# Patient Record
Sex: FEMALE | Race: WHITE | Employment: OTHER | URBAN - METROPOLITAN AREA
[De-identification: names, ages, dates, MRNs, and addresses within clinical notes are randomized per-mention and may not be internally consistent; named-entity substitution may affect disease eponyms.]

---

## 2019-11-05 ENCOUNTER — OFFICE VISIT (OUTPATIENT)
Dept: VASCULAR SURGERY | Age: 84
End: 2019-11-05
Payer: MEDICARE

## 2019-11-05 VITALS
BODY MASS INDEX: 27.48 KG/M2 | HEIGHT: 60 IN | SYSTOLIC BLOOD PRESSURE: 119 MMHG | DIASTOLIC BLOOD PRESSURE: 62 MMHG | WEIGHT: 140 LBS | HEART RATE: 61 BPM

## 2019-11-05 DIAGNOSIS — R47.01 EXPRESSIVE APHASIA: ICD-10-CM

## 2019-11-05 DIAGNOSIS — I65.23 BILATERAL CAROTID ARTERY STENOSIS: Primary | ICD-10-CM

## 2019-11-05 PROCEDURE — 99203 OFFICE O/P NEW LOW 30 MIN: CPT | Performed by: SURGERY

## 2019-11-05 PROCEDURE — G8598 ASA/ANTIPLAT THER USED: HCPCS | Performed by: SURGERY

## 2019-11-05 PROCEDURE — G8417 CALC BMI ABV UP PARAM F/U: HCPCS | Performed by: SURGERY

## 2019-11-05 PROCEDURE — 1123F ACP DISCUSS/DSCN MKR DOCD: CPT | Performed by: SURGERY

## 2019-11-05 PROCEDURE — 4040F PNEUMOC VAC/ADMIN/RCVD: CPT | Performed by: SURGERY

## 2019-11-05 PROCEDURE — G8484 FLU IMMUNIZE NO ADMIN: HCPCS | Performed by: SURGERY

## 2019-11-05 PROCEDURE — 1090F PRES/ABSN URINE INCON ASSESS: CPT | Performed by: SURGERY

## 2019-11-05 PROCEDURE — 4004F PT TOBACCO SCREEN RCVD TLK: CPT | Performed by: SURGERY

## 2019-11-05 PROCEDURE — G8427 DOCREV CUR MEDS BY ELIG CLIN: HCPCS | Performed by: SURGERY

## 2019-11-05 RX ORDER — ESCITALOPRAM OXALATE 5 MG/1
TABLET ORAL
Refills: 3 | COMMUNITY
Start: 2019-10-05

## 2021-05-30 ENCOUNTER — APPOINTMENT (OUTPATIENT)
Dept: CT IMAGING | Age: 86
End: 2021-05-30
Payer: MEDICARE

## 2021-05-30 ENCOUNTER — HOSPITAL ENCOUNTER (EMERGENCY)
Age: 86
Discharge: HOME | End: 2021-05-31
Attending: EMERGENCY MEDICINE
Payer: MEDICARE

## 2021-05-30 DIAGNOSIS — K59.00 CONSTIPATION, UNSPECIFIED CONSTIPATION TYPE: Primary | ICD-10-CM

## 2021-05-30 LAB
ALBUMIN SERPL-MCNC: 3.7 G/DL (ref 3.5–5.2)
ALP BLD-CCNC: 130 U/L (ref 35–104)
ALT SERPL-CCNC: 11 U/L (ref 0–32)
ANION GAP SERPL CALCULATED.3IONS-SCNC: 8 MMOL/L (ref 7–16)
AST SERPL-CCNC: 20 U/L (ref 0–31)
BACTERIA: NORMAL /HPF
BASOPHILS ABSOLUTE: 0.03 E9/L (ref 0–0.2)
BASOPHILS RELATIVE PERCENT: 0.3 % (ref 0–2)
BILIRUB SERPL-MCNC: 0.5 MG/DL (ref 0–1.2)
BILIRUBIN URINE: NEGATIVE
BLOOD, URINE: NEGATIVE
BUN BLDV-MCNC: 17 MG/DL (ref 6–23)
CALCIUM SERPL-MCNC: 10.6 MG/DL (ref 8.6–10.2)
CHLORIDE BLD-SCNC: 101 MMOL/L (ref 98–107)
CLARITY: CLEAR
CO2: 25 MMOL/L (ref 22–29)
COLOR: ABNORMAL
CREAT SERPL-MCNC: 0.8 MG/DL (ref 0.5–1)
EOSINOPHILS ABSOLUTE: 0.13 E9/L (ref 0.05–0.5)
EOSINOPHILS RELATIVE PERCENT: 1.3 % (ref 0–6)
GFR AFRICAN AMERICAN: >60
GFR NON-AFRICAN AMERICAN: >60 ML/MIN/1.73
GLUCOSE BLD-MCNC: 102 MG/DL (ref 74–99)
GLUCOSE URINE: NEGATIVE MG/DL
HCT VFR BLD CALC: 46.2 % (ref 34–48)
HEMOGLOBIN: 15.3 G/DL (ref 11.5–15.5)
IMMATURE GRANULOCYTES #: 0.04 E9/L
IMMATURE GRANULOCYTES %: 0.4 % (ref 0–5)
KETONES, URINE: NEGATIVE MG/DL
LACTIC ACID: 1.8 MMOL/L (ref 0.5–2.2)
LEUKOCYTE ESTERASE, URINE: ABNORMAL
LIPASE: 58 U/L (ref 13–60)
LYMPHOCYTES ABSOLUTE: 1.96 E9/L (ref 1.5–4)
LYMPHOCYTES RELATIVE PERCENT: 18.9 % (ref 20–42)
MCH RBC QN AUTO: 27.9 PG (ref 26–35)
MCHC RBC AUTO-ENTMCNC: 33.1 % (ref 32–34.5)
MCV RBC AUTO: 84.2 FL (ref 80–99.9)
MONOCYTES ABSOLUTE: 0.94 E9/L (ref 0.1–0.95)
MONOCYTES RELATIVE PERCENT: 9.1 % (ref 2–12)
NEUTROPHILS ABSOLUTE: 7.27 E9/L (ref 1.8–7.3)
NEUTROPHILS RELATIVE PERCENT: 70 % (ref 43–80)
NITRITE, URINE: NEGATIVE
PDW BLD-RTO: 14 FL (ref 11.5–15)
PH UA: 5.5 (ref 5–9)
PLATELET # BLD: 234 E9/L (ref 130–450)
PMV BLD AUTO: 10.7 FL (ref 7–12)
POTASSIUM REFLEX MAGNESIUM: 4.5 MMOL/L (ref 3.5–5)
PROTEIN UA: NEGATIVE MG/DL
RBC # BLD: 5.49 E12/L (ref 3.5–5.5)
RBC UA: NORMAL /HPF (ref 0–2)
SODIUM BLD-SCNC: 134 MMOL/L (ref 132–146)
SPECIFIC GRAVITY UA: <=1.005 (ref 1–1.03)
TOTAL PROTEIN: 6.8 G/DL (ref 6.4–8.3)
UROBILINOGEN, URINE: 0.2 E.U./DL
WBC # BLD: 10.4 E9/L (ref 4.5–11.5)
WBC UA: NORMAL /HPF (ref 0–5)

## 2021-05-30 PROCEDURE — 80053 COMPREHEN METABOLIC PANEL: CPT

## 2021-05-30 PROCEDURE — 85025 COMPLETE CBC W/AUTO DIFF WBC: CPT

## 2021-05-30 PROCEDURE — 83605 ASSAY OF LACTIC ACID: CPT

## 2021-05-30 PROCEDURE — 83690 ASSAY OF LIPASE: CPT

## 2021-05-30 PROCEDURE — 93005 ELECTROCARDIOGRAM TRACING: CPT | Performed by: EMERGENCY MEDICINE

## 2021-05-30 PROCEDURE — 99282 EMERGENCY DEPT VISIT SF MDM: CPT

## 2021-05-30 PROCEDURE — 74176 CT ABD & PELVIS W/O CONTRAST: CPT

## 2021-05-30 PROCEDURE — 81001 URINALYSIS AUTO W/SCOPE: CPT

## 2021-05-31 VITALS
HEART RATE: 87 BPM | RESPIRATION RATE: 16 BRPM | OXYGEN SATURATION: 97 % | HEIGHT: 60 IN | SYSTOLIC BLOOD PRESSURE: 150 MMHG | TEMPERATURE: 97.1 F | BODY MASS INDEX: 27.34 KG/M2 | DIASTOLIC BLOOD PRESSURE: 71 MMHG

## 2021-05-31 LAB
EKG ATRIAL RATE: 82 BPM
EKG P AXIS: 48 DEGREES
EKG P-R INTERVAL: 138 MS
EKG Q-T INTERVAL: 374 MS
EKG QRS DURATION: 78 MS
EKG QTC CALCULATION (BAZETT): 436 MS
EKG R AXIS: -24 DEGREES
EKG T AXIS: 0 DEGREES
EKG VENTRICULAR RATE: 82 BPM

## 2021-05-31 PROCEDURE — 93010 ELECTROCARDIOGRAM REPORT: CPT | Performed by: INTERNAL MEDICINE

## 2021-05-31 NOTE — ED PROVIDER NOTES
HPI:  5/30/21, Time: 8:39 PM EDT         Ortiz Sharp is a 80 y.o. female presenting to the ED for constipation. Patient has a history of dementia, so I was unable to obtain a complete history. Her daughter is at bedside helping to provide history. She states that the patient has been constipated for an unknown period of time but possibly 2 weeks. States that she has tried multiple over-the-counter medications including Dulcolax, MiraLAX, and prune juice without relief. Also states that the patient was complaining of urinary discomfort. She was recently seen in urgent care but was told that her urinalysis did not show any infection. She also started complaining of some lower abdominal discomfort today. She has had no fevers or emesis. Patient was noted to be hypertensive in the ED. Patient's daughter states that she was agitated when the blood pressure was taken, and she does take antihypertensives at home as needed. Review of Systems:   Unable to obtain complete ROS due to dementia          --------------------------------------------- PAST HISTORY ---------------------------------------------  Past Medical History:  has a past medical history of Hypertension. Past Surgical History:  has a past surgical history that includes Appendectomy; Hysterectomy; bladder repair; joint replacement; and Cataract removal.    Social History:  reports that she has never smoked. She has never used smokeless tobacco. She reports that she does not drink alcohol and does not use drugs. Family History: family history is not on file. The patients home medications have been reviewed.     Allergies: Ciprofloxacin, Lisinopril, and Statins    -------------------------------------------------- RESULTS -------------------------------------------------  All laboratory and radiology results have been personally reviewed by myself   LABS:  Results for orders placed or performed during the hospital encounter of 05/30/21 Lactic Acid, Plasma   Result Value Ref Range    Lactic Acid 1.8 0.5 - 2.2 mmol/L   CBC Auto Differential   Result Value Ref Range    WBC 10.4 4.5 - 11.5 E9/L    RBC 5.49 3.50 - 5.50 E12/L    Hemoglobin 15.3 11.5 - 15.5 g/dL    Hematocrit 46.2 34.0 - 48.0 %    MCV 84.2 80.0 - 99.9 fL    MCH 27.9 26.0 - 35.0 pg    MCHC 33.1 32.0 - 34.5 %    RDW 14.0 11.5 - 15.0 fL    Platelets 902 026 - 685 E9/L    MPV 10.7 7.0 - 12.0 fL    Neutrophils % 70.0 43.0 - 80.0 %    Immature Granulocytes % 0.4 0.0 - 5.0 %    Lymphocytes % 18.9 (L) 20.0 - 42.0 %    Monocytes % 9.1 2.0 - 12.0 %    Eosinophils % 1.3 0.0 - 6.0 %    Basophils % 0.3 0.0 - 2.0 %    Neutrophils Absolute 7.27 1.80 - 7.30 E9/L    Immature Granulocytes # 0.04 E9/L    Lymphocytes Absolute 1.96 1.50 - 4.00 E9/L    Monocytes Absolute 0.94 0.10 - 0.95 E9/L    Eosinophils Absolute 0.13 0.05 - 0.50 E9/L    Basophils Absolute 0.03 0.00 - 0.20 E9/L   Comprehensive Metabolic Panel w/ Reflex to MG   Result Value Ref Range    Sodium 134 132 - 146 mmol/L    Potassium reflex Magnesium 4.5 3.5 - 5.0 mmol/L    Chloride 101 98 - 107 mmol/L    CO2 25 22 - 29 mmol/L    Anion Gap 8 7 - 16 mmol/L    Glucose 102 (H) 74 - 99 mg/dL    BUN 17 6 - 23 mg/dL    CREATININE 0.8 0.5 - 1.0 mg/dL    GFR Non-African American >60 >=60 mL/min/1.73    GFR African American >60     Calcium 10.6 (H) 8.6 - 10.2 mg/dL    Total Protein 6.8 6.4 - 8.3 g/dL    Albumin 3.7 3.5 - 5.2 g/dL    Total Bilirubin 0.5 0.0 - 1.2 mg/dL    Alkaline Phosphatase 130 (H) 35 - 104 U/L    ALT 11 0 - 32 U/L    AST 20 0 - 31 U/L   Lipase   Result Value Ref Range    Lipase 58 13 - 60 U/L   Urinalysis, reflex to microscopic   Result Value Ref Range    Color, UA Straw Straw/Yellow    Clarity, UA Clear Clear    Glucose, Ur Negative Negative mg/dL    Bilirubin Urine Negative Negative    Ketones, Urine Negative Negative mg/dL    Specific Gravity, UA <=1.005 1.005 - 1.030    Blood, Urine Negative Negative    pH, UA 5.5 5.0 - 9.0 Protein, UA Negative Negative mg/dL    Urobilinogen, Urine 0.2 <2.0 E.U./dL    Nitrite, Urine Negative Negative    Leukocyte Esterase, Urine TRACE (A) Negative   Microscopic Urinalysis   Result Value Ref Range    WBC, UA 0-1 0 - 5 /HPF    RBC, UA NONE 0 - 2 /HPF    Bacteria, UA NONE SEEN None Seen /HPF   EKG 12 Lead   Result Value Ref Range    Ventricular Rate 82 BPM    Atrial Rate 82 BPM    P-R Interval 138 ms    QRS Duration 78 ms    Q-T Interval 374 ms    QTc Calculation (Bazett) 436 ms    P Axis 48 degrees    R Axis -24 degrees    T Axis 0 degrees       RADIOLOGY:  Interpreted by Radiologist.  CT ABDOMEN PELVIS WO CONTRAST Additional Contrast? None   Final Result   No bowel obstruction or free intraperitoneal air. Cholelithiasis. Moderate-sized hiatal hernia. Sigmoid diverticulosis with no evidence of diverticulitis. Other chronic and incidental findings as noted. ------------------------- NURSING NOTES AND VITALS REVIEWED ---------------------------   The nursing notes within the ED encounter and vital signs as below have been reviewed. BP (!) 160/66   Pulse 88   Temp 97.6 °F (36.4 °C) (Temporal)   Resp 20   Ht 5' (1.524 m)   SpO2 97%   BMI 27.34 kg/m²   Oxygen Saturation Interpretation: Normal      ---------------------------------------------------PHYSICAL EXAM--------------------------------------      Constitutional/General: Alert, non toxic in NAD  Head: Normocephalic and atraumatic  Mouth: Oropharynx clear, handling secretions, no trismus  Neck: Supple, full ROM,   Pulmonary: Lungs clear to auscultation bilaterally, no wheezes, rales, or rhonchi. Not in respiratory distress  Cardiovascular:  Regular rate and rhythm, no murmurs, gallops, or rubs. 2+ distal pulses  Abdomen: Soft, non distended, mild lower abdominal tenderness, no rebound, no guarding. Extremities: Moves all extremities x 4.  Warm and well perfused  Skin: warm and dry without rash  Neurologic: Awake and alert, at her neurologic baseline, no focal motor or sensory deficits   Psych: Normal Affect. Behavior normal.      ------------------------------ ED COURSE/MEDICAL DECISION MAKING----------------------  Medications   magnesium citrate solution 296 mL (has no administration in time range)       Medical Decision Making/ED COURSE:   Patient is a 70-year-old female presenting with constipation and abdominal pain. Patient was nontoxic on examination. Labs and abdominal CT obtained. I reviewed and interpreted labs. Labs within normal limits. UA clean. No acute findings or evidence of bowel obstruction on abdominal CT. I did discuss incidental findings with the patient's daughter at bedside. Plan to give enema and discharged home with magnesium citrate. Supportive care measures and ED return precautions discussed. Patient remained hemodynamically stable throughout ED course. ED Course as of May 30 2356   Sun May 30, 2021   2037 EKG: This EKG is signed and interpreted by me. Rate: 82  Rhythm: Sinus  Interpretation: Sinus rhythm, normal FL interval, normal QRS, normal QT interval, no acute ST or T wave changes  Comparison: no previous EKG available        [JA]      ED Course User Index  [JA] Dhaval Lara MD       Counseling: The emergency provider has spoken with the patient and daughter and discussed todays results, in addition to providing specific details for the plan of care and counseling regarding the diagnosis and prognosis. Questions are answered at this time and they are agreeable with the plan.      --------------------------------- IMPRESSION AND DISPOSITION ---------------------------------    IMPRESSION  1. Constipation, unspecified constipation type        DISPOSITION  Disposition: Discharge to home  Patient condition is stable      NOTE: This report was transcribed using voice recognition software.  Every effort was made to ensure accuracy; however, inadvertent

## 2021-06-29 ENCOUNTER — HOSPITAL ENCOUNTER (INPATIENT)
Age: 86
LOS: 4 days | Discharge: ANOTHER HOSPITAL | DRG: 061 | End: 2021-07-03
Attending: EMERGENCY MEDICINE | Admitting: INTERNAL MEDICINE
Payer: MEDICARE

## 2021-06-29 ENCOUNTER — APPOINTMENT (OUTPATIENT)
Dept: GENERAL RADIOLOGY | Age: 86
DRG: 061 | End: 2021-06-29
Payer: MEDICARE

## 2021-06-29 ENCOUNTER — APPOINTMENT (OUTPATIENT)
Dept: CT IMAGING | Age: 86
DRG: 061 | End: 2021-06-29
Payer: MEDICARE

## 2021-06-29 DIAGNOSIS — I63.9 CEREBROVASCULAR ACCIDENT (CVA), UNSPECIFIED MECHANISM (HCC): Primary | ICD-10-CM

## 2021-06-29 DIAGNOSIS — I63.9 STROKE ABORTED BY ADMINISTRATION OF THROMBOLYTIC AGENT (HCC): ICD-10-CM

## 2021-06-29 LAB
ANION GAP SERPL CALCULATED.3IONS-SCNC: 10 MMOL/L (ref 7–16)
APTT: 30.1 SEC (ref 24.5–35.1)
BASOPHILS ABSOLUTE: 0.02 E9/L (ref 0–0.2)
BASOPHILS RELATIVE PERCENT: 0.2 % (ref 0–2)
BUN BLDV-MCNC: 17 MG/DL (ref 6–23)
CALCIUM SERPL-MCNC: 9.9 MG/DL (ref 8.6–10.2)
CHLORIDE BLD-SCNC: 102 MMOL/L (ref 98–107)
CO2: 23 MMOL/L (ref 22–29)
CREAT SERPL-MCNC: 0.8 MG/DL (ref 0.5–1)
D DIMER: 321 NG/ML DDU
EOSINOPHILS ABSOLUTE: 0.32 E9/L (ref 0.05–0.5)
EOSINOPHILS RELATIVE PERCENT: 3.5 % (ref 0–6)
GFR AFRICAN AMERICAN: >60
GFR NON-AFRICAN AMERICAN: >60 ML/MIN/1.73
GLUCOSE BLD-MCNC: 106 MG/DL (ref 74–99)
HCT VFR BLD CALC: 52 % (ref 34–48)
HEMOGLOBIN: 16.8 G/DL (ref 11.5–15.5)
IMMATURE GRANULOCYTES #: 0.04 E9/L
IMMATURE GRANULOCYTES %: 0.4 % (ref 0–5)
INR BLD: 1.1
LYMPHOCYTES ABSOLUTE: 3.02 E9/L (ref 1.5–4)
LYMPHOCYTES RELATIVE PERCENT: 32.6 % (ref 20–42)
MAGNESIUM: 2.4 MG/DL (ref 1.6–2.6)
MCH RBC QN AUTO: 27.1 PG (ref 26–35)
MCHC RBC AUTO-ENTMCNC: 32.3 % (ref 32–34.5)
MCV RBC AUTO: 84 FL (ref 80–99.9)
MONOCYTES ABSOLUTE: 0.98 E9/L (ref 0.1–0.95)
MONOCYTES RELATIVE PERCENT: 10.6 % (ref 2–12)
NEUTROPHILS ABSOLUTE: 4.88 E9/L (ref 1.8–7.3)
NEUTROPHILS RELATIVE PERCENT: 52.7 % (ref 43–80)
PDW BLD-RTO: 14.2 FL (ref 11.5–15)
PLATELET # BLD: 315 E9/L (ref 130–450)
PMV BLD AUTO: 10.9 FL (ref 7–12)
POTASSIUM REFLEX MAGNESIUM: 3.3 MMOL/L (ref 3.5–5)
PROTHROMBIN TIME: 11.8 SEC (ref 9.3–12.4)
RBC # BLD: 6.19 E12/L (ref 3.5–5.5)
SARS-COV-2, NAAT: NOT DETECTED
SODIUM BLD-SCNC: 135 MMOL/L (ref 132–146)
TROPONIN, HIGH SENSITIVITY: 26 NG/L (ref 0–9)
WBC # BLD: 9.3 E9/L (ref 4.5–11.5)

## 2021-06-29 PROCEDURE — 2580000003 HC RX 258

## 2021-06-29 PROCEDURE — 84484 ASSAY OF TROPONIN QUANT: CPT

## 2021-06-29 PROCEDURE — 6360000002 HC RX W HCPCS

## 2021-06-29 PROCEDURE — 70498 CT ANGIOGRAPHY NECK: CPT | Performed by: RADIOLOGY

## 2021-06-29 PROCEDURE — 71045 X-RAY EXAM CHEST 1 VIEW: CPT

## 2021-06-29 PROCEDURE — 85730 THROMBOPLASTIN TIME PARTIAL: CPT

## 2021-06-29 PROCEDURE — 2580000003 HC RX 258: Performed by: INTERNAL MEDICINE

## 2021-06-29 PROCEDURE — 0042T CT BRAIN PERFUSION: CPT | Performed by: RADIOLOGY

## 2021-06-29 PROCEDURE — 99282 EMERGENCY DEPT VISIT SF MDM: CPT

## 2021-06-29 PROCEDURE — 99291 CRITICAL CARE FIRST HOUR: CPT | Performed by: PSYCHIATRY & NEUROLOGY

## 2021-06-29 PROCEDURE — 2000000000 HC ICU R&B

## 2021-06-29 PROCEDURE — 83735 ASSAY OF MAGNESIUM: CPT

## 2021-06-29 PROCEDURE — 3E03317 INTRODUCTION OF OTHER THROMBOLYTIC INTO PERIPHERAL VEIN, PERCUTANEOUS APPROACH: ICD-10-PCS | Performed by: INTERNAL MEDICINE

## 2021-06-29 PROCEDURE — 85025 COMPLETE CBC W/AUTO DIFF WBC: CPT

## 2021-06-29 PROCEDURE — 4A03X5D MEASUREMENT OF ARTERIAL FLOW, INTRACRANIAL, EXTERNAL APPROACH: ICD-10-PCS | Performed by: RADIOLOGY

## 2021-06-29 PROCEDURE — 80048 BASIC METABOLIC PNL TOTAL CA: CPT

## 2021-06-29 PROCEDURE — 2500000003 HC RX 250 WO HCPCS

## 2021-06-29 PROCEDURE — 2500000003 HC RX 250 WO HCPCS: Performed by: INTERNAL MEDICINE

## 2021-06-29 PROCEDURE — 70498 CT ANGIOGRAPHY NECK: CPT

## 2021-06-29 PROCEDURE — 6360000004 HC RX CONTRAST MEDICATION: Performed by: RADIOLOGY

## 2021-06-29 PROCEDURE — 85378 FIBRIN DEGRADE SEMIQUANT: CPT

## 2021-06-29 PROCEDURE — 70496 CT ANGIOGRAPHY HEAD: CPT | Performed by: RADIOLOGY

## 2021-06-29 PROCEDURE — 36415 COLL VENOUS BLD VENIPUNCTURE: CPT

## 2021-06-29 PROCEDURE — 70450 CT HEAD/BRAIN W/O DYE: CPT

## 2021-06-29 PROCEDURE — 87635 SARS-COV-2 COVID-19 AMP PRB: CPT

## 2021-06-29 PROCEDURE — 70496 CT ANGIOGRAPHY HEAD: CPT

## 2021-06-29 PROCEDURE — 0042T CT BRAIN PERFUSION: CPT

## 2021-06-29 PROCEDURE — 6360000002 HC RX W HCPCS: Performed by: EMERGENCY MEDICINE

## 2021-06-29 PROCEDURE — 85610 PROTHROMBIN TIME: CPT

## 2021-06-29 RX ORDER — SODIUM CHLORIDE 9 MG/ML
25 INJECTION, SOLUTION INTRAVENOUS PRN
Status: DISCONTINUED | OUTPATIENT
Start: 2021-06-29 | End: 2021-07-03 | Stop reason: HOSPADM

## 2021-06-29 RX ORDER — SODIUM CHLORIDE 0.9 % (FLUSH) 0.9 %
5-40 SYRINGE (ML) INJECTION EVERY 12 HOURS SCHEDULED
Status: DISCONTINUED | OUTPATIENT
Start: 2021-06-29 | End: 2021-07-03 | Stop reason: HOSPADM

## 2021-06-29 RX ORDER — KETAMINE HYDROCHLORIDE 100 MG/ML
INJECTION, SOLUTION INTRAMUSCULAR; INTRAVENOUS
Status: DISPENSED
Start: 2021-06-29 | End: 2021-06-30

## 2021-06-29 RX ORDER — CLOPIDOGREL BISULFATE 75 MG/1
75 TABLET ORAL EVERY OTHER DAY
Status: ON HOLD | COMMUNITY
End: 2021-07-03 | Stop reason: HOSPADM

## 2021-06-29 RX ORDER — SODIUM CHLORIDE 0.9 % (FLUSH) 0.9 %
5-40 SYRINGE (ML) INJECTION PRN
Status: DISCONTINUED | OUTPATIENT
Start: 2021-06-29 | End: 2021-06-29 | Stop reason: SDUPTHER

## 2021-06-29 RX ORDER — ASPIRIN 300 MG/1
300 SUPPOSITORY RECTAL DAILY
Status: DISCONTINUED | OUTPATIENT
Start: 2021-06-30 | End: 2021-06-30

## 2021-06-29 RX ORDER — 0.9 % SODIUM CHLORIDE 0.9 %
1000 INTRAVENOUS SOLUTION INTRAVENOUS ONCE
Status: DISCONTINUED | OUTPATIENT
Start: 2021-06-29 | End: 2021-07-03 | Stop reason: HOSPADM

## 2021-06-29 RX ORDER — SODIUM CHLORIDE 0.9 % (FLUSH) 0.9 %
5-40 SYRINGE (ML) INJECTION PRN
Status: DISCONTINUED | OUTPATIENT
Start: 2021-06-29 | End: 2021-07-03 | Stop reason: HOSPADM

## 2021-06-29 RX ORDER — DEXTROSE MONOHYDRATE 25 G/50ML
12.5 INJECTION, SOLUTION INTRAVENOUS
Status: ACTIVE | OUTPATIENT
Start: 2021-06-29 | End: 2021-06-29

## 2021-06-29 RX ORDER — POLYETHYLENE GLYCOL 3350 17 G/17G
17 POWDER, FOR SOLUTION ORAL DAILY PRN
Status: DISCONTINUED | OUTPATIENT
Start: 2021-06-29 | End: 2021-07-03 | Stop reason: HOSPADM

## 2021-06-29 RX ORDER — SODIUM CHLORIDE 0.9 % (FLUSH) 0.9 %
5-40 SYRINGE (ML) INJECTION EVERY 12 HOURS SCHEDULED
Status: DISCONTINUED | OUTPATIENT
Start: 2021-06-29 | End: 2021-06-29 | Stop reason: SDUPTHER

## 2021-06-29 RX ORDER — ONDANSETRON 2 MG/ML
4 INJECTION INTRAMUSCULAR; INTRAVENOUS EVERY 6 HOURS PRN
Status: DISCONTINUED | OUTPATIENT
Start: 2021-06-29 | End: 2021-07-03 | Stop reason: HOSPADM

## 2021-06-29 RX ORDER — SODIUM CHLORIDE 9 MG/ML
25 INJECTION, SOLUTION INTRAVENOUS PRN
Status: DISCONTINUED | OUTPATIENT
Start: 2021-06-29 | End: 2021-06-29 | Stop reason: SDUPTHER

## 2021-06-29 RX ORDER — AMLODIPINE BESYLATE 5 MG/1
5 TABLET ORAL DAILY
Status: DISCONTINUED | OUTPATIENT
Start: 2021-06-30 | End: 2021-07-03 | Stop reason: HOSPADM

## 2021-06-29 RX ORDER — 0.9 % SODIUM CHLORIDE 0.9 %
50 INTRAVENOUS SOLUTION INTRAVENOUS ONCE
Status: DISCONTINUED | OUTPATIENT
Start: 2021-06-29 | End: 2021-07-03 | Stop reason: HOSPADM

## 2021-06-29 RX ORDER — ESCITALOPRAM OXALATE 10 MG/1
5 TABLET ORAL DAILY
Status: DISCONTINUED | OUTPATIENT
Start: 2021-06-30 | End: 2021-06-30

## 2021-06-29 RX ORDER — LORAZEPAM 2 MG/ML
1 INJECTION INTRAMUSCULAR ONCE
Status: COMPLETED | OUTPATIENT
Start: 2021-06-29 | End: 2021-06-29

## 2021-06-29 RX ORDER — KETAMINE HYDROCHLORIDE 10 MG/ML
INJECTION, SOLUTION INTRAMUSCULAR; INTRAVENOUS
Status: COMPLETED
Start: 2021-06-29 | End: 2021-06-29

## 2021-06-29 RX ORDER — ASPIRIN 81 MG/1
81 TABLET ORAL DAILY
Status: DISCONTINUED | OUTPATIENT
Start: 2021-06-30 | End: 2021-06-30

## 2021-06-29 RX ORDER — SODIUM CHLORIDE 0.9 % (FLUSH) 0.9 %
10 SYRINGE (ML) INJECTION ONCE
Status: DISCONTINUED | OUTPATIENT
Start: 2021-06-29 | End: 2021-07-03 | Stop reason: HOSPADM

## 2021-06-29 RX ORDER — ONDANSETRON 4 MG/1
4 TABLET, ORALLY DISINTEGRATING ORAL EVERY 8 HOURS PRN
Status: DISCONTINUED | OUTPATIENT
Start: 2021-06-29 | End: 2021-07-03 | Stop reason: HOSPADM

## 2021-06-29 RX ORDER — KETAMINE HYDROCHLORIDE 10 MG/ML
INJECTION, SOLUTION INTRAMUSCULAR; INTRAVENOUS
Status: DISPENSED
Start: 2021-06-29 | End: 2021-06-30

## 2021-06-29 RX ADMIN — DEXMEDETOMIDINE HYDROCHLORIDE 0.2 MCG/KG/HR: 100 INJECTION, SOLUTION INTRAVENOUS at 22:47

## 2021-06-29 RX ADMIN — LORAZEPAM 1 MG: 2 INJECTION INTRAMUSCULAR; INTRAVENOUS at 20:21

## 2021-06-29 RX ADMIN — IOPAMIDOL 100 ML: 755 INJECTION, SOLUTION INTRAVENOUS at 18:32

## 2021-06-29 RX ADMIN — KETAMINE HYDROCHLORIDE 20 MG: 10 INJECTION, SOLUTION INTRAMUSCULAR; INTRAVENOUS at 20:37

## 2021-06-29 RX ADMIN — SODIUM CHLORIDE, PRESERVATIVE FREE 10 ML: 5 INJECTION INTRAVENOUS at 22:57

## 2021-06-29 NOTE — ED NOTES
Time Neurologist page:180    Time Stroke Alert called:  :1692    Time Neurologist called back:1822    X-Ray/CT notified:Michael Christina  06/29/21 1825

## 2021-06-29 NOTE — LETTER
City: [address] [place] [address]         Marital Status:    Employer: RETIRED         Religious: Christianity   Primary Care Provider: Myra Torres MD         Primary Phone: [phone]   EMERGENCY CONTACT   Contact Name Legal Guardian? Relationship to Patient Home Phone Work Phone   1. RayTeresa  2. RayFelipe No  No Child  Other [phone]              GUARANTOR            Guarantor: Kalpesh Ambrosemagdins     : 1926   Address: [address] [place] [address] Sex: Female   [place] [address] [facility]     Relation to Patient: Self       Home Phone: [phone]   Guarantor ID: [id number]       Work Phone:     Guarantor Employer: RETIRED         Status: RETIRED      COVERAGE        PRIMARY INSURANCE   Payor: MEDICARE Plan: MEDICARE PART A AND B   Payor Address: [place] [address],  [address]       Group Number:   Insurance Type: INDEMNITY   Subscriber Name: Dorothea Ayala : 1926   Subscriber ID: [id number] Hamzah Smith. Rel. to Sub: Self   SECONDARY INSURANCE   Payor: [employer] Plan: [place] Address:  [facility] [id number], [place], [place] [address]          Group Number:   Insurance Type: INDEMNITY   Subscriber Name: Dorothea Ayala : 1926   Subscriber ID: [id number] Pat.  Rel. to Sub: SELF

## 2021-06-29 NOTE — ED PROVIDER NOTES
[address]      Pt Name: Sariah Nick  MRN: [id number]  [place] 11/27/1926  Date of evaluation: 6/29/2021      CHIEF COMPLAINT     No chief complaint on file. HPI  Sariah Nick is a 80 y.o. female who presents to the emergency department with altered mental status. Per family patient was in the car eating ice cream when she suddenly became altered. They noted that she was having trouble eating the ice cream.  She became agitated. They brought her to the ED by personal vehicle for further evaluation. Family denies any injury or trauma. History limited secondary to patient's altered mental status. Except as noted above the remainder of the review of systems was reviewed and negative. Review of Systems   Unable to perform ROS: Mental status change        Physical Exam  Vitals and nursing note reviewed. Constitutional:       General: She is in acute distress. Appearance: She is well-developed. Comments: Awake. Not cooperative, attempting to take off clothing and pull off iv. HENT:      Head: Normocephalic and atraumatic. Right Ear: External ear normal.      Left Ear: External ear normal.      Mouth/Throat:      Mouth: Mucous membranes are moist.   Eyes:      General: No scleral icterus. Pupils: Pupils are equal, round, and reactive to light. Cardiovascular:      Rate and Rhythm: Regular rhythm. Tachycardia present. Heart sounds: No murmur heard. Comments: 2+ radial and dorsal pedis pulses bilaterally  Pulmonary:      Effort: Pulmonary effort is normal. No respiratory distress. Breath sounds: Normal breath sounds. No wheezing. Abdominal:      Palpations: Abdomen is soft. Tenderness: There is no abdominal tenderness. There is no guarding or rebound. Musculoskeletal:         General: No tenderness or deformity. Normal range of motion. Cervical back: Normal range of motion and neck supple.       Right lower leg: No edema. Left lower leg: No edema. Skin:     General: Skin is warm and dry. Capillary Refill: Capillary refill takes less than 2 seconds. Neurological:      Cranial Nerves: No cranial nerve deficit. Motor: No weakness or abnormal muscle tone. Comments: Normal strength of the upper and lower extremities bilaterally. Pupils equal round reactive to light. EOMI. Patient not cooperative. Occasionally will say 1 word such as \"why \". Not answering questions. Agitated. Psychiatric:      Comments: Not answering questions. Agitated. Uncooperative. Procedures     MDM   This is a 66-year-old female with history of hypertension who presents to the emergency department with acute altered mental status. Patient agitated, uncooperative. Patient seen by family acutely altered but 20 minutes prior to arrival.  Administered Ativan as well as ketamine due to severe agitation patient not cooperating try to take out IV and jump out of bed and take off clothes. Stroke alert called. Brain perfusion showed small right frontal ischemia discussed with Dr. Valarie Chan who was there is deficit in 3. Patient not a candidate for surgical intervention. Discussed with telemetry stroke who recommended TPA. After discussion with family about risks and benefits they agreed to have TPA. TPA administered. Patient had no acute changes and remained stable in the ED. Metabolic panel showed normal electrolytes, normal renal function. Patient admitted to medical ICU as no beds in the neuro ICU status post TPA administration. ED Course as of Jun 30 0007 Tue Jun 29, 2021   0452 Discussed with Dr. Valarie Chan, he reports that patient has a superior division infarction of M3 which correlates with her symptoms. No occlusion or infarct that can be amendable to surgical intervention.      [LM]      ED Course User Index  [LM] Brunilda Brothers DO        ED Course as of Jun 30 0007 Tue Jun 29, 2021   3843 Discussed with  Maddi Gonzalez, he reports that patient has a superior division infarction of M3 which correlates with her symptoms. No occlusion or infarct that can be amendable to surgical intervention. [LM]      ED Course User Index  [LM] Arnel Burden, DO       --------------------------------------------- PAST HISTORY ---------------------------------------------  Past Medical History:  has a past medical history of Hypertension. Past Surgical History:  has a past surgical history that includes Appendectomy; Hysterectomy; bladder repair; joint replacement; and Cataract removal.    Social History:  reports that she has never smoked. She has never used smokeless tobacco. She reports that she does not drink alcohol and does not use drugs. Family History: family history is not on file. The patients home medications have been reviewed.     Allergies: Ciprofloxacin, Lisinopril, and Statins    -------------------------------------------------- RESULTS -------------------------------------------------    LABS:  Results for orders placed or performed during the hospital encounter of 06/29/21   COVID-19, Rapid    Specimen: Nasopharyngeal Swab   Result Value Ref Range    SARS-CoV-2, NAAT Not Detected Not Detected   CBC Auto Differential   Result Value Ref Range    WBC 9.3 4.5 - 11.5 E9/L    RBC 6.19 (H) 3.50 - 5.50 E12/L    Hemoglobin 16.8 (H) 11.5 - 15.5 g/dL    Hematocrit 52.0 (H) 34.0 - 48.0 %    MCV 84.0 80.0 - 99.9 fL    MCH 27.1 26.0 - 35.0 pg    MCHC 32.3 32.0 - 34.5 %    RDW 14.2 11.5 - 15.0 fL    Platelets 869 426 - 327 E9/L    MPV 10.9 7.0 - 12.0 fL    Neutrophils % 52.7 43.0 - 80.0 %    Immature Granulocytes % 0.4 0.0 - 5.0 %    Lymphocytes % 32.6 20.0 - 42.0 %    Monocytes % 10.6 2.0 - 12.0 %    Eosinophils % 3.5 0.0 - 6.0 %    Basophils % 0.2 0.0 - 2.0 %    Neutrophils Absolute 4.88 1.80 - 7.30 E9/L    Immature Granulocytes # 0.04 E9/L    Lymphocytes Absolute 3.02 1.50 - 4.00 E9/L    Monocytes Absolute 0.98 (H) 0.10 - 0.95 E9/L    Eosinophils Absolute 0.32 0.05 - 0.50 E9/L    Basophils Absolute 0.02 0.00 - 0.20 K5/W   Basic Metabolic Panel w/ Reflex to MG   Result Value Ref Range    Sodium 135 132 - 146 mmol/L    Potassium reflex Magnesium 3.3 (L) 3.5 - 5.0 mmol/L    Chloride 102 98 - 107 mmol/L    CO2 23 22 - 29 mmol/L    Anion Gap 10 7 - 16 mmol/L    Glucose 106 (H) 74 - 99 mg/dL    BUN 17 6 - 23 mg/dL    CREATININE 0.8 0.5 - 1.0 mg/dL    GFR Non-African American >60 >=60 mL/min/1.73    GFR African American >60     Calcium 9.9 8.6 - 10.2 mg/dL   Troponin   Result Value Ref Range    Troponin, High Sensitivity 26 (H) 0 - 9 ng/L   Protime-INR   Result Value Ref Range    Protime 11.8 9.3 - 12.4 sec    INR 1.1    APTT   Result Value Ref Range    aPTT 30.1 24.5 - 35.1 sec   D-Dimer, Quantitative   Result Value Ref Range    D-Dimer, Quant 321 ng/mL DDU   Magnesium   Result Value Ref Range    Magnesium 2.4 1.6 - 2.6 mg/dL       RADIOLOGY:  CTA HEAD W CONTRAST   Final Result   1. Estimated stenosis of the proximal right and left internal carotid   artery by NASCET criteria is not hemodynamically significant   2. Moderate atherosclerotic disease . 3. Right M3 occlusion            This study was analyzed by the [address] ai algorithm. CTA NECK W CONTRAST   Final Result   1. Estimated stenosis of the proximal right and left internal carotid   artery by NASCET criteria is not hemodynamically significant   2. Moderate atherosclerotic disease . 3. Right M3 occlusion            This study was analyzed by the [address] ai algorithm. CT BRAIN PERFUSION   Final Result      There is a small region of right frontal ischemia      This study was analyzed by the [employer] ai algorithm. CT Head WO Contrast   Final Result   Addendum 1 of 1   ADDENDUM:   Critical results were called by Dr. Romi Arrington to Dr. Josr Gerard on   6/29/2021 at 6:54 p.m. Clifford Ramirez Final   No acute intracranial abnormality.             XR CHEST PORTABLE Spoke with Dr. Trinity Whiting, Discussed case. They will admit the patient. This patient's ED course included: a personal history and physicial examination, re-evaluation prior to disposition, multiple bedside re-evaluations, IV medications, cardiac monitoring and continuous pulse oximetry    This patient has remained hemodynamically stable during their ED course. Diagnosis:  1. Cerebrovascular accident (CVA), unspecified mechanism ([facility] [address]    2. Stroke aborted by administration of thrombolytic agent ([facility] [address]        Disposition:  Patient's disposition: Admit to CCU/ICU  Patient's condition is fair.          Marcos Hoffman DO  Resident  06/30/21 8704

## 2021-06-29 NOTE — VIRTUAL HEALTH
Consults  Patient Location:  [address]  [address] [facility] Emergency Department    Provider Location ([facility]/State):     [address] [place] [address]    This virtual visit was conducted via interactive/real-time audio/video. [facility] Stroke and Vascular Neurology Consult for  [address] Stroke Alert through [address] @ 6:18pm  6/29/2021 6:20 PM  Pt Name: Eliza Dill  MRN: [id number]  [place]: 11/27/1926  Date of evaluation: 6/29/2021  Primary Care Physician: Quentin Montana MD  Reason for Evaluation: Stroke Evaluation with Discussion with Ed or primary team with Telemedicine and stroke evaluation with Review of imaging and labs    Eliza Dill is a 80 y.o. 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 Of note, pt family noted that her memory is declining in the last 2 years, worsen in the last 6 months, about two weeks ago pt was at the son's house and just wanted to take all her cloth off. LKW:  5:30pm  NIH:  Not examable, because pt already received ativan 2mg and ketamine 20mg IV, pt is sleeping and difficult to arouse. Allergies  is allergic to ciprofloxacin, lisinopril, and statins. Medications  Prior to Admission medications    Medication Sig Start Date End Date Taking?  Authorizing Provider   escitalopram (LEXAPRO) 5 MG tablet TAKE ONE TABLET BY MOUTH ONCE A DAY 10/5/19   Historical Provider, MD   amLODIPine (NORVASC) 5 MG tablet Take 5 mg by mouth daily    Historical Provider, MD   vitamin D (CHOLECALCIFEROL) 1000 UNIT TABS tablet Take 1,000 Units by mouth daily    Historical Provider, MD   aspirin 81 MG tablet Take 81 mg by mouth daily    Historical Provider, MD    Scheduled Meds:   sodium chloride  1,000 mL Intravenous Once    ketamine        sodium chloride flush  10 mL Intravenous Once     Continuous Infusions:  PRN Meds:. iopamidol  Past Medical History   has a past medical history of Hypertension. Social History  Social History     Socioeconomic History    Marital status:      Spouse name: Not on file    Number of children: Not on file    Years of education: Not on file    Highest education level: Not on file   Occupational History    Not on file   Tobacco Use    Smoking status: Never Smoker    Smokeless tobacco: Never Used   Substance and Sexual Activity    Alcohol use: No    Drug use: No    Sexual activity: Not Currently   Other Topics Concern    Not on file   Social History Narrative    Not on file     Social Determinants of Health     Financial Resource Strain:     Difficulty of Paying Living Expenses:    Food Insecurity:     Worried About Running Out of Food in the Last Year:     [address] in the Last Year:    Transportation Needs:     Lack of Transportation (Medical):  Lack of Transportation (Non-Medical):    Physical Activity:     Days of Exercise per Week:     Minutes of Exercise per Session:    Stress:     Feeling of Stress :    Social Connections:     Frequency of Communication with Friends and Family:     Frequency of Social Gatherings with Friends and Family:     Attends Hindu Services:     Active Member of Clubs or Organizations:     Attends Club or Organization Meetings:     Marital Status:    Intimate Partner Violence:     Fear of Current or Ex-Partner:     Emotionally Abused:     Physically Abused:     Sexually Abused:      Family History  No family history on file. OBJECTIVE  There were no vitals taken for this visit.        Pre-Morbid mRS: 0    Imaging:  Images were personally [phone]  Electronically signed 6/29/2021 at 6:20 PM

## 2021-06-29 NOTE — ED NOTES
Bed: 22  Expected date:   Expected time:   Means of arrival:   Comments:  Doretha Palmer, PAULINO  06/29/21 0055

## 2021-06-30 ENCOUNTER — APPOINTMENT (OUTPATIENT)
Dept: MRI IMAGING | Age: 86
DRG: 061 | End: 2021-06-30
Payer: MEDICARE

## 2021-06-30 ENCOUNTER — APPOINTMENT (OUTPATIENT)
Dept: GENERAL RADIOLOGY | Age: 86
DRG: 061 | End: 2021-06-30
Payer: MEDICARE

## 2021-06-30 LAB
ANION GAP SERPL CALCULATED.3IONS-SCNC: 8 MMOL/L (ref 7–16)
BASOPHILS ABSOLUTE: 0.02 E9/L (ref 0–0.2)
BASOPHILS RELATIVE PERCENT: 0.2 % (ref 0–2)
BUN BLDV-MCNC: 15 MG/DL (ref 6–23)
CALCIUM SERPL-MCNC: 9.8 MG/DL (ref 8.6–10.2)
CHLORIDE BLD-SCNC: 105 MMOL/L (ref 98–107)
CHOLESTEROL, TOTAL: 127 MG/DL (ref 0–199)
CO2: 26 MMOL/L (ref 22–29)
CREAT SERPL-MCNC: 0.8 MG/DL (ref 0.5–1)
EOSINOPHILS ABSOLUTE: 0.14 E9/L (ref 0.05–0.5)
EOSINOPHILS RELATIVE PERCENT: 1.5 % (ref 0–6)
GFR AFRICAN AMERICAN: >60
GFR NON-AFRICAN AMERICAN: >60 ML/MIN/1.73
GLUCOSE BLD-MCNC: 143 MG/DL (ref 74–99)
HBA1C MFR BLD: 5.1 % (ref 4–5.6)
HCT VFR BLD CALC: 51.1 % (ref 34–48)
HDLC SERPL-MCNC: 29 MG/DL
HEMOGLOBIN: 16.1 G/DL (ref 11.5–15.5)
IMMATURE GRANULOCYTES #: 0.03 E9/L
IMMATURE GRANULOCYTES %: 0.3 % (ref 0–5)
LDL CHOLESTEROL CALCULATED: 73 MG/DL (ref 0–99)
LYMPHOCYTES ABSOLUTE: 1.65 E9/L (ref 1.5–4)
LYMPHOCYTES RELATIVE PERCENT: 17.5 % (ref 20–42)
MAGNESIUM: 2.4 MG/DL (ref 1.6–2.6)
MCH RBC QN AUTO: 26.9 PG (ref 26–35)
MCHC RBC AUTO-ENTMCNC: 31.5 % (ref 32–34.5)
MCV RBC AUTO: 85.3 FL (ref 80–99.9)
MONOCYTES ABSOLUTE: 0.92 E9/L (ref 0.1–0.95)
MONOCYTES RELATIVE PERCENT: 9.8 % (ref 2–12)
NEUTROPHILS ABSOLUTE: 6.66 E9/L (ref 1.8–7.3)
NEUTROPHILS RELATIVE PERCENT: 70.7 % (ref 43–80)
PDW BLD-RTO: 13.8 FL (ref 11.5–15)
PHOSPHORUS: 3.7 MG/DL (ref 2.5–4.5)
PLATELET # BLD: 242 E9/L (ref 130–450)
PMV BLD AUTO: 10.8 FL (ref 7–12)
POTASSIUM SERPL-SCNC: 3.7 MMOL/L (ref 3.5–5)
RBC # BLD: 5.99 E12/L (ref 3.5–5.5)
SODIUM BLD-SCNC: 139 MMOL/L (ref 132–146)
TRIGL SERPL-MCNC: 127 MG/DL (ref 0–149)
VLDLC SERPL CALC-MCNC: 25 MG/DL
WBC # BLD: 9.4 E9/L (ref 4.5–11.5)

## 2021-06-30 PROCEDURE — 83036 HEMOGLOBIN GLYCOSYLATED A1C: CPT

## 2021-06-30 PROCEDURE — 87081 CULTURE SCREEN ONLY: CPT

## 2021-06-30 PROCEDURE — 80048 BASIC METABOLIC PNL TOTAL CA: CPT

## 2021-06-30 PROCEDURE — 2580000003 HC RX 258: Performed by: NURSE PRACTITIONER

## 2021-06-30 PROCEDURE — 2000000000 HC ICU R&B

## 2021-06-30 PROCEDURE — 80061 LIPID PANEL: CPT

## 2021-06-30 PROCEDURE — 85025 COMPLETE CBC W/AUTO DIFF WBC: CPT

## 2021-06-30 PROCEDURE — 83735 ASSAY OF MAGNESIUM: CPT

## 2021-06-30 PROCEDURE — 99221 1ST HOSP IP/OBS SF/LOW 40: CPT | Performed by: PSYCHIATRY & NEUROLOGY

## 2021-06-30 PROCEDURE — 2580000003 HC RX 258: Performed by: INTERNAL MEDICINE

## 2021-06-30 PROCEDURE — 70551 MRI BRAIN STEM W/O DYE: CPT

## 2021-06-30 PROCEDURE — C9113 INJ PANTOPRAZOLE SODIUM, VIA: HCPCS | Performed by: NURSE PRACTITIONER

## 2021-06-30 PROCEDURE — 84100 ASSAY OF PHOSPHORUS: CPT

## 2021-06-30 PROCEDURE — 93005 ELECTROCARDIOGRAM TRACING: CPT | Performed by: NURSE PRACTITIONER

## 2021-06-30 PROCEDURE — 6360000002 HC RX W HCPCS: Performed by: NURSE PRACTITIONER

## 2021-06-30 RX ORDER — SODIUM CHLORIDE 9 MG/ML
10 INJECTION INTRAVENOUS DAILY
Status: DISCONTINUED | OUTPATIENT
Start: 2021-06-30 | End: 2021-07-03 | Stop reason: HOSPADM

## 2021-06-30 RX ORDER — ATORVASTATIN CALCIUM 80 MG/1
80 TABLET, FILM COATED ORAL NIGHTLY
Status: DISCONTINUED | OUTPATIENT
Start: 2021-06-30 | End: 2021-06-30

## 2021-06-30 RX ORDER — HYDRALAZINE HYDROCHLORIDE 20 MG/ML
10 INJECTION INTRAMUSCULAR; INTRAVENOUS EVERY 6 HOURS PRN
Status: DISCONTINUED | OUTPATIENT
Start: 2021-06-30 | End: 2021-07-03 | Stop reason: HOSPADM

## 2021-06-30 RX ORDER — PANTOPRAZOLE SODIUM 40 MG/10ML
40 INJECTION, POWDER, LYOPHILIZED, FOR SOLUTION INTRAVENOUS DAILY
Status: DISCONTINUED | OUTPATIENT
Start: 2021-06-30 | End: 2021-07-03 | Stop reason: HOSPADM

## 2021-06-30 RX ORDER — SODIUM CHLORIDE 9 MG/ML
INJECTION, SOLUTION INTRAVENOUS CONTINUOUS
Status: DISCONTINUED | OUTPATIENT
Start: 2021-06-30 | End: 2021-07-03 | Stop reason: HOSPADM

## 2021-06-30 RX ORDER — POTASSIUM CHLORIDE 7.45 MG/ML
10 INJECTION INTRAVENOUS
Status: COMPLETED | OUTPATIENT
Start: 2021-06-30 | End: 2021-06-30

## 2021-06-30 RX ORDER — HYDRALAZINE HYDROCHLORIDE 20 MG/ML
10 INJECTION INTRAMUSCULAR; INTRAVENOUS EVERY 6 HOURS PRN
Status: DISCONTINUED | OUTPATIENT
Start: 2021-06-30 | End: 2021-06-30

## 2021-06-30 RX ADMIN — POTASSIUM CHLORIDE 10 MEQ: 7.46 INJECTION, SOLUTION INTRAVENOUS at 08:38

## 2021-06-30 RX ADMIN — PANTOPRAZOLE SODIUM 40 MG: 40 INJECTION, POWDER, FOR SOLUTION INTRAVENOUS at 08:38

## 2021-06-30 RX ADMIN — POTASSIUM CHLORIDE 10 MEQ: 7.46 INJECTION, SOLUTION INTRAVENOUS at 10:00

## 2021-06-30 RX ADMIN — SODIUM CHLORIDE: 9 INJECTION, SOLUTION INTRAVENOUS at 08:31

## 2021-06-30 RX ADMIN — SODIUM CHLORIDE, PRESERVATIVE FREE 10 ML: 5 INJECTION INTRAVENOUS at 08:39

## 2021-06-30 RX ADMIN — SODIUM CHLORIDE, PRESERVATIVE FREE 10 ML: 5 INJECTION INTRAVENOUS at 08:40

## 2021-06-30 ASSESSMENT — PAIN SCALES - PAIN ASSESSMENT IN ADVANCED DEMENTIA (PAINAD)
TOTALSCORE: 2
BREATHING: 0
BODYLANGUAGE: 0
BREATHING: 0
BODYLANGUAGE: 2
CONSOLABILITY: 0
TOTALSCORE: 0
NEGVOCALIZATION: 0
CONSOLABILITY: 0
FACIALEXPRESSION: 0
FACIALEXPRESSION: 0
TOTALSCORE: 2
NEGVOCALIZATION: 0
FACIALEXPRESSION: 0
CONSOLABILITY: 0
BREATHING: 0
FACIALEXPRESSION: 0
BREATHING: 0
BODYLANGUAGE: 2
CONSOLABILITY: 0
BODYLANGUAGE: 2
NEGVOCALIZATION: 0
CONSOLABILITY: 0
NEGVOCALIZATION: 0
TOTALSCORE: 2
BREATHING: 0
TOTALSCORE: 0
BODYLANGUAGE: 0
NEGVOCALIZATION: 0
FACIALEXPRESSION: 0

## 2021-06-30 NOTE — PROGRESS NOTES
Dr. Tyler Ceja (covering for Dr. Gina Caldera) notified via perfect serve of pt arrival to MICU. New orders obtained.

## 2021-06-30 NOTE — H&P
Sam Gomez M.D. History and Physical      CHIEF COMPLAINT: Altered mental status    Reason for Admission: Concern for acute stroke    History Obtained From: EMR    HISTORY OF PRESENT ILLNESS:      The patient is a 80 y.o. female of [place] MD with significant past medical history of uncontrolled hypertension who presents with complaints per family of sudden onset confusion, altered mental status while she was out having ice cream with her family. Patient was unable to eat the ice cream and became extremely agitated. Family brought her into the emergency department secondary to concern for stroke. According to ER records, there is no note made of unilateral flaccidity. Reportedly only mental status changes. Telestroke team was contacted and patient was subsequently administered IV TPA based on meeting criteria. Head CT without any acute abnormalities  CTA and CTP were also performed which revealed small region of right frontal ischemia and right M3 occlusion. Now MRI reveals bilateral cerebral hemorrhagic foci more numerous on the left the largest measuring 3 x 2-1/2 cm in left parietal lobe. On my evaluation, patient is not able to be aroused however does respond to painful stimuli with sternal rub. Not able to follow commands or answer any questions.     All labs personally reviewed   All imaging personally reviewed     Past Medical History:        Diagnosis Date    Hypertension      Past Surgical History:        Procedure Laterality Date    APPENDECTOMY      BLADDER REPAIR      CATARACT REMOVAL      HYSTERECTOMY      JOINT REPLACEMENT           Medications Prior to Admission:    Medications Prior to Admission: clopidogrel (PLAVIX) 75 MG tablet, Take 75 mg by mouth every other day  bisacodyl (DULCOLAX) 5 MG EC tablet, Take 5 mg by mouth daily as needed for Constipation  psyllium (KONSYL) 28.3 % PACK, Take 1 packet by mouth daily  escitalopram (LEXAPRO) 5 MG tablet, TAKE ONE TABLET BY MOUTH ONCE A DAY  amLODIPine (NORVASC) 5 MG tablet, Take 5 mg by mouth as needed     Allergies:  Ciprofloxacin, Lisinopril, and Statins    Social History:   TOBACCO:   reports that she has never smoked. She has never used smokeless tobacco.  ETOH:   reports no history of alcohol use. MARITAL STATUS:    OCCUPATION:      Family History:   No family history on file. REVIEW OF SYSTEMS:    General ROS: Unable to obtain  Hematological and Lymphatic ROS: negative  Endocrine ROS: negative  Respiratory ROS: no cough, shortness of breath, or wheezing  Cardiovascular ROS: no chest pain or dyspnea on exertion  Gastrointestinal ROS: no abdominal pain, change in bowel habits, or black or bloody stools  Genito-Urinary ROS: no dysuria, trouble voiding, or hematuria  Neurological ROS: no TIA or stroke symptoms  negative    Vitals:  BP (!) 150/68   Pulse 61   Temp 97 °F (36.1 °C) (Temporal)   Resp 18   Ht 5' 1\" (1.549 m)   Wt 140 lb (63.5 kg)   SpO2 98%   BMI 26.45 kg/m²     PHYSICAL EXAM:  General: Not responding to verbal stimuli, winces to sternal rub, elderly-appearing lady of stated age  HEENT:  Normocephalic, atraumatic. Pupils equal, round, reactive to light. No scleral icterus. No conjunctival injection. Normal lips, teeth, and gums. No nasal discharge. Neck:  Supple, FROM  Heart:  RRR, no murmurs, gallops, rubs, carotid upstroke normal, no carotid bruits  Lungs:  CTA bilaterally, bilat symmetrical expansion, no wheeze, rales, or rhonchi  Abdomen:   Bowel sounds present, soft, nontender, no masses, no organomegaly, no peritoneal signs  Extremities:  No clubbing, cyanosis, or edema  Skin:  Warm and dry, no open lesions or rash  Neuro:  Cranial nerves 2-12 intact, no focal deficits  Vascular: Radial and pedal Pulses 2+  Breast: deferred  Rectal: deferred  Genitalia:  deferred      DATA:     Recent Labs     06/29/21  1839 06/30/21  0857   WBC 9.3 9.4   HGB 16.8* 16.1*    242 Recent Labs     06/29/21  1839 06/30/21  0422    139   K 3.3* 3.7   BUN 17 15   CREATININE 0.8 0.8     Recent Labs     06/29/21  1810   INR 1.1     No results for input(s): AST, ALT, ALKPHOS, BILIDIR, BILITOT in the last 72 hours. No results for input(s): BNP in the last 72 hours. No results for input(s): CKTOTAL, CKMB, CKMBINDEX, TROPONINI in the last 72 hours. ASSESSMENT:      Active Problems:    Stroke aborted by administration of thrombolytic agent [facility])  Resolved Problems:    * No resolved hospital problems.  *        PLAN:    Admit to medical intensive care unit for observation post IV TPA for acute ischemic stroke  Above course complicated by hemorrhagic conversion bilateral hemispheres larger on the left than right  Patient not able to be aroused on my evaluation  Neurology following  ICU team following  Await progression  CODE STATUS is limited without intubation  We will continue to follow    DVT prophylaxis  PT OT  Discharge plan    Elena Lua MD  6/30/2021  12:21 PM

## 2021-06-30 NOTE — PROGRESS NOTES
Speech Language Pathology      NAME:  Lupillo Colon  :  1926  DATE: 2021  ROOM:  [address] [place] [address]-A    Orders were placed for a SLP swallowing-dysphagia evaluation and treatment (2021) & Speech Language Pathology (SLP) eval and treat (2021). Patient was sedated when entering the room, accompanied by her son. SLP seated the patient up right and an attempt to awake and orient the patient was non-successful. A dry spoon was placed on patient's lower lip in order to elicit a labial closure response. Patient had no response to dry spoon. Patient was not appropriate to continue evaluation. Will re-attempt at a later time when patient is alert and oriented.     Stroke aborted by administration of thrombolytic agent [facility]) [I63.9]    John Solis  SLP Student Intern

## 2021-06-30 NOTE — PROGRESS NOTES
Updated family (daughter) regards to MRI result, would like to call [place] for last right. Will consult palliative care to transition goals of care. Neurology notify via RN. Will discontinue lovenox and aspirin given brain bleed. Code stated:DNR-limited, no intubation, no CPR, no def. Discuss regards to NG tube placement, family refuse, patient would not want that.

## 2021-06-30 NOTE — PROGRESS NOTES
Physical Therapy  Name: Tania Wells  Room: 6402/7623-I  Date: 06/30/21    PT consult received and appreciated. Eval on hold at this time per RN for lethargy/command following.      Sunny Adan, PT, DPT  [id number]

## 2021-06-30 NOTE — CONSULTS
Exam  Constitutional:       General: She is not in acute distress. HENT:      Head: Normocephalic and atraumatic. Mouth/Throat:      Mouth: Mucous membranes are dry. Pharynx: Oropharynx is clear. Eyes:      Conjunctiva/sclera: Conjunctivae normal.   Cardiovascular:      Rate and Rhythm: Regular rhythm. Heart sounds: Normal heart sounds. Pulmonary:      Breath sounds: Normal breath sounds. Musculoskeletal:         General: No swelling. Cervical back: Neck supple. Right lower leg: No edema. Left lower leg: No edema. Skin:     General: Skin is warm and dry. Coloration: Skin is pale. Neurological:      Cranial Nerves: Dysarthria present. Deep Tendon Reflexes:      Reflex Scores:       Achilles reflexes are 0 on the right side and 0 on the left side. Neurological Exam  Mental Status  Arousable to tactile stimuli. Severe dysarthria present. Squeeze hands intermittently mumbles incomprehensable words. Not following commands. Will attend examiner for short time. .    Cranial Nerves  Testing is limited. Pupils are equal and reactive  Gaze is conjugate. Face is grossly symmetric. .    Motor    Moves extremities with antigravity strength without apparent favoring one side or the other. Decreased bulk tone normal. .    Sensory  Reacts to stimuli bilaterally x4.     Reflexes                                           Right                      Left  Biceps                                 Tr                         Tr  Triceps                                Tr                         Tr  Patellar                                Tr                         Tr  Achilles                                0                         0      Laboratory/Radiology:     CBC with Differential:    Lab Results   Component Value Date    WBC 9.4 06/30/2021    RBC 5.99 06/30/2021    HGB 16.1 06/30/2021    HCT 51.1 06/30/2021     06/30/2021    MCV 85.3 06/30/2021    MCH 26.9 06/30/2021    MCHC 31.5 06/30/2021    RDW 13.8 06/30/2021    LYMPHOPCT 17.5 06/30/2021    MONOPCT 9.8 06/30/2021    BASOPCT 0.2 06/30/2021    MONOSABS 0.92 06/30/2021    LYMPHSABS 1.65 06/30/2021    EOSABS 0.14 06/30/2021    BASOSABS 0.02 06/30/2021     CMP:    Lab Results   Component Value Date     06/30/2021    K 3.7 06/30/2021    K 3.3 06/29/2021     06/30/2021    CO2 26 06/30/2021    BUN 15 06/30/2021    CREATININE 0.8 06/30/2021    GFRAA >60 06/30/2021    LABGLOM >60 06/30/2021    GLUCOSE 143 06/30/2021    PROT 6.8 05/30/2021    LABALBU 3.7 05/30/2021    CALCIUM 9.8 06/30/2021    BILITOT 0.5 05/30/2021    ALKPHOS 130 05/30/2021    AST 20 05/30/2021    ALT 11 05/30/2021   cta      Impression   1. Estimated stenosis of the proximal right and left internal carotid   artery by NASCET criteria is not hemodynamically significant   2. Moderate atherosclerotic disease . 3. Right M3 occlusion         CTP:         Impression       There is a small region of right frontal ischemia       This study was analyzed by the V[employer] ai algorithm.               I independently reviewed the labs and imaging studies at today's appointment. Assessment:     Possible small stroke although these would not be typical symptoms. It is possible that neuroimaging was over-read and this presentation is more of an acute confusional state related to underlying dementia and a busy day. Plan:     MRI brain will be helpful to determine if vacular event occurred and if more aggressive antiplt therapy is appropriate.        Theo Vazquez MD  10:36 AM  6/30/2021

## 2021-06-30 NOTE — CONSULTS
Critical Care Consult Note    Patient Saurav Wells   MRN -  [id number]   Carola # - [de-identified]   - 1926      Date of Admission -  2021  6:00 PM  Date of evaluation -  2021  4401/4401-A   Hospital Day - 1          ADMIT/CONSULT DETAILS     Reason for Admit/Consult   Stroke s/p TPA  [facility]    Finn Moses MD  Primary Care Physician - Sina Pretty MD         [facility]   The patient is a 80 y.o. female with significant past medical history of 2, hypertension, presented with altered mental status. Per family patient was in the car eating ice cream and suddenly became altered. Patient was having trouble eating ice cream and became agitated therefore brought to ED. Upon ED work-up, patient was tachycardic, blood pressure 173/94, 98% on room air. Patient was agitated and uncooperative therefore received Ativan and ketamine. Patient was trying to get out of bed and take off her clothes. CT of head showed right M3 occlusion, stenosis of the proximal right and left internal carotid artery, moderate atherosclerotic disease. CT brain perfusion showed small vision of right frontal ischemia. CT head without contrast was negative for acute intracranial abnormalities. Chest x-ray was negative. Patient does take Plavix at home. Stroke alert was called, was given TPA and admitted to ICU for close monitoring and critical care was consulted. Patient seen and examined in MICU 4401, alert and oriented to self only, opens eyes does not follow commands. Patient seems agitated currently restrained on Precedex drip. Unable to obtain review of system or past medical history secondary to mentation, all information obtained through medical records. Currently hemodynamically stable.   Past Medical History         Diagnosis Date    Hypertension         Past Surgical History           Procedure Laterality Date    APPENDECTOMY      BLADDER REPAIR      CATARACT REMOVAL      HYSTERECTOMY      JOINT REPLACEMENT           Influenza: Indicated for current flu vaccination season Oct. to Feb.  Pneumococcal Polysaccharide:  Indicated for current flu vaccination season Oct. to Feb.    Current Medications   Current Medications    sodium chloride  1,000 mL Intravenous Once    sodium chloride flush  10 mL Intravenous Once    alteplase  0.09 mg/kg Intravenous Once    Followed by   Do See alteplase  0.81 mg/kg Intravenous Once    Followed by   Do See sodium chloride  50 mL Intravenous Once    amLODIPine  5 mg Oral Daily    escitalopram  5 mg Oral Daily    sodium chloride flush  5-40 mL Intravenous 2 times per day    enoxaparin  40 mg Subcutaneous Daily    aspirin  81 mg Oral Daily    Or    aspirin  300 mg Rectal Daily    ketamine         sodium chloride flush, sodium chloride, ondansetron **OR** ondansetron, polyethylene glycol  IV Drips/Infusions   sodium chloride      dexmedetomidine (PRECEDEX) IV infusion 0.7 mcg/kg/hr (06/30/21 0257)     Home Medications  Medications Prior to Admission: clopidogrel (PLAVIX) 75 MG tablet, Take 75 mg by mouth every other day  bisacodyl (DULCOLAX) 5 MG EC tablet, Take 5 mg by mouth daily as needed for Constipation  psyllium (KONSYL) 28.3 % PACK, Take 1 packet by mouth daily  escitalopram (LEXAPRO) 5 MG tablet, TAKE ONE TABLET BY MOUTH ONCE A DAY  amLODIPine (NORVASC) 5 MG tablet, Take 5 mg by mouth as needed     Diet/Nutrition   Diet NPO    Allergies   Ciprofloxacin, Lisinopril, and Statins    Social History   Tobacco   reports that she has never smoked. She has never used smokeless tobacco.    Alcohol     reports no history of alcohol use. SOCIAL AND OCCUPATIONAL HEALTH: Unable to obtain secondary to mentation. Occupational history :    Family History   No family history on file. Sleep History   n/a    ROS   REVIEW OF SYSTEMS:  Unable to obtain secondary to altered mental status. Lines and Devices   Peripheral IV.     Mechanical Ventilation Data VENT SETTINGS (Comprehensive)  Vent Information  SpO2: 96 %  Additional Respiratory  Assessments  Pulse: 74  Resp: 18  SpO2: 96 %  Oral Care: Mouthwash, Mouth swabbed    ABG  No results found for: PH, PCO2, PO2, HCO3, O2SAT  No results found for: IFIO2, MODE, SETTIDVOL, SETPEEP        Vitals    height is 5' 1\" (1.549 m) and weight is 140 lb (63.5 kg). Her temporal temperature is 97.4 °F (36.3 °C). Her blood pressure is 171/89 (abnormal) and her pulse is 74. Her respiration is 18 and oxygen saturation is 96%. Temperature Range: Temp: 97.4 °F (36.3 °C) Temp  Av.1 °F (36.7 °C)  Min: 97.4 °F (36.3 °C)  Max: 98.9 °F (37.2 °C)  BP Range:  Systolic (78ABB), CLT:147 , Min:87 , OGR:710     Diastolic (16YMN), QYK:99, Min:50, Max:119    Pulse Range: Pulse  Av.3  Min: 66  Max: 135  Respiration Range: Resp  Av  Min: 14  Max: 28  Current Pulse Ox[de-identified]  SpO2: 96 %  24HR Pulse Ox Range:  SpO2  Av.9 %  Min: 95 %  Max: 98 %  Oxygen Amount and Delivery: O2 Flow Rate (L/min): 2 L/min      I/O (24 Hours)    Patient Vitals for the past 8 hrs:   BP Temp Temp src Pulse Resp SpO2   21 0600 (!) 171/89   74 18    21 0500 118/69   66 16 96 %   21 0407 109/70   75 15 97 %   21 0400 109/70 97.4 °F (36.3 °C) Temporal 78 15 95 %   21 0307 118/73   78 17 98 %   21 0300 118/73   72 21    21 0237 127/69   68 19    21 0207    75 17    21 0200    90 23    21 0137    93 16    21 0107    71 18 97 %   21 0100    72 16 98 %   21 0037    74 18    21 0007 (!) 87/50   105 21    21 0000 (!) 50 98.9 °F (37.2 °C) Axillary 93 15 96 %       Intake/Output Summary (Last 24 hours) at 2021 0737  Last data filed at 2021 0600  Gross per 24 hour   Intake 85 ml   Output    Net 85 ml     I/O last 3 completed shifts:   In: 80 [I.V.:85]  Out: -    Date 21 0000 - 21 2359   Shift 9690-1617 6832-0074 1449-8229 24 Hour Total   INTAKE   P.O. 0   0   I. V.(mL/kg/hr) 85   85   Shift Total(mL/kg) 85(1.3)   85(1.3)   OUTPUT   Shift Total(mL/kg)       Weight (kg) 63.5 63.5 63.5 63.5     Patient Vitals for the past 96 hrs (Last 3 readings):   Weight   06/29/21 2200 140 lb (63.5 kg)   06/29/21 1915 133 lb 13.1 oz (60.7 kg)         Drains/Tubes Outputs      Exam   PHYSICAL EXAM:  CONSTITUTIONAL: Awake, alert and oriented to self only, agitated on Precedex drip, does not look in respiratory distress. EYES:  Lids and lashes normal, pupils equal, round and reactive to light, extra ocular muscles intact, sclera clear, conjunctiva normal  ENT:  Normocephalic, without obvious abnormality, atraumatic, sinuses nontender on palpation, external ears without lesions, oral pharynx with moist mucus membranes, tonsils without erythema or exudates, gums normal and good dentition. NECK:  Supple, symmetrical, trachea midline, no adenopathy, thyroid symmetric, not enlarged and no tenderness, skin normal  LUNGS:  No increased work of breathing, good air exchange, clear to auscultation bilaterally, no crackles or wheezing  CARDIOVASCULAR:  Normal apical impulse, regular rate and rhythm, normal S1 and S2, no S3 or S4, and no murmur noted  ABDOMEN:  No scars, normal bowel sounds, soft, non-distended, non-tender, no masses palpated, no hepatosplenomegally  MUSCULOSKELETAL: Moves all extremities without difficulty, peripheral pulses 2+, no edema noted. Sensation intact, motor intact. NEUROLOGIC: Limited neuro exam, awake, oriented to self only.   SKIN:  no bruising or bleeding, normal skin color, texture, turgor, no redness, warmth, or swelling, no rashes and no lesions    Data   Old records and images have been reviewed    Lab Results   CBC     Lab Results   Component Value Date    WBC 9.3 06/29/2021    RBC 6.19 06/29/2021    HGB 16.8 06/29/2021    HCT 52.0 06/29/2021     06/29/2021    MCV 84.0 06/29/2021    MCH 27.1 06/29/2021    [facility]    RDW 14.2 06/29/2021    LYMPHOPCT 32.6 06/29/2021    MONOPCT 10.6 06/29/2021    BASOPCT 0.2 06/29/2021    MONOSABS 0.98 06/29/2021    LYMPHSABS 3.02 06/29/2021    EOSABS 0.32 06/29/2021    BASOSABS 0.02 06/29/2021       BMP   Lab Results   Component Value Date     06/30/2021    K 3.7 06/30/2021    K 3.3 06/29/2021     06/30/2021    CO2 26 06/30/2021    BUN 15 06/30/2021    CREATININE 0.8 06/30/2021    GLUCOSE 143 06/30/2021    CALCIUM 9.8 06/30/2021       LFTS  Lab Results   Component Value Date    ALKPHOS 130 05/30/2021    ALT 11 05/30/2021    AST 20 05/30/2021    PROT 6.8 05/30/2021    BILITOT 0.5 05/30/2021    LABALBU 3.7 05/30/2021       INR  Recent Labs     06/29/21  1810   PROTIME 11.8   INR 1.1       APTT  Recent Labs     06/29/21  1810   APTT 30.1       Lactic Acid  Lab Results   Component Value Date    LACTA 1.8 05/30/2021        BNP   No results for input(s): BNP in the last 72 hours. Cultures   No results for input(s): BC in the last 72 hours. No results for input(s): Gevena Samantha in the last 72 hours. No results for input(s): LABURIN in the last 72 hours. Imaging Study      CTA HEAD W CONTRAST   Final Result   1. Estimated stenosis of the proximal right and left internal carotid   artery by NASCET criteria is not hemodynamically significant   2. Moderate atherosclerotic disease . 3. Right M3 occlusion            This study was analyzed by the [address] ai algorithm. CTA NECK W CONTRAST   Final Result   1. Estimated stenosis of the proximal right and left internal carotid   artery by NASCET criteria is not hemodynamically significant   2. Moderate atherosclerotic disease . 3. Right M3 occlusion            This study was analyzed by the [address] ai algorithm. CT BRAIN PERFUSION   Final Result      There is a small region of right frontal ischemia      This study was analyzed by the V[employer] ai algorithm.             CT Head WO Contrast   Final Result   Addendum 1 of 1   ADDENDUM:   Critical results were called by Dr. Charles Batista to Dr. Jessa Ch on   6/29/2021 at 6:54 p.m. Marianna Gibbs Final   No acute intracranial abnormality. XR CHEST PORTABLE   Final Result   No acute process. CT head without contrast    (Results Pending)       APRN- CNP Assessment and PLan   In summary, 80 y.o. female with significant past medical history of 2, hypertension, presented with altered mental status. Per family patient was in the car eating ice cream and suddenly became altered found to have right M3 occulusion s/p TPA, admitted to ICU. Assessment:  · Altered mental status   · Right M3 Occulusion s/p TPA (6/29)  · Agitation   · Hx of dementia  · Hx of hypertension      Plan:  - Currently oxygenating adequately on 2LNC, Wean FIO2 to keep SPO2 goal above 92%  - Monitor tissue perfussion closely next 24 hours  - Agitation; currently on precedex gtt   - Right M3 occulusion s/p TPA (6.29 at 19:07); TPA protocol  - Neurology consulted  - Neuro assessment q1H, per TPA  - Keep SBP below 180/100  - SCD only for DVT prophylaxis in the first 24 hours  - Aspirin 81mg after 24 hours of TPA  - Continue Amlodipine   - repeat CT head in evening post TPA  - UA and urine culture pending  - Labs and cxr in AM  - Speech consult  - given AMS- will place NG   - PT/OT  - F/E/N  [email or web address] replace K/ NPO until eval by speech  - DVT/GI  SCDS/ Hold lovenox s/p TPA x24 hours/ Protonix  - Code status limited      NASIM Guzman-KAUSHAL   Critical Care     Discussed case with Attending Physician: Dr. Missy Hair     Addendum :  Patient scheduled for MRI, will continue precedex till after MRI to keep her calm with less motion artifact. Will hold off NGT for now, son wants us to discuss with his sister. Hydralazine 10 mg Q6 hrs prn. Obtain ECG and follow troponin    I personally saw, examined and provided care for the patient.  Radiographs, labs and medication list were reviewed by me independently. I spoke with bedside nursing, therapists and consultants. Critical care services and times documented are independent of procedures and multidisciplinary rounds with CNP. Additionally comprehensive, multidisciplinary rounds were conducted with the MICU team. The case was discussed in detail and plans for care were established. Review of CNP documentation was conducted and revisions were made as appropriate. I agree with the above documented exam, problem list and plan of care.   CCT excluding procedures:40'

## 2021-06-30 NOTE — CARE COORDINATION
6/30 Care Coordination: Pt presents to the emergency department with altered mental status. Per family patient was in the car eating ice cream when she suddenly became altered. In ED Stroke alert called. TPA was given. Pt admit to MICU. Pt's family noted that her memory is declining in the last 2 years, worsen in the last 6 months. Met with Teresa's son Livia English. Discussed discharge Rehab planing. PTA his mom lives with his sister Sofiya Enrique and . She lived on her own until about 1yr ago. She was still indep. Ambulated with [employer]. She still helped with light chores around the house. Livia English was given [employer] for [facility]. He will review with his sister. Will get back with CM tomorrow. When stable will start PT/OT/Speech  CM/SW will continue to follow for discharge planning.    Ronald JIMENEZ,RN-CV-BC  [phone]

## 2021-06-30 NOTE — PROGRESS NOTES
Patient down to MRI with RN escort, monitor and oxygen. Once [facility] lowered patient began to attempt getting out of bed and vocalizing what sounded like \"Pee\". Comfort measures provided without helping. Precedex increased for rass of +4 and patient was able to complete MRI. When patient came out of MRI room she began to get restless once more. Left precedex at level given prior to MRI. Arrived to room and turned down to original dose. Daughter at bedside, explained situation. No questions verbalized.   Current RASS is -1

## 2021-06-30 NOTE — PLAN OF CARE
Problem: Skin Integrity:  Goal: Will show no infection signs and symptoms  6/30/2021 0739 by Rufino Caruso RN  Outcome: Met This Shift     Problem: Skin Integrity:  Goal: Absence of new skin breakdown  6/30/2021 0739 by Rufino Caruso RN  Outcome: Met This Shift     Problem: Falls - Risk of:  Goal: Will remain free from falls  6/30/2021 0739 by Rufino Caruso RN  Outcome: Met This Shift     Problem: Falls - Risk of:  Goal: Absence of physical injury  6/30/2021 0739 by Rufino Caruso RN  Outcome: Met This Shift     Problem: Non-Violent Restraints  Goal: Removal from restraints as soon as assessed to be safe  Outcome: Not Met This Shift     Problem: Non-Violent Restraints  Goal: No harm/injury to patient while restraints in use  Outcome: Met This Shift     Problem: Non-Violent Restraints  Goal: Patient's dignity will be maintained  Outcome: Met This Shift

## 2021-07-01 LAB
EKG ATRIAL RATE: 61 BPM
EKG P AXIS: 63 DEGREES
EKG P-R INTERVAL: 168 MS
EKG Q-T INTERVAL: 492 MS
EKG QRS DURATION: 88 MS
EKG QTC CALCULATION (BAZETT): 495 MS
EKG R AXIS: -14 DEGREES
EKG T AXIS: -122 DEGREES
EKG VENTRICULAR RATE: 61 BPM
MRSA CULTURE ONLY: NORMAL

## 2021-07-01 PROCEDURE — 2700000000 HC OXYGEN THERAPY PER DAY

## 2021-07-01 PROCEDURE — 99222 1ST HOSP IP/OBS MODERATE 55: CPT | Performed by: NURSE PRACTITIONER

## 2021-07-01 PROCEDURE — 6360000002 HC RX W HCPCS: Performed by: INTERNAL MEDICINE

## 2021-07-01 PROCEDURE — 2580000003 HC RX 258: Performed by: NURSE PRACTITIONER

## 2021-07-01 PROCEDURE — 2580000003 HC RX 258: Performed by: INTERNAL MEDICINE

## 2021-07-01 PROCEDURE — 93010 ELECTROCARDIOGRAM REPORT: CPT | Performed by: INTERNAL MEDICINE

## 2021-07-01 PROCEDURE — 99232 SBSQ HOSP IP/OBS MODERATE 35: CPT | Performed by: CLINICAL NURSE SPECIALIST

## 2021-07-01 PROCEDURE — C9113 INJ PANTOPRAZOLE SODIUM, VIA: HCPCS | Performed by: NURSE PRACTITIONER

## 2021-07-01 PROCEDURE — 6360000002 HC RX W HCPCS: Performed by: NURSE PRACTITIONER

## 2021-07-01 PROCEDURE — 1200000000 HC SEMI PRIVATE

## 2021-07-01 RX ORDER — MORPHINE SULFATE 2 MG/ML
0.5 INJECTION, SOLUTION INTRAMUSCULAR; INTRAVENOUS EVERY 4 HOURS PRN
Status: DISCONTINUED | OUTPATIENT
Start: 2021-07-01 | End: 2021-07-03 | Stop reason: HOSPADM

## 2021-07-01 RX ORDER — LORAZEPAM 2 MG/ML
1 INJECTION INTRAMUSCULAR
Status: DISCONTINUED | OUTPATIENT
Start: 2021-07-01 | End: 2021-07-01

## 2021-07-01 RX ORDER — ACETAMINOPHEN 650 MG/1
650 SUPPOSITORY RECTAL EVERY 4 HOURS PRN
Status: DISCONTINUED | OUTPATIENT
Start: 2021-07-01 | End: 2021-07-03 | Stop reason: HOSPADM

## 2021-07-01 RX ORDER — FENTANYL CITRATE 50 UG/ML
25 INJECTION, SOLUTION INTRAMUSCULAR; INTRAVENOUS
Status: DISCONTINUED | OUTPATIENT
Start: 2021-07-01 | End: 2021-07-01

## 2021-07-01 RX ORDER — MIDAZOLAM HYDROCHLORIDE 2 MG/2ML
1 INJECTION, SOLUTION INTRAMUSCULAR; INTRAVENOUS
Status: DISCONTINUED | OUTPATIENT
Start: 2021-07-01 | End: 2021-07-01

## 2021-07-01 RX ORDER — MORPHINE SULFATE 2 MG/ML
2 INJECTION, SOLUTION INTRAMUSCULAR; INTRAVENOUS EVERY 4 HOURS PRN
Status: DISCONTINUED | OUTPATIENT
Start: 2021-07-01 | End: 2021-07-01

## 2021-07-01 RX ADMIN — LORAZEPAM 1 MG: 2 INJECTION INTRAMUSCULAR; INTRAVENOUS at 02:32

## 2021-07-01 RX ADMIN — HYDRALAZINE HYDROCHLORIDE 10 MG: 20 INJECTION INTRAMUSCULAR; INTRAVENOUS at 21:02

## 2021-07-01 RX ADMIN — SODIUM CHLORIDE, PRESERVATIVE FREE 10 ML: 5 INJECTION INTRAVENOUS at 09:07

## 2021-07-01 RX ADMIN — SODIUM CHLORIDE, PRESERVATIVE FREE 10 ML: 5 INJECTION INTRAVENOUS at 21:00

## 2021-07-01 RX ADMIN — SODIUM CHLORIDE, PRESERVATIVE FREE 10 ML: 5 INJECTION INTRAVENOUS at 09:09

## 2021-07-01 RX ADMIN — PANTOPRAZOLE SODIUM 40 MG: 40 INJECTION, POWDER, FOR SOLUTION INTRAVENOUS at 09:07

## 2021-07-01 RX ADMIN — MORPHINE SULFATE 2 MG: 2 INJECTION, SOLUTION INTRAMUSCULAR; INTRAVENOUS at 02:32

## 2021-07-01 RX ADMIN — SODIUM CHLORIDE: 9 INJECTION, SOLUTION INTRAVENOUS at 19:24

## 2021-07-01 ASSESSMENT — PAIN SCALES - PAIN ASSESSMENT IN ADVANCED DEMENTIA (PAINAD)
BODYLANGUAGE: 0
BREATHING: 0
BREATHING: 0
TOTALSCORE: 0
CONSOLABILITY: 0
FACIALEXPRESSION: 0
TOTALSCORE: 0
NEGVOCALIZATION: 0
FACIALEXPRESSION: 0
NEGVOCALIZATION: 0
BODYLANGUAGE: 0
CONSOLABILITY: 0

## 2021-07-01 ASSESSMENT — PAIN SCALES - GENERAL: PAINLEVEL_OUTOF10: 2

## 2021-07-01 NOTE — PROGRESS NOTES
Patient ID: Umu Guaman 2005    Chief Complaint   Patient presents with    Asthma    ADD         Asthma   The current episode started more than 1 year ago. The problem occurs rarely. The problem has been rapidly improving since onset. Pertinent negatives include no coughing or wheezing. Past treatments include beta-agonist inhalers. The treatment provided significant relief. Her past medical history is significant for asthma. She has been behaving normally. ADD: doing much better on the Concerta. Grades improved. Would like to continue the Concerta    Review of Systems   Constitutional: Negative for activity change, appetite change and unexpected weight change. Respiratory: Negative for cough and wheezing. Patient Active Problem List   Diagnosis    Asthma    Peanut allergy    Family history of alpha 1 antitrypsin deficiency    Attention deficit disorder (ADD) without hyperactivity       Past Surgical History:   Procedure Laterality Date    TONSILLECTOMY         Family History   Problem Relation Age of Onset    Asthma Mother        Current Outpatient Medications on File Prior to Visit   Medication Sig Dispense Refill    ibuprofen (ADVIL;MOTRIN) 200 MG tablet Take 200 mg by mouth every 6 hours as needed for Pain      EPINEPHrine (EPIPEN 2-NICO) 0.3 MG/0.3ML SOAJ injection Inject 0.3 mLs into the muscle once as needed (anaphyllaxis) 2 each 2    albuterol (PROVENTIL) (2.5 MG/3ML) 0.083% nebulizer solution Take 3 mLs by nebulization every 4 hours as needed for Wheezing or Shortness of Breath 25 Package 1    ipratropium (ATROVENT) 0.03 % nasal spray 2 sprays by Nasal route 3 times daily (Patient not taking: Reported on 2/27/2020) 1 Bottle 0    salicylic acid 17 % gel Apply topically daily Apply topically daily.  (Patient not taking: Reported on 1/2/2020) 1.25 g 0    acetaminophen (TYLENOL) 500 MG tablet Take 500 mg by mouth every 6 hours as needed for Pain       Current Physical Therapy  Name: [facility]  Room: 3409/7147-L  Date: 07/01/21    Pt not appropriate for PT intervention at this time. Please reconsult as appropriate.      Warren Pepper, PT, DPT  [id number] albuterol sulfate HFA (PROVENTIL HFA) 108 (90 Base) MCG/ACT inhaler   2. Attention deficit disorder (ADD) without hyperactivity  methylphenidate (CONCERTA) 27 MG extended release tablet    methylphenidate (CONCERTA) 27 MG extended release tablet           Plan:      Controlled Substance Monitoring:    Acute and Chronic Pain Monitoring:   RX Monitoring 2/12/2020   Attestation -   Periodic Controlled Substance Monitoring No signs of potential drug abuse or diversion identified. Controlled Substance Monitoring:    Acute and Chronic Pain Monitoring:   RX Monitoring 2/12/2020   Attestation -   Periodic Controlled Substance Monitoring No signs of potential drug abuse or diversion identified. Asthma doing well. Continue with as needed albuterol rescue inhaler. Continue with 2 puffs just before exercising    Attention deficit doing much better. Continue with the Concerta for the rest of the school year. Recheck in August.    Return in about 6 months (around 8/17/2020) for ADD, Asthma.

## 2021-07-01 NOTE — PROGRESS NOTES
[address] [facility]  Department of Critical Care Medicine   MICU Progress Note    Patient:  Bella Terry /Age/Sex: 1926  (80 y.o. female)   MRN & CSN:  [id number] & [id number] Admission Date/Time: 2021  6:00 PM   Allergy: Ciprofloxacin, Lisinopril, and Statins Date of Service: 2021       Hospital Day: 3    Supervising/Attending Physician: Dr. Mary Porras     Chief Complaint: Altered mental status, concern for acute stroke    Subjective:   Bella Terry is a 80 y.o. female with past medical history of uncontrolled hypertension, bilateral carotid artery stenosis (2 years ago), and expressive aphasia (2 years ago) was initially admitted with chief complaint of sudden onset confusion, altered mental status while she was out having ice cream with her family. Family brought patient into the ED with concern for stroke. Telestroke team was contacted and patient was subsequently administered IV TPA based on meeting criteria. Head CT without any acute abnormalities  CTA and CTP were also performed which revealed small region of right frontal ischemia and right M3 occlusion. MRI reveals bilateral cerebral hemorrhagic foci more numerous on the left parietal lobe. Patient was seen and examined. She can say some few words but not clear. She comfortably resting in bed at this time. sent Illness:ry of Present Illness:Oiistory of Present Illness:story of Present Illness:istory of Present Ills  Overnight change: No other acute events reported overnight. ROS: Ten point ROS reviewed and negative, unless as noted above per HPI. Objective:     Vital Signs:   Vitals:    21 1200 21 1300 21 1400 213   BP:    (!) 175/110   Pulse: 68 77 88    Resp: 18 19 19    Temp:    100.3 °F (37.9 °C)   TempSrc:    Axillary   SpO2: 96% 95% 96%    Weight:       Height:       Weight 63.5 kg/ Body mass index is 26.45 kg/m².     I & O - 24hr:     Intake/Output Summary (Last 24 hours) at 2021 1844  Last data filed at 7/1/2021 1400  Gross per 24 hour   Intake 421 ml   Output    Net 421 ml     Physical Exam: 07/01/21  GEN  -Awake, alert, appearing confused Frail, elderly female, lying in bed in no apparent distress, cooperative but unable to give adequate history. Appears given age. EYES -Pupils are equally round. No vision changes. No scleral erythema, discharge, or conjunctivitis. HENT -MM are moist. Oral pharynx without exudates, no evidence of thrush. NECK -Supple, no apparent thyromegaly or masses. RESP -LS C/D air exchange bilateral, no wheezes, rales or rhonchi. Symmetric chest movement. No respiratory distress noted. C/V  -S1/S2 auscultated. RRR without appreciable M/R/G. No JVD or carotid bruits. Peripheral pulses equal bilaterally and palpable. Peripheral pulses equal bilaterally and palpable. No peripheral edema. No reproducible chest wall tenderness. GI  -Abdomen is soft, round, non-distended, no significant tenderness. No masses or guarding. + BS in all quadrants. Rectal exam deferred.   -Vergara catheter is not present. LYMPH  -No palpable cervical lymphadenopathy and no hepatosplenomegaly. No petechiae or ecchymoses. MS  -B/L extremities weak muscles strength. Full movements. No gross joint deformities. No swelling, intact sensation symmetrical.   SKIN  -Normal coloration, warm, dry. No open wounds or ulcers. NEURO  -CN 2-12 appear grossly intact, speech not clear with h/o expressive aphasia, no lateralizing weakness. PSYC  -Awake, alert, oriented only to self. Limited verbal interaction due to present condition.      Lines:     site day   Peripheral IV  left forearm  Right forearm  06/29/2021 06/30/2021     Oxygen:    nasal canula at 2 L/min    ABG:   No results found for: PHART, PH, NXV1GIY, PCO2, PO2ART, PO2, HJP4MSP, HCO3, BEART, BE, THGBART, THB, BUQ2XNF, A6NDRDWO, O2SAT     Medications:      pantoprazole  40 mg Intravenous Daily    And    sodium chloride (PF)  10 mL injection 5-40 mL  5-40 mL Intravenous 2 times per day Rosalee Greene MD   10 mL at 07/01/21 0909    sodium chloride flush 0.9 % injection 5-40 mL  5-40 mL Intravenous PRN Rosalee Greene MD        0.9 % sodium chloride infusion  25 mL Intravenous PRN Rosalee Greene MD        ondansetron (ZOFRAN-ODT) disintegrating tablet 4 mg  4 mg Oral Q8H PRN Rosalee Greene MD        Or    ondansetron [facility] injection 4 mg  4 mg Intravenous Q6H PRN Rosalee Greene MD        polyethylene glycol (GLYCOLAX) packet 17 g  17 g Oral Daily PRN Rosalee Greene MD          Prior to Admission Meds:  Prior to Admission medications    Medication Sig Start Date End Date Taking? Authorizing Provider   clopidogrel (PLAVIX) 75 MG tablet Take 75 mg by mouth every other day   Yes Historical Provider, MD   bisacodyl (DULCOLAX) 5 MG EC tablet Take 5 mg by mouth daily as needed for Constipation   Yes Historical Provider, MD   psyllium (KONSYL) 28.3 % PACK Take 1 packet by mouth daily   Yes Historical Provider, MD   escitalopram (LEXAPRO) 5 MG tablet TAKE ONE TABLET BY MOUTH ONCE A DAY 10/5/19  Yes Historical Provider, MD   amLODIPine (NORVASC) 5 MG tablet Take 5 mg by mouth as needed    Yes Historical Provider, MD     Infusions:   IVF:   NaCl 0.9%: at rate 50 ml/h     Nutrition: NPO, risk for aspiration, swallow screen    ATB:  None    Patient currently has   DVT prophylaxis/ GI prophylaxis   PT/OT  Palliative care and hospice care consulted    Labs:     Recent Labs     06/29/21  1839 06/30/21  0857   WBC 9.3 9.4   HGB 16.8* 16.1*   HCT 52.0* 51.1*    242     Recent Labs     06/29/21  1839 06/30/21  0422    139   K 3.3* 3.7    105   CO2 23 26   PHOS  --  3.7   BUN 17 15   CREATININE 0.8 0.8     No results for input(s): AST, ALT, ALB, BILIDIR, BILITOT, ALKPHOS in the last 72 hours.     CBC with Differential:    Lab Results   Component Value Date    WBC 9.4 06/30/2021    RBC 5.99 06/30/2021    HGB 16.1 mental status TECHNOLOGIST PROVIDED HISTORY: Reason for exam:->altered mental status Has a \"code stroke\" or \"stroke alert\" been called? ->Yes Decision Support Exception - unselect if not a suspected or confirmed emergency medical condition->Emergency Medical Condition (MA) What reading provider will be dictating this exam?->CRC FINDINGS: BRAIN/VENTRICLES: No mass effect, edema or hemorrhage is seen. Moderate volume loss is seen in the cerebrum with moderate chronic microvascular ischemic changes. No hydrocephalus or extra-axial fluid is seen. ORBITS: The visualized portion of the orbits demonstrate no acute abnormality. SINUSES: The visualized paranasal sinuses and mastoid air cells demonstrate no acute abnormality. SOFT TISSUES/SKULL:  No acute abnormality of the visualized skull or soft tissues. No acute intracranial abnormality. XR CHEST PORTABLE    Result Date: 2021  EXAMINATION: ONE XRAY VIEW OF THE CHEST 2021 6:22 pm COMPARISON: None. HISTORY: ORDERING SYSTEM PROVIDED HISTORY: altered mental status TECHNOLOGIST PROVIDED HISTORY: Reason for exam:->altered mental status What reading provider will be dictating this exam?->CRC FINDINGS: The lungs are without acute focal process. There is no effusion or pneumothorax. The cardiomediastinal silhouette is without acute process. The osseous structures are without acute process. No acute process. CTA NECK W CONTRAST    Result Date: 2021  Patient MRN:  [id number] : 1926 Age: 80 years Gender: Female Order Date:  2021 6:32 PM EXAM: CTA NECK W CONTRAST, CTA HEAD W CONTRAST NUMBER OF IMAGES:  1 INDICATION:  cva cva Decision Support Exception - unselect if not a suspected or confirmed emergency medical condition->Emergency Medical Condition (MA) What reading provider will be dictating this exam?->[facility] COMPARISON: None Technique: Low-dose CT  acquisition technique included one of following options; 1 .  Automated exposure control, 2. Adjustment of MA and or KV according to patient's size or 3. Use of iterative reconstruction. Contiguous spiral images were obtained in the axial plane, following the administration of intravenous contrast using CT angiographic protocol. Sagittal and coronal images were reconstructed from the axial plane acquisition. Additional MIP reconstructions were presented to aid in the interpretation of this study. Images were obtained from the skull base cranially. There is moderate calcified plaque identified in the vessels compatible with atherosclerotic disease. There is aortic arch and great vessels demonstrate moderate atherosclerotic disease. The right carotid is unremarkable. The left carotid is unremarkable. The right vertebral artery is unremarkable. The left vertebral artery is unremarkable. The basilar artery is unremarkable. The middle cerebral arteries are abnormal there is a right M3 occlusion The anterior cerebral arteries are unremarkable. The posterior cerebral arteries are unremarkable. 1. Estimated stenosis of the proximal right and left internal carotid artery by NASCET criteria is not hemodynamically significant 2. Moderate atherosclerotic disease . 3. Right M3 occlusion This study was analyzed by the [address] ai algorithm. CT BRAIN PERFUSION    Result Date: 2021  Patient MRN: [id number] : 1926 Age:  80 years Gender: Female Order Date: 2021 6:32 PM Exam: CT BRAIN PERFUSION Number of Images: 850 views Indication:   cva cva Decision Support Exception - unselect if not a suspected or confirmed emergency medical condition->Emergency Medical Condition (MA) What reading provider will be dictating this exam?->[facility] Comparison: None. Findings: Perfusion images demonstrate symmetric blood volume Blood flow images demonstrate symmetric blood flow There is a small region of right frontal ischemia. There is no significant core infarct identified.      There is a small region of right frontal ischemia This study was analyzed by the [address] ai algorithm. CTA HEAD W CONTRAST    Result Date: 2021  Patient MRN:  [id number] : 1926 Age: 80 years Gender: Female Order Date:  2021 6:32 PM EXAM: CTA NECK W CONTRAST, CTA HEAD W CONTRAST NUMBER OF IMAGES:  1 INDICATION:  cva cva Decision Support Exception - unselect if not a suspected or confirmed emergency medical condition->Emergency Medical Condition (MA) What reading provider will be dictating this exam?->[facility] COMPARISON: None Technique: Low-dose CT  acquisition technique included one of following options; 1 . Automated exposure control, 2. Adjustment of MA and or KV according to patient's size or 3. Use of iterative reconstruction. Contiguous spiral images were obtained in the axial plane, following the administration of intravenous contrast using CT angiographic protocol. Sagittal and coronal images were reconstructed from the axial plane acquisition. Additional MIP reconstructions were presented to aid in the interpretation of this study. Images were obtained from the skull base cranially. There is moderate calcified plaque identified in the vessels compatible with atherosclerotic disease. There is aortic arch and great vessels demonstrate moderate atherosclerotic disease. The right carotid is unremarkable. The left carotid is unremarkable. The right vertebral artery is unremarkable. The left vertebral artery is unremarkable. The basilar artery is unremarkable. The middle cerebral arteries are abnormal there is a right M3 occlusion The anterior cerebral arteries are unremarkable. The posterior cerebral arteries are unremarkable. 1. Estimated stenosis of the proximal right and left internal carotid artery by NASCET criteria is not hemodynamically significant 2. Moderate atherosclerotic disease . 3. Right M3 occlusion This study was analyzed by the [address] ai algorithm.      MRI BRAIN WO CONTRAST    Result Date: 2021  EXAMINATION: MRI OF replace K/ NPO until eval by speech    Patient's assessment and plan in conjunction with supervising physician - Dr. Vivienne Villalobos    Current Treatment Team:  Treatment Team: Attending Provider: Ashwini Colin MD; Consulting Physician: Mirza Costa MD; Consulting Physician: Ashwini Colin MD; Consulting Physician: Rosalba Carrero MD; Consulting Physician: Loida Matute MA; Registered Nurse: Jose F Liu, RN; Consulting Physician: Josie Michel DO; Stroke Navigator Nurse: Isabel Bingham, PAULINO; Registered Nurse: Klarissa Lyles, APULINO    DIET: Diet NPO   DVT prophylaxis: on SCD  GI prophylaxis: IV Protonix  CODE STATUS: DNR-CC     DASH FrostBC   Critical Care  7/1/2021 6:44 PM      Electronically signed by NASIM Frost CNP on 7/1/2021 at 6:44 PM    Addendum;  I talked to patient's daughter Mrs. Luis Ho extensively. Patient is more alert and awake of the Precedex. Although stays still wished for her to be comfort care but the same token they want to see how she does within the next 24 hours have her speech reevaluated to see whether she can take oral intake or not. Also they would like patient to be evaluated PT OT for possible consideration for rehab. I do believe the request is reasonable. We will put in speech therapy. Consult PT OT. We will continue IV fluids for now. Patient is stable to be transferred to a general floor. I personally saw, examined and provided care for the patient. Radiographs, labs and medication list were reviewed by me independently. I spoke with bedside nursing, therapists and consultants. Critical care services and times documented are independent of procedures and multidisciplinary rounds with [facility]. Additionally comprehensive, multidisciplinary rounds were conducted with the MICU team. The case was discussed in detail and plans for care were established. Review of CNP documentation was conducted and revisions were made as appropriate.  I agree

## 2021-07-01 NOTE — PROGRESS NOTES
Reached out to April, Dr. Andres Rodriguez NP, via perfect serve, in regards to family wanting to change pt code status to a CC. She stated she would touch base with Dr. Rabia Buck and follow up shortly. Family remains at bedside, and a bereavement tray was offered.

## 2021-07-01 NOTE — PROGRESS NOTES
Subjective:  Not arousable  Objective:    BP (!) 153/47   Pulse 57   Temp 97.3 °F (36.3 °C) (Temporal)   Resp 18   Ht 5' 1\" (1.549 m)   Wt 140 lb (63.5 kg)   SpO2 94%   BMI 26.45 kg/m²     In: 421 [I.V.:421]  Out: -   In: 421   Out: -     General appearance: Lethargic, not arousable  HEENT: AT/NC, MMM  Neck: FROM, supple  Lungs: Clear to auscultation  CV: RRR, no MRGs  Vasc: Radial pulses 2+  Abdomen: Soft, non-tender; no masses or HSM  Extremities: No peripheral edema or digital cyanosis  Skin: no rash, lesions or ulcers  Psych not arousable     Recent Labs     06/29/21 1839 06/30/21  0857   WBC 9.3 9.4   HGB 16.8* 16.1*   HCT 52.0* 51.1*    242       Recent Labs     06/29/21 1839 06/30/21  0422    139   K 3.3* 3.7    105   CO2 23 26   BUN 17 15   CREATININE 0.8 0.8   CALCIUM 9.9 9.8       Assessment:    Active Problems:    Cerebrovascular accident (CVA) ([address]  Resolved Problems:    * No resolved hospital problems.  *      Plan:  Admit to medical intensive care unit for observation post IV TPA for acute ischemic stroke  Above course complicated by hemorrhagic conversion bilateral hemispheres larger on the left than right  Patient not able to be aroused on my evaluation  Neurology following  ICU team following  Await progression  CODE STATUS is now DNR comfort care only  Transfer out of ICU to general medical floor with palliative medications as needed  Stop checking labs and vitals    DVT Prophylaxis   PT/OT  Discharge planning           Mara Womack MD  10:30 AM  7/1/2021

## 2021-07-01 NOTE — PLAN OF CARE
Problem: Skin Integrity:  Goal: Will show no infection signs and symptoms  7/1/2021 0326 by Ghulam Arambula RN  Outcome: Met This Shift  7/1/2021 0007 by Ghulam Arambula RN  Outcome: Met This Shift  Goal: Absence of new skin breakdown  7/1/2021 0326 by Ghulam Arambula RN  Outcome: Met This Shift  7/1/2021 0007 by Ghulam Arambula RN  Outcome: Met This Shift     Problem: Falls - Risk of:  Goal: Will remain free from falls  7/1/2021 0326 by Ghulam Arambula RN  Outcome: Met This Shift  7/1/2021 0007 by Ghulam Arambula RN  Outcome: Met This Shift  Goal: Absence of physical injury  7/1/2021 0326 by Ghulam Arambula RN  Outcome: Met This Shift  7/1/2021 0007 by Ghulam Arambula RN  Outcome: Met This Shift     Problem: Non-Violent Restraints  Goal: Removal from restraints as soon as assessed to be safe  Outcome: Completed  Goal: No harm/injury to patient while restraints in use  Outcome: Completed  Goal: Patient's dignity will be maintained  Outcome: Completed

## 2021-07-01 NOTE — CARE COORDINATION
7/1 Care Coordination: Pt now DNR-CC, No hospice at this time. Pt  With little improvement in Mental status. johny wants to keep comfortable, wants PT/OT/Speech to see. They have lists for [facility] and are visiting today. CM/SW will continue to follow for discharge planning.    Radha MASTERSN,RN--BC  [phone]

## 2021-07-01 NOTE — PROGRESS NOTES
Gareth Long is a 80 y.o. right handed female     Patient presented with sudden altered mental status    After a physician appointment. She was combative in the emergency department requiring Ativan and ketamine. CTA showed an M3 stenosis and right peripheral temporal perfusion deficit she was given IV TPA concerning for stroke and and became increasingly more lethargic. Yesterday MRI of her brain was obtained which did demonstrate bilateral cerebral hemorrhagic foci. Last evening the chart reflects patient was made a DNR CC  No family present this      Prior to Visit Medications    Medication Sig Taking?  Authorizing Provider   clopidogrel (PLAVIX) 75 MG tablet Take 75 mg by mouth every other day Yes Historical Provider, MD   bisacodyl (DULCOLAX) 5 MG EC tablet Take 5 mg by mouth daily as needed for Constipation Yes Historical Provider, MD   psyllium (KONSYL) 28.3 % PACK Take 1 packet by mouth daily Yes Historical Provider, MD   escitalopram (LEXAPRO) 5 MG tablet TAKE ONE TABLET BY MOUTH ONCE A DAY Yes Historical Provider, MD   amLODIPine (NORVASC) 5 MG tablet Take 5 mg by mouth as needed  Yes Historical Provider, MD       Allergies as of 06/29/2021 - Fully Reviewed 05/30/2021   Allergen Reaction Noted    Ciprofloxacin  06/06/2015    Lisinopril  06/06/2015    Statins  11/05/2019       Objective:     BP (!) 171/72   Pulse 58   Temp 97.3 °F (36.3 °C) (Temporal)   Resp 12   Ht 5' 1\" (1.549 m)   Wt 140 lb (63.5 kg)   SpO2 98%   BMI 26.45 kg/m²      General appearance: resting comfortably in bed  Head: Normocephalic, without obvious abnormality, atraumatic  Extremities: no cyanosis or edema  Pulses: 2+ and symmetric  Skin: no rashes or lesions    Mental Status: does not open eyes    Not following commands     Cranial Nerves:  I: smell    II: visual acuity     II: visual fields    II: pupils DION   III,VII: ptosis    III,IV,VI: extraocular muscles     V: mastication    V: facial light touch sensation Grimaces to noxious stim    V,VII: corneal reflex  Present   VII: facial muscle function - upper     VII: facial muscle function - lower    VIII: hearing Normal   IX: soft palate elevation     IX,X: gag reflex    XI: trapezius strength     XI: sternocleidomastoid strength    XI: neck extension strength     XII: tongue strength       No random movements lower extremities  No grimacing and no withdrawal to noxious stimulation     DTR:   No reflexes    right Babinski  No Harden's     Laboratory/Radiology:     CBC with Differential:    Lab Results   Component Value Date    WBC 9.4 06/30/2021    RBC 5.99 06/30/2021    HGB 16.1 06/30/2021    HCT 51.1 06/30/2021     06/30/2021    MCV 85.3 06/30/2021    MCH 26.9 06/30/2021    MCHC 31.5 06/30/2021    RDW 13.8 06/30/2021    LYMPHOPCT 17.5 06/30/2021    MONOPCT 9.8 06/30/2021    BASOPCT 0.2 06/30/2021    MONOSABS 0.92 06/30/2021    LYMPHSABS 1.65 06/30/2021    EOSABS 0.14 06/30/2021    BASOSABS 0.02 06/30/2021     CMP:    Lab Results   Component Value Date     06/30/2021    K 3.7 06/30/2021    K 3.3 06/29/2021     06/30/2021    CO2 26 06/30/2021    BUN 15 06/30/2021    CREATININE 0.8 06/30/2021    GFRAA >60 06/30/2021    LABGLOM >60 06/30/2021    GLUCOSE 143 06/30/2021    PROT 6.8 05/30/2021    LABALBU 3.7 05/30/2021    CALCIUM 9.8 06/30/2021    BILITOT 0.5 05/30/2021    ALKPHOS 130 05/30/2021    AST 20 05/30/2021    ALT 11 05/30/2021     HgBA1c:    Lab Results   Component Value Date    LABA1C 5.1 06/30/2021     FLP:    Lab Results   Component Value Date    TRIG 127 06/30/2021    HDL 29 06/30/2021    LDLCALC 73 06/30/2021    LABVLDL 25 06/30/2021       MRI Brain:  1. Bilateral cerebral hemorrhagic foci, more numerous on the left.  The  largest measures 3.0 x 2.4 cm in the left parietal lobe. 2. Subarachnoid blood is also seen in the cerebral hemispheres bilaterally,  most notably along the posterior aspect of the left cerebral hemisphere.   3. Probable 3 mm focus of acute or early subacute infarction in the right  cerebellar hemisphere. 4. The etiology of the hemorrhages is unclear and may be due to  anticoagulation. I personally reviewed the patient's lab and imaging studies at this time. Assessment:     Patient presented to the hospital with strokelike signs and symptoms and was given IV TPA. Imaging now consistent with bilateral cerebral intracranial hemorrhage. .    Patient has a history of cognitive difficulties at baseline.   Hypertension and previous TIAs   She was only able to take Plavix every other day due to her significant bruising    Plan:     Patient is now DNR CC  We will follow peripherally    NASIM Ramirez  9:35 AM  7/1/2021

## 2021-07-01 NOTE — PROGRESS NOTES
[facility] OF THE [place]     Liaison Information Visit Note              Patient Name: Eliza Dill   :  1926  MRN:  [id number]  Admit date:  2021   Hospital Admitting Physician:  No admitting provider for patient encounter. PCP:  Quentin Montana MD  Primary Insurance: Payor: MEDICARE /  /  /    Emergency Contact:      Contact/Relation:   /         Phone:     Advance Directive  Patient has NOT completed an advance directive   Discussed with: Family member  DPOA-HC Name-Relation:Healthcare Agent's Name: Tio Barnes Agent's Phone Number: [phone]    Terminal Diagnosis cva with cognitive decline     Current Hospital Problem List:   Patient Active Problem List   Diagnosis Code    Expressive aphasia R47.01    Bilateral carotid artery stenosis I65.23    Cerebrovascular accident (CVA) ([facility] [address] I63.9       Code Status Order: DNR-CC     Allergies:  Ciprofloxacin, Lisinopril, and Statins    Family Goal: comfort    Meeting held with daughter Luc Harrington and son Miguel Wang    The hospice benefit and philosophy were explained including that hospice is end of life care in which, per Medicare, a patient has a terminal diagnosis that life expectancy would be 6 months or less. Hospice care is a service that is covered by most insurance plans. The following levels of hospice care were discussed including, routine level of hospice care at private home or facility, which patient/family is responsible for any room and board fees at the facility, and general in patient level of care (GIP) at the [facility] for short term symptom management. Per Medicare guidelines, a patient is considered appropriate for GIP if they have uncontrolled symptoms such as pain, agitation, labored breathing or nausea/vomiting. Once symptoms become managed, the patient would need to be moved to a lower level of care such as home with hospice, or ECF with hospice.  Family informed that with the routine level of care at home or ECF,  the hospice team consists of the RN who visits 1-3 times a week, a  who visits within the first five days of the hospice election, the personal care team who visit 1-3 times a week, non-medical volunteers and Chaplains. Explained that at home in routine level of care, familles are responsible for the 24 hour care. Discussed that under hospice care patient would not receive chemotherapy, radiation, immune therapy, IV antibiotics, dialysis or blood transfusions. Explained that once in hospice care, all aggressive treatments would be stopped and allow nature to takes its course with focus on comfort care for the patient. Family would like to transition to hospice care at an ECF. Alejandro Daniels states that she had spoke with [facility] a few days ago about patient going there but this why prior to the patient having a stroke. Alejandro Daniels will reach out to [facility] to see if they will take patient now under hospice care. Loissierra Daniels and Slim Stephens are going to go make visits to a few other facilities to get other choices in case [facility] cannot take patient. They will update CM with choices. I updated Vick Cohen. Will continue to follow and await ecf placement. Discharge Plan:  Discharge Disposition; ECF    [facility] plan:  1. Awaiting ECF acceptance with [facility]. 2. Please call [facility] [phone] with any questions. 3. Patient not currently under the care of hospice.     Electronically signed by Kaitlin Baires RN on 7/1/2021 at 12:23 PM

## 2021-07-01 NOTE — CONSULTS
Palliative Care Department  [phone]  Palliative Care Initial Consult  Provider NASIM Segovia CNP     Adelfo Blanco  [id number]  Hospital Day: 3  Date of Initial Consult: 7/1/2021  Referring Provider: NASIM Juarez CNP  Palliative Medicine was consulted for assistance with: Goals of Care    HPI:   Adelfo Blanco is a 80 y. o. with a medical history of HTN who was admitted on 6/29/2021 from home with a CHIEF COMPLAINT of altered mental status. CT head showed right M3 occlusion, stenosis of the proximal right and left internal carotid artery, moderate atherosclerotic disease. Patient was given IV TPA and transferred to ICU for further management. . Palliative medicine consulted for goals of care. ASSESSMENT/PLAN:     Pertinent Hospital Diagnoses      Acute ischemic stroke      Palliative Care Encounter / Counseling Regarding Goals of Care  Please see detailed goals of care discussion as below   At this time, Adelfo Blanco, Does Not have capacity for medical decision-making. Capacity is time limited and situation/question specific   During encounter, Gildardo Burt, daughter, was surrogate medical decision-maker   Outcome of goals of care meeting: Continue medical treatment, reevaluate patient in 24 hours for decision regarding hospice   Code status DNR-CC   Advanced Directives: no POA or living will in epic    Surrogate/Legal NOK:  o Mireya Ulloa, daughter, [phone]  o Jose Peralta, son-in-law, [phone]  Earl Walton, child, [phone]        Spiritual assessment: no spiritual distress identified  Bereavement and grief: to be determined  Referrals to: none today   SUBJECTIVE:       Details of Conversation: Chart reviewed and patient seen. Patient resting in bed. Patient's daughter, Gildardo Burt, at beside. Gildardo Burt states that patient was just talking to her and she feels that patient is already making improvement.  Gildardo Burt explains that she feels decision for hospice needs to be delayed and she would like to have patient be evaluated over the next 24 hours to watch for improvement. Continue DNR CC at this time. Continue medical management only. Santi Clark states, \"no heroics\". Support provided       OBJECTIVE:   Prognosis: depends upon goals and unknown    Physical Exam:  BP (!) 171/72   Pulse 58   Temp 97.3 °F (36.3 °C) (Temporal)   Resp 12   Ht 5' 1\" (1.549 m)   Wt 140 lb (63.5 kg)   SpO2 98%   BMI 26.45 kg/m²   Constitutional:  Elderly, thin, NAD  ENMT:  Normocephalic, atraumatic, mucosa moist, EOMI  Lungs:  Nasal cannula, easy and unlabored respirations  Heart:  RRR  Abd:  Soft, non tender, non distended  Ext: No edema, pulses present  Skin:  Warm and dry  Neuro:  Awake, responsive to pain    Objective data reviewed: labs, images, records, medication use, vitals and chart    Discussed patient and the plan of care with the other IDT members: Palliative Medicine IDT Team    Time/Communication  Greater than 50% of time spent, total 50 minutes in counseling and coordination of care at the bedside regarding goals of care, diagnosis and prognosis and see above. Thank you for allowing Palliative Medicine to participate in the care of Greg Mcclendon.

## 2021-07-02 LAB
ANION GAP SERPL CALCULATED.3IONS-SCNC: 15 MMOL/L (ref 7–16)
BASOPHILS ABSOLUTE: 0.04 E9/L (ref 0–0.2)
BASOPHILS RELATIVE PERCENT: 0.3 % (ref 0–2)
BUN BLDV-MCNC: 17 MG/DL (ref 6–23)
CALCIUM SERPL-MCNC: 10.2 MG/DL (ref 8.6–10.2)
CHLORIDE BLD-SCNC: 110 MMOL/L (ref 98–107)
CO2: 19 MMOL/L (ref 22–29)
CREAT SERPL-MCNC: 0.7 MG/DL (ref 0.5–1)
EOSINOPHILS ABSOLUTE: 0.03 E9/L (ref 0.05–0.5)
EOSINOPHILS RELATIVE PERCENT: 0.2 % (ref 0–6)
GFR AFRICAN AMERICAN: >60
GFR NON-AFRICAN AMERICAN: >60 ML/MIN/1.73
GLUCOSE BLD-MCNC: 106 MG/DL (ref 74–99)
HCT VFR BLD CALC: 52.3 % (ref 34–48)
HEMOGLOBIN: 16.6 G/DL (ref 11.5–15.5)
IMMATURE GRANULOCYTES #: 0.05 E9/L
IMMATURE GRANULOCYTES %: 0.4 % (ref 0–5)
LYMPHOCYTES ABSOLUTE: 1.2 E9/L (ref 1.5–4)
LYMPHOCYTES RELATIVE PERCENT: 9.7 % (ref 20–42)
MCH RBC QN AUTO: 27.3 PG (ref 26–35)
MCHC RBC AUTO-ENTMCNC: 31.7 % (ref 32–34.5)
MCV RBC AUTO: 86.2 FL (ref 80–99.9)
MONOCYTES ABSOLUTE: 1.03 E9/L (ref 0.1–0.95)
MONOCYTES RELATIVE PERCENT: 8.3 % (ref 2–12)
NEUTROPHILS ABSOLUTE: 10.01 E9/L (ref 1.8–7.3)
NEUTROPHILS RELATIVE PERCENT: 81.1 % (ref 43–80)
PDW BLD-RTO: 14 FL (ref 11.5–15)
PLATELET # BLD: 234 E9/L (ref 130–450)
PMV BLD AUTO: 10.4 FL (ref 7–12)
POTASSIUM REFLEX MAGNESIUM: 3.7 MMOL/L (ref 3.5–5)
RBC # BLD: 6.07 E12/L (ref 3.5–5.5)
SODIUM BLD-SCNC: 144 MMOL/L (ref 132–146)
WBC # BLD: 12.4 E9/L (ref 4.5–11.5)

## 2021-07-02 PROCEDURE — 97166 OT EVAL MOD COMPLEX 45 MIN: CPT

## 2021-07-02 PROCEDURE — 92523 SPEECH SOUND LANG COMPREHEN: CPT | Performed by: SPEECH-LANGUAGE PATHOLOGIST

## 2021-07-02 PROCEDURE — 6360000002 HC RX W HCPCS: Performed by: NURSE PRACTITIONER

## 2021-07-02 PROCEDURE — 2580000003 HC RX 258: Performed by: INTERNAL MEDICINE

## 2021-07-02 PROCEDURE — 97530 THERAPEUTIC ACTIVITIES: CPT

## 2021-07-02 PROCEDURE — C9113 INJ PANTOPRAZOLE SODIUM, VIA: HCPCS | Performed by: NURSE PRACTITIONER

## 2021-07-02 PROCEDURE — 92507 TX SP LANG VOICE COMM INDIV: CPT | Performed by: SPEECH-LANGUAGE PATHOLOGIST

## 2021-07-02 PROCEDURE — 1200000000 HC SEMI PRIVATE

## 2021-07-02 PROCEDURE — 85025 COMPLETE CBC W/AUTO DIFF WBC: CPT

## 2021-07-02 PROCEDURE — 92610 EVALUATE SWALLOWING FUNCTION: CPT | Performed by: SPEECH-LANGUAGE PATHOLOGIST

## 2021-07-02 PROCEDURE — 80048 BASIC METABOLIC PNL TOTAL CA: CPT

## 2021-07-02 PROCEDURE — 97161 PT EVAL LOW COMPLEX 20 MIN: CPT

## 2021-07-02 PROCEDURE — 36415 COLL VENOUS BLD VENIPUNCTURE: CPT

## 2021-07-02 RX ADMIN — SODIUM CHLORIDE, PRESERVATIVE FREE 10 ML: 5 INJECTION INTRAVENOUS at 07:48

## 2021-07-02 RX ADMIN — HYDRALAZINE HYDROCHLORIDE 10 MG: 20 INJECTION INTRAMUSCULAR; INTRAVENOUS at 04:02

## 2021-07-02 RX ADMIN — PANTOPRAZOLE SODIUM 40 MG: 40 INJECTION, POWDER, FOR SOLUTION INTRAVENOUS at 07:47

## 2021-07-02 ASSESSMENT — PAIN SCALES - PAIN ASSESSMENT IN ADVANCED DEMENTIA (PAINAD)
CONSOLABILITY: 0
CONSOLABILITY: 0
FACIALEXPRESSION: 0
BREATHING: 0
TOTALSCORE: 0
BODYLANGUAGE: 0
NEGVOCALIZATION: 0
BREATHING: 0
CONSOLABILITY: 0
BODYLANGUAGE: 0
FACIALEXPRESSION: 0
BODYLANGUAGE: 0
TOTALSCORE: 0
FACIALEXPRESSION: 0
NEGVOCALIZATION: 0
BREATHING: 0

## 2021-07-02 NOTE — PROGRESS NOTES
Pt fed self with little assistance of nurse . Appetite good, no difficulty swallowing liquids or pureed foods.

## 2021-07-02 NOTE — PROGRESS NOTES
nutrition/hydration via PO intake of the least restrictive oral diet with implementation of safe swallow/ compensatory strategies and decrease signs/symptoms of aspiration to less than 1 x/day. Patient/family Goal:    Did not state. Will further assess during treatment. Plan of care discussed with Patient however patient is not appropriate for review of evaluation, Family not present. Nursing was made aware of the results of evaluation and recommended diet. Rehabilitation Potential/Prognosis: good                    ADMITTING DIAGNOSIS: Stroke aborted by administration of thrombolytic agent ([facility] [address] [I63.9]    VISIT DIAGNOSIS:       PATIENT REPORT/COMPLAINT: None    RN cleared patient for participation in assessment     yes     PRIOR LEVEL OF SWALLOW FUNCTION:    PAST HISTORY OF DYSPHAGIA?: none reported    Diet during hospital admission: NPO     PROCEDURE:  Consistencies Administered During the Evaluation   Liquids: thin liquid   Solids:  pureed foods      Method of Intake:   cup, spoon  Fed by clinician      Position:   Seated, upright    CLINICAL ASSESSMENT:  Oral Stage: The oral stage of swallowing was within functional limits for level of cognition/fatigue. Oral residuals were noted throughout oral cavity due to previous residuals located on roof of mouth. Patient was orally swabbed which resolved most residuals that were present. Pharyngeal Stage:    No signs of aspiration were noted during this evaluation however, silent aspiration cannot be ruled out at bedside. If silent aspiration is suspected, a Videofluoroscopic Study of Swallowing (MBS) is recommended and requires a physician order. Cognition:   Alert & Oriented x 1, Patient knew she was in the hospital, Max cueing was needed for name of hospital. Patient unable to produce name. Patient was able to follow one step directions.      Oral Peripheral Examination   Generalized oral weakness    Current Respiratory Status    room air     Parameters of Speech Production  Respiration:  Adequate for speech production  Quality:   Within functional limits  Intensity: Within functional limits    Volitional Swallow: present     Volitional Cough:   Did not assess     Pain: No pain reported. EDUCATION:   The Speech Language Pathologist (SLP) completed education regarding results of evaluation and that intervention is warranted at this time. Learner: Nursing  Education: Reviewed results and recommendations of this evaluation   Evaluation of Education:  Patient is not appropriate to educate on the results of the evaluation at this time due, family not present. Nursing was made aware. This plan may be re-evaluated and revised as warranted. Evaluation Time includes thorough review of current medical information, gathering information on past medical history/social history and prior level of function, completion of standardized testing/informal observation of tasks, assessment of data and education on plan of care and goals. [x]The admitting diagnosis and active problem list, have been reviewed prior to initiation of this evaluation.         ACTIVE PROBLEM LIST:   Patient Active Problem List   Diagnosis    Expressive aphasia    Bilateral carotid artery stenosis    Cerebrovascular accident (CVA) [facility])         CPT code:  55166  bedside swallow jadiel Foster  SLP Student Intern

## 2021-07-02 NOTE — PROGRESS NOTES
Subjective:  Awake and answering  questions   Objective:    BP (!) 148/65   Pulse 96   Temp 98.2 °F (36.8 °C) (Temporal)   Resp 16   Ht 5' 1\" (1.549 m)   Wt 140 lb (63.5 kg)   SpO2 96%   BMI 26.45 kg/m²     No intake/output data recorded. No intake/output data recorded. General appearance: Lethargic, not arousable  HEENT: AT/NC, MMM  Neck: FROM, supple  Lungs: Clear to auscultation  CV: RRR, no MRGs  Vasc: Radial pulses 2+  Abdomen: Soft, non-tender; no masses or HSM  Extremities: No peripheral edema or digital cyanosis  Skin: no rash, lesions or ulcers  Psych not arousable     Recent Labs     06/29/21  1839 06/30/21  0857 07/02/21  0625   WBC 9.3 9.4 12.4*   HGB 16.8* 16.1* 16.6*   HCT 52.0* 51.1* 52.3*    242 234       Recent Labs     06/29/21  1839 06/30/21  0422 07/02/21  0625    139 144   K 3.3* 3.7 3.7    105 110*   CO2 23 26 19*   BUN 17 15 17   CREATININE 0.8 0.8 0.7   CALCIUM 9.9 9.8 10.2       Assessment:    Principal Problem:    Cerebrovascular accident (CVA) ([facility] [address]  Resolved Problems:    * No resolved hospital problems.  *      Plan:  Admit to medical intensive care unit for observation post IV TPA for acute ischemic stroke  Above course complicated by hemorrhagic conversion bilateral hemispheres larger on the left than right  [place] and responsive   Family wants to change back to [facility]   She can be discharged to [facility] don what PT findings are     Await progression  CODE STATUS is now DNR comfort care Arrest           DVT Prophylaxis   PT/OT  Discharge Torsten Reynoso MD  11:08 AM  7/2/2021

## 2021-07-02 NOTE — PROGRESS NOTES
Chart reviewed and patient seen. Patient resting in bed, son at bedside. Son states family has decided to continue with medical management and for patient to go to skilled facility. Goals of care and CODE STATUS have been established. Palliative medicine to sign off.

## 2021-07-02 NOTE — PLAN OF CARE
Problem: Skin Integrity:  Goal: Will show no infection signs and symptoms  Description: Will show no infection signs and symptoms  7/2/2021 0046 by Helen Yan RN  Outcome: Met This Shift  7/1/2021 1212 by Nona Roberts RN  Outcome: Met This Shift  Goal: Absence of new skin breakdown  Description: Absence of new skin breakdown  7/2/2021 0046 by Helen Yan RN  Outcome: Met This Shift  7/1/2021 1212 by Nona Roberts RN  Outcome: Met This Shift     Problem: Falls - Risk of:  Goal: Will remain free from falls  Description: Will remain free from falls  7/1/2021 1212 by Nona Roberts RN  Outcome: Met This Shift  Goal: Absence of physical injury  Description: Absence of physical injury  7/2/2021 0046 by Helen Yan RN  Outcome: Met This Shift  7/1/2021 1212 by Nona Roberts RN  Outcome: Met This Shift

## 2021-07-02 NOTE — PLAN OF CARE
Problem: Skin Integrity:  Goal: Will show no infection signs and symptoms  Description: Will show no infection signs and symptoms  7/2/2021 1206 by Yovany Cain RN  Outcome: Met This Shift  7/2/2021 0046 by Diamante Neil RN  Outcome: Met This Shift  Goal: Absence of new skin breakdown  Description: Absence of new skin breakdown  7/2/2021 1206 by Yovany Cain RN  Outcome: Met This Shift  7/2/2021 0046 by Diamante Neil RN  Outcome: Met This Shift     Problem: Falls - Risk of:  Goal: Will remain free from falls  Description: Will remain free from falls  Outcome: Met This Shift  Goal: Absence of physical injury  Description: Absence of physical injury  7/2/2021 1206 by Yovany Cain RN  Outcome: Met This Shift  7/2/2021 0046 by Diamante Neil RN  Outcome: Met This Shift

## 2021-07-02 NOTE — PROGRESS NOTES
SPEECH/LANGUAGE PATHOLOGY  SPEECH/LANGUAGE/COGNITIVE EVALUATION   and PLAN OF CARE      PATIENT NAME:  Eliza Dill  (female)     MRN:  [id number]    :  1926  (80 y.o.)  STATUS:  Inpatient: Room 5217/5217-B    TODAY'S DATE:  2021  REFERRING PROVIDER:    ARSLAN Senior   SPECIFIC PROVIDER ORDER: SLP eval and treat  Date of order:  2021  REASON FOR REFERRAL:  TPA  EVALUATING THERAPIST: Orion Smith    ADMITTING DIAGNOSIS: Stroke aborted by administration of thrombolytic agent [facility]) [I63.9]    VISIT DIAGNOSIS:      SPEECH THERAPY  PLAN OF CARE   The speech therapy  POC is established based on physician order, speech pathology diagnosis and results of clinical assessment     SPEECH PATHOLOGY DIAGNOSIS:    Severe Expressive Aphasia and Moderate Receptive Aphasia      Speech Pathology intervention is recommended 3-6 times per week for LOS or when goals are met with emphasis on the following:      Conditions Requiring Skilled Therapeutic Intervention for speech, language and/or cognition    Expressive Aphasia   Receptive Aphasia  Cognitive linguistic impairment to be further assessed as able. Specific Speech Therapy Interventions to Include:   Confrontational Naming task training   Expressive language training   Therapeutic tasks for Cognition    Specific instructions for next treatment: To initiate therapeutic exercises  To initiate language tasks  To initiate memory tasks  To initiate thought organization tasks    SHORT/LONG TERM GOALS  Pt will improve expressive language skills to improve accuracy of completion of automatic speech tasks, object/picture naming, phrase completion, and phrasal expression of basic wants and needs with 80% accuracy  Pt will improve receptive language skills to improve accuracy of simple [facility] questions with 80% accuracy.     Patient goals: Patient/family involved in developing goals and treatment plan:   Treatment goals will be discussed with Family    The Patient did not demonstrate complete understanding of the diagnosis, prognosis and plan of care   The patient/family Could not state    This plan may be re-evaluated and revised as warranted. Rehabilitation Potential/Prognosis: fair               CLINICAL ASSESSMENT:  MOTOR SPEECH       Oral Peripheral Examination   Generalized oral weakness    Parameters of Speech Production  Respiration:  Adequate for speech production  Articulation:  Distortion  Resonance:  Within functional limits  Quality:   Within functional limits  Pitch: Within functional limits  Intensity: Within functional limits  Fluency:  Intact  Prosody Irregular/Impaired    RECEPTIVE LANGUAGE    Comprehension of Yes/No Questions:   Impaired, Max cueing was required for responses    Process  Simple Verbal Commands: (1 step direction)  Within functional limits and partially latent  Process Intermediate Verbal Commands:   Impaired  Process Complex Verbal Commands:     Impaired    Comprehension of Conversation:      Latent and Impaired, Max cueing was required for redirection to topic      EXPRESSIVE LANGUAGE     Serials: To be assessed    Imitation:  Words   Impaired   Sentences Impaired    Naming:  (Modality used:  Verbal)  Confrontation Naming  Impaired  Functional Description  To be assessed  Response Naming: Impaired    Conversation:      Confusion was noted during conversation and Literal paraphasic errors were noted    COGNITION     Attention/Orientation  Attention: Easily Distracted  Orientation:  Oriented to Place with mod-max verbal cueing    Memory   Immediate Recall: Did not assess    Delayed Recall:   Did not assess    Long Term Recall:   Did not assess, Patient did however inform staff that 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 Accuracy of statement is unknown.      Organization/Problem Solving/Reasoning   Verbal Sequencing:   Not assessed        Verbal Problem solving:  Not assessed          CLINICAL OBSERVATIONS NOTED DURING THE EVALUATION  Latent responses, Perseveration, Inconsistent responses, Paraphasic errors and Cueing was required. Patient required repetition of prompted questions. Patient seemed impulsive with actions. EDUCATION:   The Speech Language Pathologist (SLP) completed education regarding results of evaluation and that intervention is warranted at this time. Learner: Nursing  Education: Reviewed results and recommendations of this evaluation  Evaluation of Education:  Patient is not appropriate to educate on the results of the evaluation at this time due, family not present. Nursing was made aware. Patient required maximum verbal cueing to answer simple questions appropriately. Patient did demonstrate echolalia, paraphasic errors, and the need for repetition. Patient was redirected to current location, however Pt was unable to produce her name. Pt able to complete the phrase \"My name is\" however, following the phrase was unintelligible/incorrect. Patient was unable to recall name of common item (sock) with maximum cueing of placement of item (foot). Evaluation Time includes thorough review of current medical information, gathering information on past medical history/social history and prior level of function, completion of standardized testing/informal observation of tasks, assessment of data and education on plan of care and goals. CPT code:    07033  eval speech sound lang comprehension / 94688 speech and language therapy      The admitting diagnosis and active problem list, as listed below have been reviewed prior to initiation of this evaluation.         ACTIVE PROBLEM LIST:   Patient Active Problem List   Diagnosis    Expressive aphasia    Bilateral carotid artery stenosis    Cerebrovascular accident (CVA) ([facility]     Orion Smith  SLP Student Intern    Edin Hernandez M.S., [address] Pathologist  [id number]  7/2/2021

## 2021-07-02 NOTE — PROGRESS NOTES
[address]  [address] [place] [address]      Date:2021                 Patient Name: Kelvin Page  MRN: [id number]  : 1926  Room: [address] [place] [address]    Referring Sweta Agustin MD  Specific Provider Orders/Date: OT evaluation and treat 21    Evaluating OT: Bk Casiano OTR/L #[id number]    Diagnosis: stroke TPA     Surgery:   Past Surgical History:   Procedure Laterality Date    APPENDECTOMY      BLADDER REPAIR      CATARACT REMOVAL      HYSTERECTOMY      JOINT REPLACEMENT            Pertinent Medical History: HTN, dementia      Precautions:  Fall Risk, safety, expressive aphasia    Assessment of current deficits [x] Functional mobility  [x]ADLs  [x] Strength               [x]Cognition   [x] Functional transfers   [x] IADLs         [x] Safety Awareness   [x]Endurance   [] Fine Coordination              [x] Balance      [] Vision/perception   []Sensation    [x]Gross Motor Coordination  [] ROM  [] Delirium                   [] Motor Control     OT PLAN OF CARE   OT POC based on physician orders, patient diagnosis and results of clinical assessment    Frequency/Duration   2-4 days/wk for 1 week PRN   Specific OT Treatment Interventions to include:   * Instruction/training on adapted ADL techniques and AE recommendations to increase functional independence within precautions       * Training on energy conservation strategies, correct breathing pattern and techniques to improve independence/tolerance for self-care routine  * Functional transfer/mobility training/DME recommendations for increased independence, safety, and fall prevention  * Patient/Family education to increase follow through with safety techniques and functional independence  * Recommendation of environmental modifications for increased safety with functional transfers/mobility and ADLs  * Cognitive retraining/development of therapeutic activities to improve problem solving, judgement, memory, and attention for increased safety/participation in ADL/IADL tasks  * Therapeutic exercise to improve motor endurance, ROM, and functional strength for ADLs/functional transfers  * Therapeutic activities to facilitate/challenge dynamic balance, stand tolerance for increased safety and independence with ADLs  * Therapeutic activities to facilitate gross/fine motor skills for increased independence with ADLs  * Neuro-muscular re-education: facilitation of righting/equilibrium reactions, midline orientation, scapular stability/mobility, normalization of muscle tone, and facilitation of volitional active controled movement    OTMRS Modified Rosaura Scale (MRS)  Score     Description  0             No symptoms  1             No significant disability despite symptoms  2             Slight disability; able to look after own affairs  3             Moderate disability; able to ambulate without assist/ requires assist with ADLs  4             Moderate/Severe disability;requires assist to ambulate/assist with ADLs  5             Severe disability;bedridden/incontinent   6               Score:   4    Recommended Adaptive Equipment: TBD     Home Living: Pt lives with daughter and ARLET per chart- unable to provide PLOF due to aphasia     Prior Level of Function: unclear level of assist with ADLs , assisted with IADLs; ambulated ww  Driving: no  Occupation: retired RN  Medication management: assist  Leisure: unable to state    Pain Level: noted increased pain with sitting- could not tell what the cause- skin looked clear   Cognition: A&O: 2/4; able to state hospital and name inconsistently- appears to understand and follow basic wrote commands and form responses with intermittent garbled unintelligible speechFollows one step directions   Memory: poor   Sequencing:  Fair-   Problem solving:  poor   Judgement/safety:  poor     Functional Assessment: AM-PAC Daily Activity Raw Score: 15/24   Initial Eval Status  Date: 7/2/21 Treatment Status  Date: STGs = LTGs  Time frame: 2-4 days   Feeding Min A per speech thin liquids and pureed diet. Able to hold cup and needed assist to grade amount of sip. supervision   Grooming Min A sitting EOB to wash hands and face  supervision   UB Dressing Min A   supervision   LB Dressing Mod A able to reach feet and pulled up B socks  SBA   Bathing n/t  Min A seated    Toileting Mod A   Min A    Bed Mobility  Supine to sit: min A   Sit to supine:  Min A   Supine to sit: supervision   Sit to supine: supervisino   Functional Transfers Min A with ww  SBA   Functional Mobility Min A steps to [facility] with increased cues   SBA with ww   Balance Sitting:     Static:  SBA    Dynamic:CGA  Standing: min A                                                                        Activity Tolerance Fair limited by pain in behind and cognition  Fair+   Visual/  Perceptual Glasses: unknown appeared to have neglect of L side unclear if vision affected         Safety poor                                  fair     Hand Dominance ??   AROM (PROM) Strength Additional Info:    RUE  WFL 4-/5 N/t appeared WFL     LUE WFL with cues to move initially 4-/5 N/t appeared WFL     Hearing: [place]  Sensation:  Unable to state - No c/o numbness or tingling   Tone: WFL   Edema: none noted    Comments: Upon arrival patient supine finishing up speech evaluation. OT evaluation performed with patient cooperative and difficulty stating wants and needs consistently. At end of session, patient supine in bed side lying to L with relief of pain and  with call light and phone within reach, all lines and tubes intact. Overall patient demonstrated  decreased independence and safety during completion of ADL/functional transfer/mobility tasks.   Pt would benefit from continued skilled OT to increase safety and independence with completion of ADL/IADL tasks for functional independence

## 2021-07-02 NOTE — PROGRESS NOTES
Visit made to unit. Spoke with Meaghan Sage RN. Patient has been seen by speech, PT/OT today. Son at the bedside. Family would like patient to continue with PT/OT and speech therapies. No hospice at this time. Will discontinue order. Thank you for the referral. Please call [facility] with any new need or request from family.

## 2021-07-02 NOTE — CARE COORDINATION
7/2/2021 - Transferred from MICU to general floor. Neurology following. PT/OT/SLP evals done. Dysphagia pureed diet ordered. Hospice signed off case. Spoke with pt son Vivi Ruiz and he and his sister have [facility] and would like referral there. They visited there yesterday and spoke with the DON. Sent referral message to Lynn at [facility] to see if they can accept pt. Will await her return message. JEFFREY/RUPAL will follow. Addendum - received message from Nuzhat at [facility] that pt can go to [facility] on Saturday. HENS completed and placed in envelope and put on soft chart. Sania set up transport with [facility] for Saturday at [address]. JEFFREY/Rupal will follow.

## 2021-07-02 NOTE — PROGRESS NOTES
Physical Therapy    Physical Therapy Initial Assessment     Name: Reji Mcdonald  : 1926  MRN: [id number]      Date of Service: 2021    Evaluating PT:  Owen Lalo, PT, DPT [id number]     Room #:  3464/1793-U  Diagnosis:  Stroke aborted by administration of thrombolytic agent ([facility] [I63.9]  PMHx/PSHx:  HTN, dementia    Precautions:  Falls, bed alarm, Aphasia  Equipment Needs:  TBD    SUBJECTIVE:    Pt was unable to provide social history. Per chart, pt lives with her daughter and son in law. Pt used [employer] for amb. OBJECTIVE:   Initial Evaluation  Date:  Treatment Short Term/ Long Term   Goals   AM-PAC 6 Clicks 55/65     Was pt agreeable to Eval/treatment? Yes      Does pt have pain? Pt appeared to have discomfort in buttock when sitting EOB     Bed Mobility  Rolling: Min A  Supine to sit: Min A  Sit to supine: Min A  Scooting: Min A to EOB  Rolling: Supervision  Supine to sit: Supervision  Sit to supine: Supervision  Scooting: Supervision   Transfers Sit to stand: Min A   Stand to sit: Min A   Stand pivot: NT  Sit to stand: Supervision  Stand to sit: Supervision    Stand pivot: Supervision using AAD   Ambulation    2-3 feet side stepping along EOB using [employer] with Min A   >50 feet using [employer] with SBA   Stair negotiation: ascended and descended  NT  TBD   ROM BUE:  Defer to OT  BLE:  WFL     Strength BUE:  Defer to OT  BLE:  Grossly 4-/5; unable to formally assess due to aphasia and pt perseverating   Increase by at least 1/3 MMT grade   Balance Sitting EOB:  Min A   Dynamic Standing:  Min A using [employer]  Sitting EOB:  Supervision  Dynamic Standing:  SBA     Pt is A & O x 2?? Pt was able to identify hospital with choices  Sensation:  Unable to assess  Edema:  NA    Patient education  Pt educated on PT role, safety with mobility, transfer technique, and postural reducation.       Patient response to education:   Pt verbalized understanding Pt demonstrated skill Pt requires further education in this area   Yes LOB.     Transfer training with VCs for hand placement, sequencing and technique. Physical assist to complete task and correct LOB and unsteadiness upon standing.  Standing balance retraining with VCs for upright posture and forward gaze. Physical assist to correct unsteadiness. Standing weight shifts to R/L with physical assist.      · Gait training- pt was given verbal and tactile cues to facilitate increased step height and body positioning inside walker during ambulation as well as provided with physical assistance to complete task. · Therapeutic Exercises/Activities as noted above.  Patient education provided continuously throughout session with focus on correcting deviations or safety concerns observed throughout session. Pt's/ family goals   1. None stated     Prognosis is good for reaching above PT goals. Patient and or family understand(s) diagnosis, prognosis, and plan of care. Yes     PHYSICAL THERAPY PLAN OF CARE:    PT POC is established based on physician order and patient diagnosis     Referring provider/PT Order:  NASIM Segal CNP / PT eval and treat (Order #[id number]) on 7/1/21  Diagnosis:  Stroke aborted by administration of thrombolytic agent ([facility] [I63.9]  Specific instructions for next treatment:  Ambulation and transfer OOB as tolerated.      Current Treatment Recommendations:     [x] Strengthening to improve independence with functional mobility   [x] ROM to improve independence with functional mobility   [x] Balance Training to improve static/dynamic balance and to reduce fall risk  [x] Endurance Training to improve activity tolerance during functional mobility   [x] Transfer Training to improve safety and independence with all functional transfers   [x] Gait Training to improve gait mechanics, endurance and assess need for appropriate assistive device  [x] Stair Training in preparation for safe discharge home and/or into the community   [x] Positioning to prevent skin breakdown and contractures  [x] Safety and Education Training   [x] Patient/Caregiver Education   [] HEP  [] Other     PT long term treatment goals are located in above grid    Frequency of treatments: 2-5x/week x 1-2 weeks. Time in  0850  Time out  0925    Total Treatment Time  15 minutes     Evaluation Time includes thorough review of current medical information, gathering information on past medical history/social history and prior level of function, completion of standardized testing/informal observation of tasks, assessment of data and education on plan of care and goals.     CPT codes:  [x] Low Complexity PT evaluation 16504  [] Moderate Complexity PT evaluation 13502  [] High Complexity PT evaluation 00373  [] PT Re-evaluation 44412  [] Gait training 06795 - minutes  [] Manual therapy 44521 - minutes  [x] Therapeutic activities 00777 15 minutes  [] Therapeutic exercises 74442 - minutes  [] Neuromuscular reeducation 63812 - minutes     Shabbir Baum, PT, DPT [id number]

## 2021-07-02 NOTE — PROGRESS NOTES
OT ordered appreciated. Due to hospice status patient will be discharged from OT evaluation. Thank you.  Levi Helms, OTR/L #[id number]

## 2021-07-03 VITALS
BODY MASS INDEX: 26.43 KG/M2 | TEMPERATURE: 97.4 F | RESPIRATION RATE: 16 BRPM | DIASTOLIC BLOOD PRESSURE: 73 MMHG | HEIGHT: 61 IN | HEART RATE: 88 BPM | SYSTOLIC BLOOD PRESSURE: 144 MMHG | WEIGHT: 140 LBS | OXYGEN SATURATION: 97 %

## 2021-07-03 PROCEDURE — C9113 INJ PANTOPRAZOLE SODIUM, VIA: HCPCS | Performed by: NURSE PRACTITIONER

## 2021-07-03 PROCEDURE — 6370000000 HC RX 637 (ALT 250 FOR IP): Performed by: INTERNAL MEDICINE

## 2021-07-03 PROCEDURE — 6360000002 HC RX W HCPCS: Performed by: NURSE PRACTITIONER

## 2021-07-03 PROCEDURE — 2580000003 HC RX 258: Performed by: NURSE PRACTITIONER

## 2021-07-03 RX ADMIN — PSYLLIUM HUSK 1 PACKET: 3.4 GRANULE ORAL at 08:56

## 2021-07-03 RX ADMIN — PANTOPRAZOLE SODIUM 40 MG: 40 INJECTION, POWDER, FOR SOLUTION INTRAVENOUS at 08:56

## 2021-07-03 RX ADMIN — SODIUM CHLORIDE, PRESERVATIVE FREE 10 ML: 5 INJECTION INTRAVENOUS at 08:57

## 2021-07-03 RX ADMIN — AMLODIPINE BESYLATE 5 MG: 5 TABLET ORAL at 08:56

## 2021-07-03 RX ADMIN — SODIUM CHLORIDE: 9 INJECTION, SOLUTION INTRAVENOUS at 10:55

## 2021-07-03 NOTE — PLAN OF CARE
Problem: Skin Integrity:  Goal: Will show no infection signs and symptoms  Description: Will show no infection signs and symptoms  7/3/2021 1427 by Alivia Newell RN  Outcome: Met This Shift  7/3/2021 0205 by Erickson Lee RN  Outcome: Met This Shift  Goal: Absence of new skin breakdown  Description: Absence of new skin breakdown  7/3/2021 1427 by Alivia Newell RN  Outcome: Met This Shift  7/3/2021 0205 by Erickson Lee RN  Outcome: Met This Shift     Problem: Falls - Risk of:  Goal: Will remain free from falls  Description: Will remain free from falls  7/3/2021 1427 by Alivia Newell RN  Outcome: Met This Shift  7/3/2021 0205 by Erickson Lee RN  Outcome: Met This Shift  Goal: Absence of physical injury  Description: Absence of physical injury  7/3/2021 1427 by Alivia Newell RN  Outcome: Met This Shift  7/3/2021 0205 by Erickson Lee RN  Outcome: Met This Shift

## 2021-07-03 NOTE — PLAN OF CARE
Problem: Skin Integrity:  Goal: Will show no infection signs and symptoms  Description: Will show no infection signs and symptoms  7/3/2021 1737 by Ernestine Casiano RN  Outcome: Completed  7/3/2021 1427 by Ernestine Casiano RN  Outcome: Met This Shift  Goal: Absence of new skin breakdown  Description: Absence of new skin breakdown  7/3/2021 1737 by Ernestine Casiano RN  Outcome: Completed  7/3/2021 1427 by Ernestine Casiano RN  Outcome: Met This Shift     Problem: Falls - Risk of:  Goal: Will remain free from falls  Description: Will remain free from falls  7/3/2021 1737 by Ernestine Casiano RN  Outcome: Completed  7/3/2021 1427 by Ernestine Casiano RN  Outcome: Met This Shift  Goal: Absence of physical injury  Description: Absence of physical injury  7/3/2021 1737 by Ernestine Casiano RN  Outcome: Completed  7/3/2021 1427 by Ernestine Casiano RN  Outcome: Met This Shift

## 2021-07-03 NOTE — DISCHARGE SUMMARY
Condition (MA) What reading provider will be dictating this exam?->CRC FINDINGS: BRAIN/VENTRICLES: No mass effect, edema or hemorrhage is seen. Moderate volume loss is seen in the cerebrum with moderate chronic microvascular ischemic changes. No hydrocephalus or extra-axial fluid is seen. ORBITS: The visualized portion of the orbits demonstrate no acute abnormality. SINUSES: The visualized paranasal sinuses and mastoid air cells demonstrate no acute abnormality. SOFT TISSUES/SKULL:  No acute abnormality of the visualized skull or soft tissues. No acute intracranial abnormality. XR CHEST PORTABLE    Result Date: 2021  EXAMINATION: ONE XRAY VIEW OF THE CHEST 2021 6:22 pm COMPARISON: None. HISTORY: ORDERING SYSTEM PROVIDED HISTORY: altered mental status TECHNOLOGIST PROVIDED HISTORY: Reason for exam:->altered mental status What reading provider will be dictating this exam?->CRC FINDINGS: The lungs are without acute focal process. There is no effusion or pneumothorax. The cardiomediastinal silhouette is without acute process. The osseous structures are without acute process. No acute process. CTA NECK W CONTRAST    Result Date: 2021  Patient MRN:  [id number] : 1926 Age: 80 years Gender: Female Order Date:  2021 6:32 PM EXAM: CTA NECK W CONTRAST, CTA HEAD W CONTRAST NUMBER OF IMAGES:  1 INDICATION:  cva cva Decision Support Exception - unselect if not a suspected or confirmed emergency medical condition->Emergency Medical Condition (MA) What reading provider will be dictating this exam?->[facility] COMPARISON: None Technique: Low-dose CT  acquisition technique included one of following options; 1 . Automated exposure control, 2. Adjustment of MA and or KV according to patient's size or 3. Use of iterative reconstruction. Contiguous spiral images were obtained in the axial plane, following the administration of intravenous contrast using CT angiographic protocol.  Sagittal and HISTORY: ? stroke TECHNOLOGIST PROVIDED HISTORY: Reason for exam:->? stroke What reading provider will be dictating this exam?->CRC FINDINGS: INTRACRANIAL STRUCTURES/VENTRICLES:   Multifocal hemorrhages are seen in the cerebral hemispheres, more prominent on the left. The largest hemorrhagic focus containing fluid fluid level in the left parietal lobe measures approximately 3.0 x 2.4 cm. Additional smaller hemorrhagic foci are seen in the bilateral frontal lobes and the left occipital lobe. T2 hyperintensities in the sulci, some of which demonstrate diffusion restriction, are consistent with subarachnoid blood. There is a tiny 3 mm focus of diffusion restriction in the right cerebellar hemisphere (series 3, image 11) which does not correspond to a hemorrhagic focus and may represent an acute or early subacute lacunar infarction. Chronic lacunar infarctions are seen in the left caudate head and bilateral thalami. The remainder of the brain is notable for mild-to-moderate volume loss and mild-to-moderate chronic microvascular ischemic changes. No intraventricular blood seen. No hydrocephalus ORBITS: Prosthetic lenses are seen in the globes bilaterally. The orbits are otherwise grossly unremarkable. SINUSES: Mild mucosal thickening in the paranasal sinuses trace right mastoid effusion. BONES/SOFT TISSUES: The bone marrow signal intensity appears normal. The soft tissues demonstrate no acute abnormality. 1. Bilateral cerebral hemorrhagic foci, more numerous on the left. The largest measures 3.0 x 2.4 cm in the left parietal lobe. 2. Subarachnoid blood is also seen in the cerebral hemispheres bilaterally, most notably along the posterior aspect of the left cerebral hemisphere. 3. Probable 3 mm focus of acute or early subacute infarction in the right cerebellar hemisphere. 4. The etiology of the hemorrhages is unclear and may be due to anticoagulation.   Further evaluation with contrast enhanced MRI is recommended. Discharge Exam:    HEENT: NCAT,  PERRLA, No JVD  Heart:  RRR, no murmurs, gallops, or rubs. Lungs:  CTA bilaterally, no wheeze, rales or rhonchi  Abd: bowel sounds present, nontender, nondistended, no masses  Extrem:  No clubbing, cyanosis, or edema    Disposition: [facility]     Patient Condition at Discharge: Stable    Patient Instructions:      Medication List      CONTINUE taking these medications    amLODIPine 5 MG tablet  Commonly known as: NORVASC     bisacodyl 5 MG EC tablet  Commonly known as: DULCOLAX     escitalopram 5 MG tablet  Commonly known as: LEXAPRO     psyllium 28.3 % Pack  Commonly known as: [place]        STOP taking these medications    clopidogrel 75 MG tablet  Commonly known as: PLAVIX          Activity: activity as tolerated  Diet: regular diet    Pt has been advised to: Follow-up with Mathieu Rose MD in 1 week.   Follow-up with consultants as recommended by them    Note that over 30 minutes was spent in preparing discharge papers, discussing discharge with patient, medication review, etc.    Signed:  Sylvie Reyez MD  7/3/2021  2:51 PM

## 2021-07-03 NOTE — PROGRESS NOTES
Pt's daughter Luciana Hyman signed consent for the pt to have Dental soft diet. Constantine Huffman RN second co-signer. Pt's daughter was explained the possibly of aspiration, choking, and aspiration pneumonia which the diet changes. Daughter stated that she would still like the diet changed.

## 2021-07-03 NOTE — PLAN OF CARE
Problem: Skin Integrity:  Goal: Will show no infection signs and symptoms  Description: Will show no infection signs and symptoms  7/3/2021 0205 by Francisca Nielsen RN  Outcome: Met This Shift  7/2/2021 1206 by Denny Grubbs RN  Outcome: Met This Shift  Goal: Absence of new skin breakdown  Description: Absence of new skin breakdown  7/3/2021 0205 by Francisca Nielsen RN  Outcome: Met This Shift  7/2/2021 1206 by Denny Grubbs RN  Outcome: Met This Shift     Problem: Falls - Risk of:  Goal: Will remain free from falls  Description: Will remain free from falls  7/3/2021 0205 by Francisca Nielsen RN  Outcome: Met This Shift  7/2/2021 1206 by Denny Grubbs RN  Outcome: Met This Shift  Goal: Absence of physical injury  Description: Absence of physical injury  7/3/2021 0205 by Francisca Nielsen RN  Outcome: Met This Shift  7/2/2021 1206 by Denny Grubbs RN  Outcome: Met This Shift

## 2021-07-03 NOTE — PROGRESS NOTES
Pt's family is requesting that the pt's diet be changed to a general diet or a dental soft diet. Educated family on the importance of the pt having to diet that was recommend to her. Family would still like to see if it could be changed. Message left for Dr Rashad Abbasi regarding this. Awaiting response.

## 2021-07-03 NOTE — DISCHARGE INSTR - COC
Continuity of Care Form    Patient Name: Lazaro Stockton   :  1926  MRN:  [id number]    Admit date:  2021  Discharge date:  7/3/21    Code Status Order: DNR-CCA   Advance Directives:   Advance Care Flowsheet Documentation       Date/Time Healthcare Directive Type of Healthcare Directive Copy in [address] Agent's Name Healthcare Agent's Phone Number    [phone]  Yes, patient has an advance directive for healthcare treatment  Durable power of  for health care  No, copy requested from family  Healthcare power [facility]   [facility]  [phone]            Admitting Physician:  Delon Bernard MD  PCP: Ludmila Butler MD    Discharging Nurse: [facility] Unit/Room#: 1602/0147-N  Discharging Unit Phone Number: [phone]    Emergency Contact:   Extended Emergency Contact Information  Primary Emergency Contact: Teresa Bell  Address: [address], [address] Phone: [phone]  Mobile Phone: [phone]  Relation: Child  Preferred language: English   needed? No  Secondary Emergency Contact: Felipe Bell  Address: [address], [address] Phone: [phone]  Mobile Phone: [phone]  Relation: Other  Preferred language: English   needed? No    Past Surgical History:  Past Surgical History:   Procedure Laterality Date    APPENDECTOMY      BLADDER REPAIR      CATARACT REMOVAL      HYSTERECTOMY      JOINT REPLACEMENT         Immunization History: There is no immunization history on file for this patient.     Active Problems:  Patient Active Problem List   Diagnosis Code    Expressive aphasia R47.01    Bilateral carotid artery stenosis I65.23    Cerebrovascular accident (CVA) ([facility] [address] I63.9       Isolation/Infection:   Isolation            No Isolation          Patient Infection Status       Infection Onset Added Last Indicated Last Indicated By Review Planned Expiration Resolved Resolved By    None active    Resolved    COVID-19 Rule Out 06/29/21 06/29/21 06/29/21 COVID-19, Rapid (Ordered)   06/29/21 Rule-Out Test Resulted            Nurse Assessment:  Last Vital Signs: /72   Pulse 75   Temp 97.8 °F (36.6 °C) (Temporal)   Resp 16   Ht 5' 1\" (1.549 m)   Wt 140 lb (63.5 kg)   SpO2 95%   BMI 26.45 kg/m²     Last documented pain score (0-10 scale): Pain Level: 2  Last Weight:   Wt Readings from Last 1 Encounters:   06/29/21 140 lb (63.5 kg)     Mental Status:  alert    IV Access:  - None    Nursing Mobility/ADLs:  Walking   Dependent  Transfer  Dependent  Bathing  Dependent  Dressing  Dependent  Toileting  Dependent  Feeding  Assisted  Med Admin  Assisted  Med Delivery   whole    Wound Care Documentation and Therapy:        Elimination:  Continence:   · Bowel: No  · Bladder: No  Urinary Catheter: None   Colostomy/Ileostomy/Ileal Conduit: No       Date of Last BM: 7/2/21    Intake/Output Summary (Last 24 hours) at 7/3/2021 1452  Last data filed at 7/3/2021 1436  Gross per 24 hour   Intake 1300 ml   Output    Net 1300 ml     I/O last 3 completed shifts: In: 8738 [P.O.:450; I.V.:1400]  Out: 200 [Urine:200]    Safety Concerns: At Risk for Falls and Aspiration Risk    Impairments/Disabilities:      None    Nutrition Therapy:  Current Nutrition Therapy:   - Oral Diet:  General    Routes of Feeding: Oral  Liquids: Thin Liquids  Daily Fluid Restriction: no  Last Modified Barium Swallow with Video (Video Swallowing Test): not done    Treatments at the Time of Hospital Discharge:   Respiratory Treatments: n/a  Oxygen Therapy:  is not on home oxygen therapy.   Ventilator:    - No ventilator support    Rehab Therapies: Physical Therapy and Occupational Therapy  Weight Bearing Status/Restrictions: No weight bearing restirctions  Other Medical Equipment (for information only, NOT a DME order):  walker  Other Treatments: n/a    Patient's personal belongings (please select all that are sent with patient):  Glasses, Hearing Aides bilateral    RN SIGNATURE:  Electronically signed by Nora Baum RN on 7/3/21 at 4:37 PM EDT    CASE MANAGEMENT/SOCIAL WORK SECTION    Inpatient Status Date: ***    Readmission Risk Assessment Score:  Readmission Risk              Risk of Unplanned Readmission:  12           Discharging to Facility/ Agency   · Name:   · Address:  · Phone:  · Fax:    Dialysis Facility (if applicable)   · Name:  · Address:  · Dialysis Schedule:  · Phone:  · Fax:    / signature: {Esignature:[id number]:::0}    PHYSICIAN SECTION    Prognosis: Fair    Condition at Discharge: Stable    Rehab Potential (if transferring to Rehab): Fair    Recommended Labs or Other Treatments After Discharge: cbc and bmp , repeat head ct in 1 week to see if bleed is gone so asa/ plavix can be resumed     Physician Certification: I certify the above information and transfer of Shabana Umaña  is necessary for the continuing treatment of the diagnosis listed and that she requires [facility] for less 30 days.      Update Admission H&P: No change in H&P    PHYSICIAN SIGNATURE:  Electronically signed by Ashwini Colin MD on 7/3/21 at 2:53 PM EDT

## 2021-07-07 ENCOUNTER — HOSPITAL ENCOUNTER (EMERGENCY)
Age: 86
Discharge: ANOTHER HOSPITAL | End: 2021-07-07
Attending: EMERGENCY MEDICINE | Admitting: INTERNAL MEDICINE
Payer: MEDICARE

## 2021-07-07 ENCOUNTER — APPOINTMENT (OUTPATIENT)
Dept: GENERAL RADIOLOGY | Age: 86
End: 2021-07-07
Payer: MEDICARE

## 2021-07-07 ENCOUNTER — APPOINTMENT (OUTPATIENT)
Dept: CT IMAGING | Age: 86
End: 2021-07-07
Payer: MEDICARE

## 2021-07-07 ENCOUNTER — HOSPITAL ENCOUNTER (INPATIENT)
Age: 86
LOS: 4 days | Discharge: NURSING, REHAB OR LONG-TERM CARE | DRG: 522 | End: 2021-07-11
Attending: INTERNAL MEDICINE | Admitting: INTERNAL MEDICINE
Payer: MEDICARE

## 2021-07-07 VITALS
DIASTOLIC BLOOD PRESSURE: 93 MMHG | SYSTOLIC BLOOD PRESSURE: 189 MMHG | OXYGEN SATURATION: 96 % | HEART RATE: 99 BPM | RESPIRATION RATE: 16 BRPM | TEMPERATURE: 98.2 F

## 2021-07-07 DIAGNOSIS — R94.31 PROLONGED QT INTERVAL: ICD-10-CM

## 2021-07-07 DIAGNOSIS — Z96.649 S/P HIP HEMIARTHROPLASTY: ICD-10-CM

## 2021-07-07 DIAGNOSIS — S72.002A CLOSED LEFT HIP FRACTURE, INITIAL ENCOUNTER (HCC): Primary | ICD-10-CM

## 2021-07-07 DIAGNOSIS — S72.002A CLOSED FRACTURE OF LEFT HIP, INITIAL ENCOUNTER (HCC): Primary | ICD-10-CM

## 2021-07-07 DIAGNOSIS — I69.30 HISTORY OF HEMORRHAGIC CEREBROVASCULAR ACCIDENT (CVA) WITH RESIDUAL DEFICIT: ICD-10-CM

## 2021-07-07 PROBLEM — E87.6 HYPOKALEMIA: Status: ACTIVE | Noted: 2021-07-07

## 2021-07-07 PROBLEM — I10 HTN (HYPERTENSION): Status: ACTIVE | Noted: 2021-07-07

## 2021-07-07 PROBLEM — I61.9 CEREBRAL PARENCHYMAL HEMORRHAGE (HCC): Status: ACTIVE | Noted: 2021-07-07

## 2021-07-07 PROBLEM — S72.033A: Status: ACTIVE | Noted: 2021-07-07

## 2021-07-07 PROBLEM — R47.01 EXPRESSIVE APHASIA: Status: RESOLVED | Noted: 2019-11-05 | Resolved: 2021-07-07

## 2021-07-07 PROBLEM — Z86.73 HISTORY OF CVA (CEREBROVASCULAR ACCIDENT): Status: ACTIVE | Noted: 2021-07-07

## 2021-07-07 PROBLEM — T14.8XXA SUBLUXATION: Status: ACTIVE | Noted: 2021-07-07

## 2021-07-07 PROBLEM — D72.829 LEUKOCYTOSIS: Status: ACTIVE | Noted: 2021-07-07
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
## 2021-07-07 LAB
ABO/RH: NORMAL
ANION GAP SERPL CALCULATED.3IONS-SCNC: 12 MMOL/L (ref 7–16)
ANTIBODY IDENTIFICATION: NORMAL
ANTIBODY SCREEN: NORMAL
BASOPHILS ABSOLUTE: 0.02 E9/L (ref 0–0.2)
BASOPHILS RELATIVE PERCENT: 0.1 % (ref 0–2)
BUN BLDV-MCNC: 16 MG/DL (ref 6–23)
C (LITTLE) ANTIGEN: NORMAL
C ANTIGEN: NORMAL
CALCIUM SERPL-MCNC: 10.6 MG/DL (ref 8.6–10.2)
CHLORIDE BLD-SCNC: 101 MMOL/L (ref 98–107)
CO2: 27 MMOL/L (ref 22–29)
CREAT SERPL-MCNC: 0.7 MG/DL (ref 0.5–1)
DAT POLYSPECIFIC: NORMAL
DR. NOTIFY: NORMAL
E ANTIGEN: NORMAL
EOSINOPHILS ABSOLUTE: 0.01 E9/L (ref 0.05–0.5)
EOSINOPHILS RELATIVE PERCENT: 0.1 % (ref 0–6)
GFR AFRICAN AMERICAN: >60
GFR NON-AFRICAN AMERICAN: >60 ML/MIN/1.73
GLUCOSE BLD-MCNC: 138 MG/DL (ref 74–99)
HCT VFR BLD CALC: 48.2 % (ref 34–48)
HEMOGLOBIN: 16.1 G/DL (ref 11.5–15.5)
IMMATURE GRANULOCYTES #: 0.09 E9/L
IMMATURE GRANULOCYTES %: 0.6 % (ref 0–5)
JKA ANTIGEN: NORMAL
LYMPHOCYTES ABSOLUTE: 0.79 E9/L (ref 1.5–4)
LYMPHOCYTES RELATIVE PERCENT: 5 % (ref 20–42)
MAGNESIUM: 1.9 MG/DL (ref 1.6–2.6)
MCH RBC QN AUTO: 27.4 PG (ref 26–35)
MCHC RBC AUTO-ENTMCNC: 33.4 % (ref 32–34.5)
MCV RBC AUTO: 82.1 FL (ref 80–99.9)
MONOCYTES ABSOLUTE: 0.9 E9/L (ref 0.1–0.95)
MONOCYTES RELATIVE PERCENT: 5.6 % (ref 2–12)
NEUTROPHILS ABSOLUTE: 14.12 E9/L (ref 1.8–7.3)
NEUTROPHILS RELATIVE PERCENT: 88.6 % (ref 43–80)
PDW BLD-RTO: 14.2 FL (ref 11.5–15)
PLATELET # BLD: 232 E9/L (ref 130–450)
PMV BLD AUTO: 10.3 FL (ref 7–12)
POTASSIUM REFLEX MAGNESIUM: 2.9 MMOL/L (ref 3.5–5)
RBC # BLD: 5.87 E12/L (ref 3.5–5.5)
SODIUM BLD-SCNC: 140 MMOL/L (ref 132–146)
TOTAL CK: 44 U/L (ref 20–180)
WBC # BLD: 15.9 E9/L (ref 4.5–11.5)

## 2021-07-07 PROCEDURE — 73060 X-RAY EXAM OF HUMERUS: CPT

## 2021-07-07 PROCEDURE — 73560 X-RAY EXAM OF KNEE 1 OR 2: CPT

## 2021-07-07 PROCEDURE — 80048 BASIC METABOLIC PNL TOTAL CA: CPT

## 2021-07-07 PROCEDURE — 73502 X-RAY EXAM HIP UNI 2-3 VIEWS: CPT

## 2021-07-07 PROCEDURE — 71045 X-RAY EXAM CHEST 1 VIEW: CPT

## 2021-07-07 PROCEDURE — 86900 BLOOD TYPING SEROLOGIC ABO: CPT

## 2021-07-07 PROCEDURE — 85610 PROTHROMBIN TIME: CPT

## 2021-07-07 PROCEDURE — 99222 1ST HOSP IP/OBS MODERATE 55: CPT | Performed by: PHYSICIAN ASSISTANT

## 2021-07-07 PROCEDURE — 99222 1ST HOSP IP/OBS MODERATE 55: CPT | Performed by: ORTHOPAEDIC SURGERY

## 2021-07-07 PROCEDURE — 85730 THROMBOPLASTIN TIME PARTIAL: CPT

## 2021-07-07 PROCEDURE — 96376 TX/PRO/DX INJ SAME DRUG ADON: CPT

## 2021-07-07 PROCEDURE — 70450 CT HEAD/BRAIN W/O DYE: CPT

## 2021-07-07 PROCEDURE — 96366 THER/PROPH/DIAG IV INF ADDON: CPT

## 2021-07-07 PROCEDURE — 86880 COOMBS TEST DIRECT: CPT

## 2021-07-07 PROCEDURE — 85025 COMPLETE CBC W/AUTO DIFF WBC: CPT

## 2021-07-07 PROCEDURE — 86870 RBC ANTIBODY IDENTIFICATION: CPT

## 2021-07-07 PROCEDURE — 93005 ELECTROCARDIOGRAM TRACING: CPT | Performed by: STUDENT IN AN ORGANIZED HEALTH CARE EDUCATION/TRAINING PROGRAM

## 2021-07-07 PROCEDURE — 99285 EMERGENCY DEPT VISIT HI MDM: CPT

## 2021-07-07 PROCEDURE — 72125 CT NECK SPINE W/O DYE: CPT

## 2021-07-07 PROCEDURE — 6360000002 HC RX W HCPCS: Performed by: STUDENT IN AN ORGANIZED HEALTH CARE EDUCATION/TRAINING PROGRAM

## 2021-07-07 PROCEDURE — 86901 BLOOD TYPING SEROLOGIC RH(D): CPT

## 2021-07-07 PROCEDURE — 73552 X-RAY EXAM OF FEMUR 2/>: CPT

## 2021-07-07 PROCEDURE — 84132 ASSAY OF SERUM POTASSIUM: CPT

## 2021-07-07 PROCEDURE — 96375 TX/PRO/DX INJ NEW DRUG ADDON: CPT

## 2021-07-07 PROCEDURE — 86905 BLOOD TYPING RBC ANTIGENS: CPT

## 2021-07-07 PROCEDURE — 36415 COLL VENOUS BLD VENIPUNCTURE: CPT

## 2021-07-07 PROCEDURE — 96365 THER/PROPH/DIAG IV INF INIT: CPT

## 2021-07-07 PROCEDURE — 6360000002 HC RX W HCPCS: Performed by: EMERGENCY MEDICINE

## 2021-07-07 PROCEDURE — 2140000000 HC CCU INTERMEDIATE R&B

## 2021-07-07 PROCEDURE — 83735 ASSAY OF MAGNESIUM: CPT

## 2021-07-07 PROCEDURE — 82550 ASSAY OF CK (CPK): CPT

## 2021-07-07 PROCEDURE — 86850 RBC ANTIBODY SCREEN: CPT

## 2021-07-07 RX ORDER — ACETAMINOPHEN 325 MG/1
650 TABLET ORAL EVERY 6 HOURS SCHEDULED
Status: DISCONTINUED | OUTPATIENT
Start: 2021-07-08 | End: 2021-07-11 | Stop reason: HOSPADM

## 2021-07-07 RX ORDER — FENTANYL CITRATE 50 UG/ML
25 INJECTION, SOLUTION INTRAMUSCULAR; INTRAVENOUS ONCE
Status: COMPLETED | OUTPATIENT
Start: 2021-07-07 | End: 2021-07-07

## 2021-07-07 RX ORDER — MORPHINE SULFATE 2 MG/ML
2 INJECTION, SOLUTION INTRAMUSCULAR; INTRAVENOUS ONCE
Status: COMPLETED | OUTPATIENT
Start: 2021-07-07 | End: 2021-07-07

## 2021-07-07 RX ORDER — MORPHINE SULFATE 2 MG/ML
1 INJECTION, SOLUTION INTRAMUSCULAR; INTRAVENOUS EVERY 4 HOURS PRN
Status: DISCONTINUED | OUTPATIENT
Start: 2021-07-07 | End: 2021-07-08

## 2021-07-07 RX ORDER — SODIUM CHLORIDE 0.9 % (FLUSH) 0.9 %
5-40 SYRINGE (ML) INJECTION PRN
Status: CANCELLED | OUTPATIENT
Start: 2021-07-07

## 2021-07-07 RX ORDER — SODIUM CHLORIDE 9 MG/ML
25 INJECTION, SOLUTION INTRAVENOUS PRN
Status: DISCONTINUED | OUTPATIENT
Start: 2021-07-07 | End: 2021-07-11 | Stop reason: HOSPADM

## 2021-07-07 RX ORDER — MORPHINE SULFATE 2 MG/ML
1 INJECTION, SOLUTION INTRAMUSCULAR; INTRAVENOUS EVERY 4 HOURS PRN
Status: CANCELLED | OUTPATIENT
Start: 2021-07-07

## 2021-07-07 RX ORDER — POLYETHYLENE GLYCOL 3350 17 G/17G
17 POWDER, FOR SOLUTION ORAL DAILY PRN
Status: DISCONTINUED | OUTPATIENT
Start: 2021-07-07 | End: 2021-07-11 | Stop reason: HOSPADM

## 2021-07-07 RX ORDER — MORPHINE SULFATE 2 MG/ML
2 INJECTION, SOLUTION INTRAMUSCULAR; INTRAVENOUS EVERY 4 HOURS PRN
Status: CANCELLED | OUTPATIENT
Start: 2021-07-07

## 2021-07-07 RX ORDER — MORPHINE SULFATE 2 MG/ML
2 INJECTION, SOLUTION INTRAMUSCULAR; INTRAVENOUS EVERY 4 HOURS PRN
Status: DISCONTINUED | OUTPATIENT
Start: 2021-07-07 | End: 2021-07-08

## 2021-07-07 RX ORDER — OXYCODONE HYDROCHLORIDE 5 MG/1
5 TABLET ORAL EVERY 4 HOURS PRN
Status: DISCONTINUED | OUTPATIENT
Start: 2021-07-07 | End: 2021-07-10

## 2021-07-07 RX ORDER — SODIUM CHLORIDE 0.9 % (FLUSH) 0.9 %
5-40 SYRINGE (ML) INJECTION PRN
Status: DISCONTINUED | OUTPATIENT
Start: 2021-07-07 | End: 2021-07-11 | Stop reason: HOSPADM

## 2021-07-07 RX ORDER — ESCITALOPRAM OXALATE 10 MG/1
5 TABLET ORAL DAILY
Status: DISCONTINUED | OUTPATIENT
Start: 2021-07-08 | End: 2021-07-11 | Stop reason: HOSPADM

## 2021-07-07 RX ORDER — OXYCODONE HYDROCHLORIDE 10 MG/1
10 TABLET ORAL EVERY 4 HOURS PRN
Status: DISCONTINUED | OUTPATIENT
Start: 2021-07-07 | End: 2021-07-10

## 2021-07-07 RX ORDER — POLYETHYLENE GLYCOL 3350 17 G/17G
17 POWDER, FOR SOLUTION ORAL DAILY PRN
COMMUNITY

## 2021-07-07 RX ORDER — AMLODIPINE BESYLATE 5 MG/1
5 TABLET ORAL DAILY
Status: CANCELLED | OUTPATIENT
Start: 2021-07-07

## 2021-07-07 RX ORDER — SODIUM CHLORIDE 0.9 % (FLUSH) 0.9 %
5-40 SYRINGE (ML) INJECTION EVERY 12 HOURS SCHEDULED
Status: DISCONTINUED | OUTPATIENT
Start: 2021-07-07 | End: 2021-07-11 | Stop reason: HOSPADM

## 2021-07-07 RX ORDER — AMLODIPINE BESYLATE 5 MG/1
5 TABLET ORAL DAILY
Status: DISCONTINUED | OUTPATIENT
Start: 2021-07-08 | End: 2021-07-11 | Stop reason: HOSPADM

## 2021-07-07 RX ORDER — ESCITALOPRAM OXALATE 5 MG/1
1 TABLET ORAL DAILY
Status: CANCELLED | OUTPATIENT
Start: 2021-07-07

## 2021-07-07 RX ORDER — POTASSIUM CHLORIDE 7.45 MG/ML
10 INJECTION INTRAVENOUS ONCE
Status: COMPLETED | OUTPATIENT
Start: 2021-07-07 | End: 2021-07-07

## 2021-07-07 RX ORDER — SODIUM CHLORIDE 9 MG/ML
25 INJECTION, SOLUTION INTRAVENOUS PRN
Status: CANCELLED | OUTPATIENT
Start: 2021-07-07

## 2021-07-07 RX ORDER — POLYETHYLENE GLYCOL 3350 17 G/17G
17 POWDER, FOR SOLUTION ORAL DAILY PRN
Status: CANCELLED | OUTPATIENT
Start: 2021-07-07

## 2021-07-07 RX ORDER — FENTANYL CITRATE 50 UG/ML
50 INJECTION, SOLUTION INTRAMUSCULAR; INTRAVENOUS ONCE
Status: COMPLETED | OUTPATIENT
Start: 2021-07-07 | End: 2021-07-07

## 2021-07-07 RX ORDER — SODIUM CHLORIDE 0.9 % (FLUSH) 0.9 %
5-40 SYRINGE (ML) INJECTION EVERY 12 HOURS SCHEDULED
Status: CANCELLED | OUTPATIENT
Start: 2021-07-07

## 2021-07-07 RX ADMIN — FENTANYL CITRATE 25 MCG: 50 INJECTION, SOLUTION INTRAMUSCULAR; INTRAVENOUS at 15:20

## 2021-07-07 RX ADMIN — POTASSIUM CHLORIDE 10 MEQ: 7.46 INJECTION, SOLUTION INTRAVENOUS at 18:00

## 2021-07-07 RX ADMIN — FENTANYL CITRATE 50 MCG: 50 INJECTION, SOLUTION INTRAMUSCULAR; INTRAVENOUS at 16:31

## 2021-07-07 RX ADMIN — MORPHINE SULFATE 2 MG: 2 INJECTION, SOLUTION INTRAMUSCULAR; INTRAVENOUS at 19:01

## 2021-07-07 RX ADMIN — POTASSIUM CHLORIDE 10 MEQ: 7.46 INJECTION, SOLUTION INTRAVENOUS at 16:31

## 2021-07-07 RX ADMIN — MORPHINE SULFATE 2 MG: 2 INJECTION, SOLUTION INTRAMUSCULAR; INTRAVENOUS at 20:43

## 2021-07-07 ASSESSMENT — PAIN DESCRIPTION - PAIN TYPE
TYPE: ACUTE PAIN

## 2021-07-07 ASSESSMENT — PAIN DESCRIPTION - DESCRIPTORS: DESCRIPTORS: SORE

## 2021-07-07 ASSESSMENT — PAIN DESCRIPTION - ORIENTATION
ORIENTATION: LEFT

## 2021-07-07 ASSESSMENT — PAIN SCALES - GENERAL
PAINLEVEL_OUTOF10: 10
PAINLEVEL_OUTOF10: 6
PAINLEVEL_OUTOF10: 10
PAINLEVEL_OUTOF10: 10

## 2021-07-07 ASSESSMENT — PAIN DESCRIPTION - LOCATION
LOCATION: HIP

## 2021-07-07 NOTE — CARE COORDINATION
Social Work / Discharge Planning:    Social work contacted liaison and patient is from [facility]. She is skilled, no precert is needed, not a bed hold and can return when medically cleared.  Electronically signed by NGOZI Licona on 7/7/21 at 2:41 PM EDT

## 2021-07-07 NOTE — ED NOTES
Bed: 09  Expected date:   Expected time:   Means of arrival:   Comments:  EMS/left hip     Yissel Barrios RN  07/07/21 1713

## 2021-07-07 NOTE — CONSULTS
palpitations  GASTROINTESTINAL:  negative for nausea, vomiting  GENITOURINARY:  negative for frequency, urinary incontinence  HEMATOLOGIC/LYMPHATIC:  negative for bleeding and petechiae  MUSCULOSKELETAL:  positive for  pain and bone pain  NEUROLOGICAL:  negative for headaches, dizziness  BEHAVIOR/PSYCH:  negative for increased agitation and anxiety    PHYSICAL EXAM:    VITALS:  BP (!) 194/100   Pulse 108   Temp 98.2 °F (36.8 °C) (Oral)   Resp 16   SpO2 95%   CONSTITUTIONAL:  Awake, alert, complaining of L hip pain and appears confused   MUSCULOSKELETAL:  Left lower Extremity:   Shortened and externally rotated   +Log roll   + Heel Strike   -TTP over Knee and Ankle   Skin intact with no signs of degloving   Compartments soft and compressible, calf non-tender   +DP & PT pulses, Brisk Cap refill, Toes warm and perfused   Sensation grossly intact superficial/deep peroneal,saphenous,sural,tibial n. distributions  · +GS/TA/EHL. Secondary Exam:   bilateralUE: No obvious signs of trauma. -TTP to fingers, hand, wrist, forearm, elbow, humerus, shoulder or clavicle. · rightLE: No obvious signs of trauma. -TTP to foot, ankle, leg, knee, thigh, hip.       · Pelvis: -TTP, -Log roll, -Heel strike     DATA:    CBC:   Lab Results   Component Value Date    WBC 15.9 07/07/2021    RBC 5.87 07/07/2021    HGB 16.1 07/07/2021    HCT 48.2 07/07/2021    MCV 82.1 07/07/2021    MCH 27.4 07/07/2021    MCHC 33.4 07/07/2021    RDW 14.2 07/07/2021     07/07/2021    MPV 10.3 07/07/2021     PT/INR:    Lab Results   Component Value Date    PROTIME 11.8 06/29/2021    INR 1.1 06/29/2021     Lactic Acid :   Lab Results   Component Value Date    LACTA 1.8 05/30/2021       Radiology Review:  07/07/21     L Femur, L Hip and Pelvis:    FINDINGS:   Deformity of the left femoral neck is consistent with a transcervical   fracture.  The remainder of the left femur is grossly intact.  Postoperative   changes from total left knee arthroplasty are noted.           Impression   Transcervical fracture of the left femoral neck. .         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  The prosthetic components appear   well aligned with no radiographic evidence of loosening.  No acute fracture   or dislocation is seen. Juris Queta is degenerative change about the SI joint.  No   erosive changes seen.           Impression   1. No evidence of acute osseous abnormality.    2. Right total hip arthroplasty.           L Knee:    FINDINGS:   Satisfactory alignment of left knee arthroplasty.  No periprosthetic fracture   or evidence of loosening.  No obvious synovial effusion.           Impression   Satisfactory alignment of left knee arthroplasty.  No periprosthetic fracture   or evidence of loosening.             R Knee:    FINDINGS:   Mild narrowing of the medial compartment.  Chondrocalcinosis associated with   medial and lateral compartments.  Patella is in normal position.  No synovial   effusion.  No fracture or dislocation of the right knee.           Impression   No fracture or dislocation of the right knee.                 L Humerus:    FINDINGS:   The exam is somewhat limited especially the lateral projection.  Consider   repeat study.  No grossly displaced fractures are identified in the right   humerus.  There is suggestion of subluxation of the humeral head.           Impression   Limited study.  No grossly displaced fractures.       Mild subluxation of the humeral head.                     IMPRESSION:   · Closed, Left Femoral Neck Fracture    PLAN:   Plan for surgery with Dr. Leilani Mead the morning of 7/9/21   Will transfer to [facility]. E's to be admitted under medicine after clearance from neuro s/p recent CVA   NPO after midnight 7/8/21   Needs medical assessment for surgery   L LE Non-weight bearing   Pre-op Labs and Imaging   Pain control per medicine    Okay to lay of R side if more comfortable for patient at this time    Reviewed and discuss with Dr. Severo Paris

## 2021-07-07 NOTE — ED PROVIDER NOTES
[address]      Pt Name: Matthew Hicks  MRN: [id number]  [place] 11/27/1926  Date of evaluation: 7/7/2021      CHIEF COMPLAINT     No chief complaint on file. HPI  Matthew Hicks is a 80 y.o. female who presents to the emergency department after a fall from the nursing home. Apparently around 7 AM she fell. Later in the day she began complaining of hip pain. Is unclear exactly how she fell. She does have a previous history of CVA. History limited secondary to patient dementia. Patient is complaining of left-sided hip pain as well as right leg pain. Review of Systems   Unable to perform ROS: Dementia        Physical Exam  Vitals and nursing note reviewed. Constitutional:       General: She is not in acute distress. Appearance: She is well-developed. Comments: Awake and alert. Sitting in the gurney in no obvious distress. HENT:      Head: Normocephalic and atraumatic. Right Ear: External ear normal.      Left Ear: External ear normal.      Mouth/Throat:      Mouth: Mucous membranes are moist.   Eyes:      General: No scleral icterus. Pupils: Pupils are equal, round, and reactive to light. Cardiovascular:      Rate and Rhythm: Normal rate and regular rhythm. Heart sounds: No murmur heard. Comments: 2+ radial and dorsal pedis pulses bilaterally  Pulmonary:      Effort: Pulmonary effort is normal. No respiratory distress. Breath sounds: Normal breath sounds. No wheezing. Abdominal:      Palpations: Abdomen is soft. Tenderness: There is no abdominal tenderness. There is no guarding or rebound. Musculoskeletal:         General: Tenderness present. Cervical back: Normal range of motion and neck supple. Right lower leg: No edema. Left lower leg: No edema. Comments: Tender to the left hip with decreased range of motion. Mild tenderness to right femur.   Mild tenderness of the left humerus. No obvious deformity. Skin:     General: Skin is warm and dry. Capillary Refill: Capillary refill takes less than 2 seconds. Neurological:      Mental Status: She is alert. Cranial Nerves: No cranial nerve deficit. Sensory: No sensory deficit. Motor: No weakness or abnormal muscle tone. Comments: Patient awake and oriented to person and place but not time. Moving all extremities. Psychiatric:         Mood and Affect: Mood normal.         Behavior: Behavior normal.          Procedures     MDM   This is a 66-year-old female with recent stroke status post TPA conversion to brain bleed, hypertension who presents from nursing facility after fall. In the emergency department the patient is awake and alert, hemodynamically stable, afebrile and in no respiratory distress. She does have fracture to the head of the left femoral neck. X-rays otherwise showed no further fractures. CT imaging of the head and neck showed no acute cranial abnormality no fracture no dislocation. Metabolic, showed mild hypokalemia potassium repleted in the ED. My leukocytosis likely acute phase. No signs of underlying bacterial etiology. CK was normal not consistent with rhabdomyolysis. Compartments soft. Pulses distally strong. Discussed with Dr. Dao Piedra, orthopedics will consult on the patient and recommends [place] facility. Discussed with NP working with [facility] who accepts patient for further evaluation.     ED Course as of Jul 07 1852 Wed Jul 07, 2021   1750 Ortho here in ed evaluating ok w transfer to medicine [facility]    [FILIBERTO]   1755 D/w dr Kamala mario admit for dr Deborah Peng    [FILIBERTO]   [phone] Ortho pa her eevaluating    [FILIBERTO]      ED Course User Index  [FILIBERTO] Willow Crocker DO           ED Course as of Jul 07 1855 Wed Jul 07, 2021   1750 Ortho here in ed evaluating ok w transfer to medicine [facility]    [FILIBERTO]   1755 D/w dr Kamala mario admit for  3.5 - 5.0 mmol/L    Chloride 101 98 - 107 mmol/L    CO2 27 22 - 29 mmol/L    Anion Gap 12 7 - 16 mmol/L    Glucose 138 (H) 74 - 99 mg/dL    BUN 16 6 - 23 mg/dL    CREATININE 0.7 0.5 - 1.0 mg/dL    GFR Non-African American >60 >=60 mL/min/1.73    GFR African American >60     Calcium 10.6 (H) 8.6 - 10.2 mg/dL   CK   Result Value Ref Range    Total CK 44 20 - 180 U/L   Magnesium   Result Value Ref Range    Magnesium 1.9 1.6 - 2.6 mg/dL   Doctor Notification   Result Value Ref Range    DR. HENRIQUEZ COMPL    EKG 12 Lead   Result Value Ref Range    Ventricular Rate 101 BPM    Atrial Rate 101 BPM    P-R Interval 146 ms    QRS Duration 90 ms    Q-T Interval 382 ms    QTc Calculation (Bazett) 495 ms    P Axis 61 degrees    R Axis -25 degrees    T Axis -154 degrees   TYPE AND SCREEN   Result Value Ref Range    ABO/Rh A POS     Antibody Screen POS    DIRECT ANTIGLOBULIN TEST   Result Value Ref Range    Polyspecific Izabela NEG    ANTIBODY IDENTIFICATION   Result Value Ref Range    Antibody ID POS, Anti-Jka        Radiology  CT Head WO Contrast   Final Result   CT head without contrast:      1. No skull fracture or acute intracranial abnormality. 2. Bilateral parenchymal hemorrhages, most prominently in the left parietal   lobe. Allowing for the differences in technique, they are stable as of the   MRI of the brain from 06/30/2021.   3. The subarachnoid blood identified on the previous MRI is not evident on   the current study. CT cervical spine without contrast:      1. No fracture or joint dislocation is seen. 2. Degenerative changes, as described. CT Cervical Spine WO Contrast   Final Result   CT head without contrast:      1. No skull fracture or acute intracranial abnormality. 2. Bilateral parenchymal hemorrhages, most prominently in the left parietal   lobe.   Allowing for the differences in technique, they are stable as of the   MRI of the brain from 06/30/2021.   3. The subarachnoid blood identified on the previous MRI is not evident on   the current study. CT cervical spine without contrast:      1. No fracture or joint dislocation is seen. 2. Degenerative changes, as described. XR CHEST PORTABLE   Final Result   1. There are no findings of failure   2. The right lung and left upper lobe are clear. XR HIP 2-3 VW W PELVIS LEFT   Final Result   Transcervical fracture of the left femoral neck. .         XR HIP RIGHT (2-3 VIEWS)   Final Result   1. No evidence of acute osseous abnormality. 2. Right total hip arthroplasty. XR HUMERUS LEFT (MIN 2 VIEWS)   Final Result   Limited study. No grossly displaced fractures. Mild subluxation of the humeral head. XR KNEE RIGHT (1-2 VIEWS)   Final Result   No fracture or dislocation of the right knee. XR KNEE LEFT (1-2 VIEWS)   Final Result   Satisfactory alignment of left knee arthroplasty. No periprosthetic fracture   or evidence of loosening. XR FEMUR LEFT (MIN 2 VIEWS)   Final Result   Transcervical fracture of the left femoral neck. .             EKG:  This EKG is signed and interpreted by me. Rate: 101bpm  Rhythm: sinus  Interpretation: Leftward axis. LVH. Inverted T waves laterally. No ST segment elevation. Comparison: stable as compared to patient's most recent EKG      ------------------------- NURSING NOTES AND VITALS REVIEWED ---------------------------  Date / Time Roomed:  7/7/2021  2:30 PM  ED Bed Assignment:  09/09    The nursing notes within the ED encounter and vital signs as below have been reviewed.    Patient Vitals for the past 24 hrs:   BP Temp Temp src Pulse Resp SpO2   07/07/21 1757 (!) 182/84 -- -- 106 16 92 %   07/07/21 1637 (!) 194/100 -- -- 108 16 95 %   07/07/21 1503 (!) 147/70 98.2 °F (36.8 °C) Oral 112 16 94 %       Oxygen Saturation Interpretation: Normal      ------------------------------------------ PROGRESS NOTES ------------------------------------------    I have spoken with the patient and discussed todays results, in addition to providing specific details for the plan of care and counseling regarding the diagnosis and prognosis. Their questions are answered at this time and they are agreeable with the plan. I have discussed the risks and benefits of transfer and they wish to proceed with the transfer. --------------------------------- ADDITIONAL PROVIDER NOTES ---------------------------------  Consultations:  Spoke with Dr. Evelyn Pereira (Orthopedics). Discussed case. They will provide consultation. Spoke with April np working with Dr. Leda Suarez (Medicine). Discussed case. They will admit this patient. Reason for transfer: need for higher level of care, orthopedic evaluation and neurology. This patient's ED course included: a personal history and physicial examination, re-evaluation prior to disposition, multiple bedside re-evaluations, IV medications, cardiac monitoring and continuous pulse oximetry    This patient has remained hemodynamically stable during their ED course. Please note that the withdrawal or failure to initiate urgent interventions for this patient would likely result in a life threatening deterioration or permanent disability. Clinical Impression  1. Closed fracture of left hip, initial encounter ([facility] [address]    2. History of hemorrhagic cerebrovascular accident (CVA) with residual deficit    3. Prolonged QT interval          Disposition  Patient's disposition: Transfer to [address]. Transferred by: ground. Patient's condition is stable.       Etienne DO Marisol  Resident  07/07/21 7693

## 2021-07-07 NOTE — ED NOTES
Access [facility] called with bed assignment for [address] [facility] 6513 A. Nurse to Nurse to be called at [phone]. Transport needs expressed. Will call back with VINCENZO Beaulieu RN  07/07/21 0815

## 2021-07-07 NOTE — LETTER
[address] [place] [address] Dr Department Medicaid  CERTIFICATION OF NECESSITY  FOR NON-EMERGENCY TRANSPORTATION   BY GROUND AMBULANCE      Individual Information   1. Name: Anitha Conrad 2. [address] [place] [address] Dr Medicaid Billing Number:    3. Address: [address] [place] [address]      Transportation Provider Information   4. Provider Name: PAS    5. [address] [place] [address] Dr Medicaid Provider Number:  National Provider Identifier (NPI):      Certification  7. Criteria:  During transport, this individual requires:  [x] Medical treatment or continuous     supervision by an EMT. [] The administration or regulation of oxygen by another person. [] Supervised protective restraint. 2021   9. Length  [x] Not more than 10 day(s)  [] One Year     Additional Information Relevant to Certification   10. Comments or Explanations, If Necessary or Appropriate     S/P LEFT HIP HEMIARTHROPLASTY, REQUIRES SPECIAL POSITIONING      Certifying Practitioner Information   11. Name of Practitioner: Maira Charles M.D.    12. [facility] Provider Number, If Applicable:  [address] Provider Identifier (NPI):      Signature Information   14. Date of Signature: 2021 15. Name of Person Signing: Jez Robb   12. Signature and Professional Designation: Electronically signed by NGOZI De Jesus on 2021 at 2:29 PM       [facility] [id number]  Rev. 2015    I Private Enc? No Unit [facility] 6510/6510-A    Hospital Service: Med/Surg   Encounter DX: Closed left hip fracture*   ADM Provider: Ruthy Pettit MD   Procedure:     ATT Provider: Ruthy Pettit MD   REF Provider: Tala Deleon      Admission DX: Closed left hip fracture, initial encounter [facility]) and DX codes: S00.131Y      PATIENT                 Name: Anitha Conrad : 1926 (89 yrs)   Address: [address] [place] [address] Sex: Female   City: [address] [place] [address]         Marital Status:     Employer: RETIRED         Baptist: Christianity   Primary Care Provider: Alexandra Smith MD         Primary Phone: [phone]   EMERGENCY CONTACT   Contact Name Legal Guardian? Relationship to Patient Home Phone Work Phone   1. RayTeresa  2. RayFelipe No  No Child  Other [phone]              GUARANTOR            Guarantor: Flor Gutiérrez     : 1926   Address: [address] [place] Sex: Female   [address] [facility]     Relation to Patient: Self       Home Phone: [phone]   Guarantor ID: [id number]       Work Phone:     Guarantor Employer: RETIRED         Status: RETIRED      COVERAGE        PRIMARY INSURANCE   Payor: MEDICARE Plan: MEDICARE PART A AND B   Payor Address: [facility] [address],  [facility], [facility] [address]       Group Number:   Insurance Type: INDEMNITY   Subscriber Name: Ivonne Santos : 1926   Subscriber ID: [id number] Radames Dang. Rel. to Sub: Self   SECONDARY INSURANCE   Payor: UNITED HEALTHCARE Plan: Noquo Address:  [facility] [id number], [place], [place] [address]          Group Number: Plan F Insurance Type: INDEMNITY   Subscriber Name: Ivonne Santos : 1926   Subscriber ID: [id number] Pat.  Rel. to Sub: SELF

## 2021-07-07 NOTE — LETTER
Beneficiary Notification Letter  BPCI Advanced     Your Doctor or [address],    We wanted to let you know that your health care provider, [address] [place] [address] Angelica, has volunteered to take part in our [facility] for [facility] & Medicaid Services (CMS) Bundled Payments for [facility] (BPCI Advanced). This doesnt change your Medicare rights or benefits and you dont need to do anything. What are bundled payments? A bundled payment combines, or bundles together, payments that Medicare makes to your health care providers for the many different kinds of medical services you might get in a specific time period. In BPCI Advanced, this time period could include a hospital inpatient stay or outpatient procedure, plus 90 days. Why would Medicare bundle payments? Bundled payments are thought of as a value-based way to pay because health care providers are responsible for both the quality and cost of medical care they give. This is a relatively new way of paying health care providers compared to thefee-for-service way Medicare has traditionally paid, where providers are paid separately for each service they provide. Bundled payments encourage these providers to work together to provide better, more coordinated care during your hospital stay, or outpatient procedure, and through your recovery. What does BPCI Advance mean for me? Youre more likely to get even better care when hospitals, doctors, and other health care providers work together. In BPCI Advanced, hospitals, doctors, and other health care providers may be rewarded for providing better, more coordinated health care. Medicare will watch BPCI Advanced participants closely to make sure that you and other patients keep getting efficient, high quality care. What do I need to know about BPCI Advanced?   Whats most important for you to know is that your Medicare rights and benefits wont change because your health care provider is participating in [facility]. Medicare will keep covering all of your medically necessary services. Even though Medicare will pay your doctor in a different way under BPCI Advanced, how much you have to pay wont change. Health care providers and suppliers who are enrolled in Medicare will submit their Medicare claims like they always have. Youll have all the same Medicare rights and protections, including the right to choose which hospital, doctor, or other health care provider you see. If you dont want to get care from a health care provider whos participating in [facility], then youll have to choose a different health care provider whos not participating in the Model. How can I give feedback about my health care? Medicare might ask you to take a voluntary survey about the services and care you received from  [address] during your hospital stay or outpatient procedure and for a specific period of time afterwards. You can decide whether you want to take the voluntary survey, but if you do, itll help Medicare make BPCI Advanced and the care of other Medicare patients better. If you have concerns or complaints about your care, you can:   · Talk to your doctor or health care provider. · Contact your Beneficiary and Family Centered Care Quality Improvement   Organization MELLY RASCON [facility]). You can get your BFCC-QIOs phone number  at  Medicare.gov/contacts or by calling 1-800-MEDICARE. TTY users can call  7-560.865.1863. Where can I learn more about BPCI Advanced? Learn more about BPCI Advanced at https://innovation.cms.gov/initiatives/bpci-advanced/:  · A list of all the hospitals and physician group practices in the country participating in [address] [place] [address]. · All of the inpatient and outpatient Clinical Episodes that are currently included under BPCI Advanced.  A Clinical Episode is a grouping of medical conditions or diagnoses that are included in the [address].

## 2021-07-08 ENCOUNTER — PREP FOR PROCEDURE (OUTPATIENT)
Dept: ORTHOPEDIC SURGERY | Age: 86
End: 2021-07-08

## 2021-07-08 ENCOUNTER — ANESTHESIA EVENT (OUTPATIENT)
Dept: OPERATING ROOM | Age: 86
DRG: 522 | End: 2021-07-08
Payer: MEDICARE

## 2021-07-08 DIAGNOSIS — Z86.73 HISTORY OF CVA (CEREBROVASCULAR ACCIDENT): ICD-10-CM

## 2021-07-08 DIAGNOSIS — S72.032A: Primary | ICD-10-CM

## 2021-07-08 DIAGNOSIS — Z01.818 PRE-OP TESTING: ICD-10-CM

## 2021-07-08 LAB
ALBUMIN SERPL-MCNC: 3.2 G/DL (ref 3.5–5.2)
ALP BLD-CCNC: 96 U/L (ref 35–104)
ALT SERPL-CCNC: 13 U/L (ref 0–32)
AMORPHOUS: ABNORMAL
ANION GAP SERPL CALCULATED.3IONS-SCNC: 9 MMOL/L (ref 7–16)
APTT: 29.2 SEC (ref 24.5–35.1)
AST SERPL-CCNC: 17 U/L (ref 0–31)
BACTERIA: ABNORMAL /HPF
BILIRUB SERPL-MCNC: 0.9 MG/DL (ref 0–1.2)
BILIRUBIN URINE: NEGATIVE
BLOOD, URINE: NEGATIVE
BUN BLDV-MCNC: 16 MG/DL (ref 6–23)
CALCIUM SERPL-MCNC: 9.8 MG/DL (ref 8.6–10.2)
CHLORIDE BLD-SCNC: 103 MMOL/L (ref 98–107)
CLARITY: ABNORMAL
CO2: 27 MMOL/L (ref 22–29)
COLOR: YELLOW
CREAT SERPL-MCNC: 0.8 MG/DL (ref 0.5–1)
EKG ATRIAL RATE: 100 BPM
EKG P AXIS: 67 DEGREES
EKG P-R INTERVAL: 146 MS
EKG Q-T INTERVAL: 380 MS
EKG QRS DURATION: 88 MS
EKG QTC CALCULATION (BAZETT): 490 MS
EKG R AXIS: -27 DEGREES
EKG T AXIS: -140 DEGREES
EKG VENTRICULAR RATE: 100 BPM
GFR AFRICAN AMERICAN: >60
GFR NON-AFRICAN AMERICAN: >60 ML/MIN/1.73
GLUCOSE BLD-MCNC: 136 MG/DL (ref 74–99)
GLUCOSE URINE: NEGATIVE MG/DL
HCT VFR BLD CALC: 44.3 % (ref 34–48)
HEMOGLOBIN: 14.8 G/DL (ref 11.5–15.5)
INR BLD: 1.2
KETONES, URINE: NEGATIVE MG/DL
LEUKOCYTE ESTERASE, URINE: NEGATIVE
MAGNESIUM: 2.1 MG/DL (ref 1.6–2.6)
MCH RBC QN AUTO: 27.6 PG (ref 26–35)
MCHC RBC AUTO-ENTMCNC: 33.4 % (ref 32–34.5)
MCV RBC AUTO: 82.5 FL (ref 80–99.9)
NITRITE, URINE: NEGATIVE
PDW BLD-RTO: 14.1 FL (ref 11.5–15)
PH UA: 6 (ref 5–9)
PLATELET # BLD: 216 E9/L (ref 130–450)
PMV BLD AUTO: 11 FL (ref 7–12)
POTASSIUM REFLEX MAGNESIUM: 3.5 MMOL/L (ref 3.5–5)
POTASSIUM SERPL-SCNC: 3.1 MMOL/L (ref 3.5–5)
PROTEIN UA: NEGATIVE MG/DL
PROTHROMBIN TIME: 13.3 SEC (ref 9.3–12.4)
RBC # BLD: 5.37 E12/L (ref 3.5–5.5)
RBC UA: ABNORMAL /HPF (ref 0–2)
SODIUM BLD-SCNC: 139 MMOL/L (ref 132–146)
SPECIFIC GRAVITY UA: 1.02 (ref 1–1.03)
TOTAL PROTEIN: 6.1 G/DL (ref 6.4–8.3)
UROBILINOGEN, URINE: 0.2 E.U./DL
WBC # BLD: 12.3 E9/L (ref 4.5–11.5)
WBC UA: ABNORMAL /HPF (ref 0–5)

## 2021-07-08 PROCEDURE — 6370000000 HC RX 637 (ALT 250 FOR IP): Performed by: NURSE PRACTITIONER

## 2021-07-08 PROCEDURE — 81001 URINALYSIS AUTO W/SCOPE: CPT

## 2021-07-08 PROCEDURE — 80053 COMPREHEN METABOLIC PANEL: CPT

## 2021-07-08 PROCEDURE — 2700000000 HC OXYGEN THERAPY PER DAY

## 2021-07-08 PROCEDURE — 2140000000 HC CCU INTERMEDIATE R&B

## 2021-07-08 PROCEDURE — 6370000000 HC RX 637 (ALT 250 FOR IP): Performed by: ORTHOPAEDIC SURGERY

## 2021-07-08 PROCEDURE — 2580000003 HC RX 258: Performed by: NURSE PRACTITIONER

## 2021-07-08 PROCEDURE — 85027 COMPLETE CBC AUTOMATED: CPT

## 2021-07-08 PROCEDURE — 36415 COLL VENOUS BLD VENIPUNCTURE: CPT

## 2021-07-08 PROCEDURE — 6370000000 HC RX 637 (ALT 250 FOR IP): Performed by: FAMILY MEDICINE

## 2021-07-08 PROCEDURE — 87088 URINE BACTERIA CULTURE: CPT

## 2021-07-08 PROCEDURE — 2580000003 HC RX 258: Performed by: PHYSICIAN ASSISTANT

## 2021-07-08 PROCEDURE — 92610 EVALUATE SWALLOWING FUNCTION: CPT | Performed by: SPEECH-LANGUAGE PATHOLOGIST

## 2021-07-08 PROCEDURE — 6360000002 HC RX W HCPCS: Performed by: NURSE PRACTITIONER

## 2021-07-08 PROCEDURE — 92526 ORAL FUNCTION THERAPY: CPT | Performed by: SPEECH-LANGUAGE PATHOLOGIST

## 2021-07-08 PROCEDURE — 83735 ASSAY OF MAGNESIUM: CPT

## 2021-07-08 RX ORDER — POTASSIUM CHLORIDE 7.45 MG/ML
10 INJECTION INTRAVENOUS
Status: COMPLETED | OUTPATIENT
Start: 2021-07-08 | End: 2021-07-08

## 2021-07-08 RX ORDER — HYDROCODONE BITARTRATE AND ACETAMINOPHEN 5; 325 MG/1; MG/1
1 TABLET ORAL EVERY 6 HOURS PRN
Status: DISCONTINUED | OUTPATIENT
Start: 2021-07-08 | End: 2021-07-10

## 2021-07-08 RX ORDER — SODIUM CHLORIDE 0.9 % (FLUSH) 0.9 %
10 SYRINGE (ML) INJECTION EVERY 12 HOURS SCHEDULED
Status: DISCONTINUED | OUTPATIENT
Start: 2021-07-08 | End: 2021-07-09

## 2021-07-08 RX ORDER — SODIUM CHLORIDE 9 MG/ML
25 INJECTION, SOLUTION INTRAVENOUS PRN
Status: CANCELLED | OUTPATIENT
Start: 2021-07-08

## 2021-07-08 RX ORDER — SODIUM CHLORIDE 0.9 % (FLUSH) 0.9 %
10 SYRINGE (ML) INJECTION EVERY 12 HOURS SCHEDULED
Status: CANCELLED | OUTPATIENT
Start: 2021-07-08

## 2021-07-08 RX ORDER — SODIUM CHLORIDE 9 MG/ML
25 INJECTION, SOLUTION INTRAVENOUS PRN
Status: DISCONTINUED | OUTPATIENT
Start: 2021-07-08 | End: 2021-07-09

## 2021-07-08 RX ORDER — SODIUM CHLORIDE 0.9 % (FLUSH) 0.9 %
10 SYRINGE (ML) INJECTION PRN
Status: DISCONTINUED | OUTPATIENT
Start: 2021-07-08 | End: 2021-07-09

## 2021-07-08 RX ORDER — SODIUM CHLORIDE 0.9 % (FLUSH) 0.9 %
10 SYRINGE (ML) INJECTION PRN
Status: CANCELLED | OUTPATIENT
Start: 2021-07-08

## 2021-07-08 RX ADMIN — ACETAMINOPHEN 650 MG: 325 TABLET ORAL at 23:51

## 2021-07-08 RX ADMIN — Medication 10 ML: at 09:45

## 2021-07-08 RX ADMIN — POTASSIUM CHLORIDE 10 MEQ: 10 INJECTION, SOLUTION INTRAVENOUS at 04:10

## 2021-07-08 RX ADMIN — ESCITALOPRAM 5 MG: 10 TABLET, FILM COATED ORAL at 11:17

## 2021-07-08 RX ADMIN — BISACODYL 5 MG: 5 TABLET, COATED ORAL at 11:21

## 2021-07-08 RX ADMIN — PSYLLIUM HUSK 1 PACKET: 3.4 GRANULE ORAL at 11:17

## 2021-07-08 RX ADMIN — ACETAMINOPHEN 650 MG: 325 TABLET ORAL at 11:21

## 2021-07-08 RX ADMIN — AMLODIPINE BESYLATE 5 MG: 5 TABLET ORAL at 11:17

## 2021-07-08 RX ADMIN — POTASSIUM CHLORIDE 10 MEQ: 10 INJECTION, SOLUTION INTRAVENOUS at 07:01

## 2021-07-08 RX ADMIN — MORPHINE SULFATE 2 MG: 2 INJECTION, SOLUTION INTRAMUSCULAR; INTRAVENOUS at 03:55

## 2021-07-08 RX ADMIN — OXYCODONE HYDROCHLORIDE 10 MG: 10 TABLET ORAL at 11:21

## 2021-07-08 RX ADMIN — Medication 10 ML: at 00:47

## 2021-07-08 RX ADMIN — POTASSIUM CHLORIDE 10 MEQ: 10 INJECTION, SOLUTION INTRAVENOUS at 05:42

## 2021-07-08 RX ADMIN — SODIUM CHLORIDE, PRESERVATIVE FREE 10 ML: 5 INJECTION INTRAVENOUS at 22:31

## 2021-07-08 RX ADMIN — POTASSIUM CHLORIDE 10 MEQ: 10 INJECTION, SOLUTION INTRAVENOUS at 03:19

## 2021-07-08 RX ADMIN — Medication 10 ML: at 22:32

## 2021-07-08 RX ADMIN — HYDROCODONE BITARTRATE AND ACETAMINOPHEN 1 TABLET: 5; 325 TABLET ORAL at 21:11

## 2021-07-08 RX ADMIN — SODIUM CHLORIDE, PRESERVATIVE FREE 10 ML: 5 INJECTION INTRAVENOUS at 03:20

## 2021-07-08 RX ADMIN — ACETAMINOPHEN 650 MG: 325 TABLET ORAL at 00:47

## 2021-07-08 ASSESSMENT — PAIN DESCRIPTION - ORIENTATION
ORIENTATION: LEFT
ORIENTATION: LEFT

## 2021-07-08 ASSESSMENT — PAIN SCALES - GENERAL
PAINLEVEL_OUTOF10: 10
PAINLEVEL_OUTOF10: 6
PAINLEVEL_OUTOF10: 7
PAINLEVEL_OUTOF10: 5
PAINLEVEL_OUTOF10: 5
PAINLEVEL_OUTOF10: 0
PAINLEVEL_OUTOF10: 6
PAINLEVEL_OUTOF10: 0

## 2021-07-08 ASSESSMENT — PAIN DESCRIPTION - PAIN TYPE
TYPE: ACUTE PAIN
TYPE: ACUTE PAIN

## 2021-07-08 ASSESSMENT — PAIN DESCRIPTION - DESCRIPTORS
DESCRIPTORS: PATIENT UNABLE TO DESCRIBE
DESCRIPTORS: DISCOMFORT

## 2021-07-08 ASSESSMENT — PAIN DESCRIPTION - ONSET
ONSET: UNABLE TO TELL
ONSET: UNABLE TO TELL

## 2021-07-08 ASSESSMENT — PAIN DESCRIPTION - FREQUENCY
FREQUENCY: INTERMITTENT
FREQUENCY: INTERMITTENT

## 2021-07-08 ASSESSMENT — PAIN DESCRIPTION - LOCATION
LOCATION: HIP
LOCATION: HIP

## 2021-07-08 ASSESSMENT — PAIN DESCRIPTION - PROGRESSION: CLINICAL_PROGRESSION: NOT CHANGED

## 2021-07-08 NOTE — PATIENT CARE CONFERENCE
[facility] Quality Flow/Interdisciplinary Rounds Progress Note        Quality Flow Rounds held on July 8, 2021    Disciplines Attending:  Bedside Nurse, ,  and Nursing Unit Leadership    Lazaro Stockton was admitted on 7/7/2021 10:17 PM    Anticipated Discharge Date:  Expected Discharge Date: 07/12/21    Disposition:    Lance Score:  Lance Scale Score: 13    Readmission Risk              Risk of Unplanned Readmission:  13           Discussed patient goal for the day, patient clinical progression, and barriers to discharge.   The following Goal(s) of the Day/Commitment(s) have been identified:  to have patient ready for surgery today      Aleks Scott RN  July 8, 2021

## 2021-07-08 NOTE — DISCHARGE INSTR - COC
Continuity of Care Form    Patient Name: Reji Mcdonald   :  1926  MRN:  [id number]    Admit date:  2021  Discharge date:  21    Code Status Order: DNR-CCA   Advance Directives:     Admitting Physician:  Charmaine Bliss MD  PCP: Sarah Isabel MD    Discharging Nurse: [facility] Unit/Room#: 4408/2885-V  Discharging Unit Phone Number: [phone]    Emergency Contact:   Extended Emergency Contact Information  Primary Emergency Contact: Teresa Bell  Address: [address], [address] [employer] [place] [address] Phone: [phone]  Mobile Phone: [phone]  Relation: Child  Preferred language: English   needed? No  Secondary Emergency Contact: RayFelipe  Address: [address], [address] [employer] of [address] Phone: [phone]  Mobile Phone: [phone]  Relation: Other  Preferred language: English   needed? No    Past Surgical History:  Past Surgical History:   Procedure Laterality Date    APPENDECTOMY      BLADDER REPAIR      CATARACT REMOVAL      EYE SURGERY      cataracts    HYSTERECTOMY      JOINT REPLACEMENT      L total knee, R total hip        Immunization History: There is no immunization history on file for this patient. Active Problems:  Patient Active Problem List   Diagnosis Code    Bilateral carotid artery stenosis I65.23    Cerebrovascular accident (CVA) ([facility] [address] I63.9    Closed left hip fracture, initial encounter ([facility] [address] S72.002A    Transcervical fracture of femur ([facility] [address] [facility] S72.033A    History of CVA (cerebrovascular accident) Z86.73    HTN (hypertension) I10    Fall W19. XXXA    Cerebral parenchymal hemorrhage (HCC) I61.9    Hypokalemia E87.6    Leukocytosis D72.829    Subluxation T14. 8XXA       Isolation/Infection:   Isolation          No Isolation        Patient Infection Status     Infection Onset Added Last Indicated Last Indicated By Review Planned Expiration Resolved Resolved By    None active    Resolved    COVID-19 Rule Out 06/29/21 06/29/21 06/29/21 COVID-19, Rapid (Ordered)   06/29/21 Rule-Out Test Resulted          Nurse Assessment:  Last Vital Signs: BP (!) 145/78   Pulse 83   Temp 98.7 °F (37.1 °C) (Temporal)   Resp 17   Ht 5' 1\" (1.549 m)   Wt 140 lb 9.6 oz (63.8 kg)   SpO2 95%   BMI 26.57 kg/m²     Last documented pain score (0-10 scale): Pain Level: 7  Last Weight:   Wt Readings from Last 1 Encounters:   07/08/21 140 lb 9.6 oz (63.8 kg)     Mental Status:  disoriented    IV Access:  - None    Nursing Mobility/ADLs:  Walking   Dependent  Transfer  Dependent  Bathing  Dependent  Dressing  Dependent  Toileting  Dependent  Feeding  Dependent  Med Admin  Dependent  Med Delivery   crushed and prefers mixed with applesauce/pudding    Wound Care Documentation and Therapy:        Elimination:  Continence:   · Bowel: No  · Bladder: No  Urinary Catheter: Insertion Date: 7/9/21   Colostomy/Ileostomy/Ileal Conduit: No       Date of Last BM: 7/2/21 (patient is a poor historian) (Sage Mcbride is aware)    Intake/Output Summary (Last 24 hours) at 7/8/2021 1303  Last data filed at 7/8/2021 0605  Gross per 24 hour   Intake    Output 150 ml   Net -150 ml     I/O last 3 completed shifts:  In: -   Out: 150 [Urine:150]    Safety Concerns:     Sundowners [place], History of Falls (last 30 days) and At Risk for Falls    Impairments/Disabilities:      Speech and Hearing    Nutrition Therapy:  Current Nutrition Therapy:   - Oral Diet:  General    Routes of Feeding: Oral  Liquids: Thin Liquids  Daily Fluid Restriction: no  Last Modified Barium Swallow with Video (Video Swallowing Test): not done    Treatments at the Time of Hospital Discharge:   Respiratory Treatments: NA  Oxygen Therapy:  is not on home oxygen therapy.   Ventilator:    - No ventilator support    Rehab Therapies: Physical Therapy, Occupational Therapy and Speech/Language Therapy  Weight Bearing Status/Restrictions: No weight bearing restirctions  Other Medical Equipment (for information only, NOT a DME order):  hospital bed  Other Treatments: NA    Patient's personal belongings (please select all that are sent with patient):  Hearing Aides bilateral    RN SIGNATURE:  Electronically signed by Dorcas Bradford RN on 7/11/21 at 1:41 PM EDT    CASE MANAGEMENT/SOCIAL WORK SECTION    Inpatient Status Date: 07/07/21    Readmission Risk Assessment Score:  Readmission Risk              Risk of Unplanned Readmission:  13           Discharging to Facility/ Agency   · Name: [facility]   · Address:  · Phone:  · Fax:    Dialysis Facility (if applicable)   · Name:  · Address:  · Dialysis Schedule:  · Phone:  · Fax:    / signature:  Josefina Vasques RN     PHYSICIAN SECTION    Prognosis: {Prognosis:0751476227}    Condition at Discharge: [facility] Patient Condition:[id number]}    Rehab Potential (if transferring to Rehab): {Prognosis:3053609181}    Recommended Labs or Other Treatments After Discharge: ***    Physician Certification: I certify the above information and transfer of Karolina Barraza  is necessary for the continuing treatment of the diagnosis listed and that she requires [place] for less 30 days.      Update Admission H&P: {CHP DME Changes in AFZGQ:047631640}    PHYSICIAN SIGNATURE:  {Esignature:[id number]}

## 2021-07-08 NOTE — PROGRESS NOTES
SPEECH/LANGUAGE PATHOLOGY  CLINICAL ASSESSMENT OF SWALLOWING FUNCTION   and PLAN OF CARE    PATIENT NAME:  Alina Wilson  (female)     MRN:  [id number]    :  1926  (80 y.o.)  STATUS:  Inpatient: Room 65/6510-A    TODAY'S DATE:  21 2315   SLP swallowing-dysphagia evaluation and treatment Start: 21, End: 21, ONE TIME, Standing Count: 1 Occurrences, R    Kamala Lux APRN - CNP    REASON FOR REFERRAL: dysphagia   EVALUATING THERAPIST: DARRIUS Sutherland                 RESULTS:    DYSPHAGIA DIAGNOSIS:   Clinical indicators of swallow within functional limits for age/premorbid functioning      DIET RECOMMENDATIONS:  Soft and bite size consistency solids (dysphagia 3) with  thin liquids     FEEDING RECOMMENDATIONS:     Assistance level:  Stand by assistance is needed during all oral intake, Set-up is required for all oral intake      Compensatory strategies recommended: Small bites/sips and Alternate solids and liquids      Discussed recommendations with nursing and/or faxed report to referring provider: No secondary to no diet/liquid change recommended     SPEECH THERAPY  PLAN OF CARE   The dysphagia POC is established based on physician order, dysphagia diagnosis and results of clinical assessment     Meal time assessment for 1-2 sessions to provide diet modification to least restrictive deit    Conditions Requiring Skilled Therapeutic Intervention for dysphagia:    very slow mastication with eventual recollection and adequate bolus formation    Specific dysphagia interventions to include:     Trials of upgraded diet/liquid     Specific instructions for next treatment:  ongoing PO analysis to upgrade diet and evaluate tolerance of current PO recommendation  Patient Treatment Goals:    Short Term Goals:  Pt will complete PO trials of upgraded diet textures with SLP only to determine the least restrictive PO diet to maintain adequate nutrition/hydration with no completed education regarding results of evaluation and that intervention is warranted at this time. Learner: Patient  Education: Reviewed diet and strategies  Evaluation of Education:  No evidence of learning    This plan may be re-evaluated and revised as warranted. Evaluation Time includes thorough review of current medical information, gathering information on past medical history/social history and prior level of function, completion of standardized testing/informal observation of tasks, assessment of data and education on plan of care and goals. [x]The admitting diagnosis and active problem list, have been reviewed prior to initiation of this evaluation. ACTIVE PROBLEM LIST:   Patient Active Problem List   Diagnosis    Bilateral carotid artery stenosis    [facility]    Closed left hip fracture, initial encounter ([facility]    Transcervical fracture of femur ([facility] [facility]    History of CVA (cerebrovascular accident)    HTN (hypertension)    Fall    Cerebral parenchymal hemorrhage (HCC)    Hypokalemia    Leukocytosis    Subluxation         CPT code:  89609  bedside swallow eval / 38544 dysphagia therapy    Pt's use of appropriate safe swallow strategies assessed. Adequate ability to maintain attention to task, demonstrate good use of pacing and bolus size, as well as demonstrated no fatigue during ongoing PO trials.      Jose Eduardo Teran M.S., [address] Pathologist  [id number]  7/8/2021

## 2021-07-08 NOTE — ED NOTES
Report called to [facility] at [facility] [employer] [facility]  07/07/21 2121

## 2021-07-08 NOTE — H&P
[address] [facility]  History and Physical      CHIEF COMPLAINT: Follow-up facility       Patient of Mathieu Rsoe MD presents with:  Closed left hip fracture, initial encounter [facility])    History of Present Illness:   Patient is a 77-year-old female with a past medical history of CVA, hyperlipidemia, dementia, hypertension, and blood clots. Patient presented to the ED via EMS for mechanical fall at facility. Patient is confused information obtained through EHR. Patient with mechanical fall at facility sustaining a left hip fracture. Patient was just recently discharged on 7/3/2021 for CVA. She was admitted to medical intensive for post IV TPA for an acute ischemic stroke. Patient is currently not on any blood thinners. Patient is alert on examination denies chest pain, shortness of breath, fever, chills. Patient was admitted to telemetry floor, and orthopedic surgery is following. REVIEW OF SYSTEMS:  Pertinent negatives are above in HPI. 10 point ROS otherwise negative.       Past Medical History:   Diagnosis Date    Cerebral artery occlusion with cerebral infarction ([address]     Hx of blood clots     Hyperlipidemia     Hypertension          Past Surgical History:   Procedure Laterality Date    APPENDECTOMY      BLADDER REPAIR      CATARACT REMOVAL      EYE SURGERY      cataracts    HYSTERECTOMY      JOINT REPLACEMENT      L total knee, R total hip        Medications Prior to Admission:    Medications Prior to Admission: polyethylene glycol (GLYCOLAX) 17 g packet, Take 17 g by mouth daily as needed for Constipation  bisacodyl (DULCOLAX) 5 MG EC tablet, Take 5 mg by mouth daily as needed for Constipation  psyllium (KONSYL) 28.3 % PACK, Take 1 packet by mouth daily  escitalopram (LEXAPRO) 5 MG tablet, TAKE ONE TABLET BY MOUTH ONCE A DAY  amLODIPine (NORVASC) 5 MG tablet, Take 5 mg by mouth as needed     Note that the patient's home medications were reviewed and the above list is accurate to the best of my knowledge at the time of the exam.    Allergies:    Lisinopril, Statins, and Ciprofloxacin    Social History:    reports that she has never smoked. She has never used smokeless tobacco. She reports that she does not drink alcohol and does not use drugs. Family History:   Unable to obtain at this time      PHYSICAL EXAM:    Vitals:  BP (!) 157/76   Pulse 80   Temp 96.9 °F (36.1 °C) (Temporal)   Resp 18   Ht 5' 1\" (1.549 m)   Wt 140 lb 9.6 oz (63.8 kg)   SpO2 96%   BMI 26.57 kg/m²       General appearance: NAD, conversant, ill appearing  Eyes: Sclerae anicteric, PERRLA  HEENT: AT/NC, MMM  Neck: FROM, supple, no thyromegaly  Lymph: No cervical / supraclavicular lymphadenopathy  Lungs: Clear to auscultation, WOB normal  CV: RRR, no MRGs, no lower extremity edema  Abdomen: Soft, non-tender; no masses or HSM, +BS  Extremities: FROM without synovitis. No clubbing or cyanosis of the hands. Left lower extremity shortened and externally rotated. Tenderness  Skin: no rash, induration, lesions, or ulcers  Psych: Calm and cooperative. Normal judgement and insight. Normal mood and affect. Neuro: Alert and interactive, face symmetric, speech fluent. LABS:  All labs reviewed.   Of note:  CBC with Differential:    Lab Results   Component Value Date    WBC 12.3 07/08/2021    RBC 5.37 07/08/2021    HGB 14.8 07/08/2021    HCT 44.3 07/08/2021     07/08/2021    MCV 82.5 07/08/2021    MCH 27.6 07/08/2021    MCHC 33.4 07/08/2021    RDW 14.1 07/08/2021    LYMPHOPCT 5.0 07/07/2021    MONOPCT 5.6 07/07/2021    BASOPCT 0.1 07/07/2021    MONOSABS 0.90 07/07/2021    LYMPHSABS 0.79 07/07/2021    EOSABS 0.01 07/07/2021    BASOSABS 0.02 07/07/2021     CMP:    Lab Results   Component Value Date     07/08/2021    K 3.5 07/08/2021     07/08/2021    CO2 27 07/08/2021    BUN 16 07/08/2021    CREATININE 0.8 07/08/2021    GFRAA >60 07/08/2021    LABGLOM >60 07/08/2021    GLUCOSE 136 07/08/2021    PROT 6.1 07/08/2021    LABALBU 3.2 07/08/2021    CALCIUM 9.8 07/08/2021    BILITOT 0.9 07/08/2021    ALKPHOS 96 07/08/2021    AST 17 07/08/2021    ALT 13 07/08/2021       Imaging:  CT head: No skull fracture or acute intracranial abnormality. Bilateral parenchymal hemorrhages, mostly prominent in the left parietal lobe. The subarachnoid blood identified on previous MRI is not evident on the current study. CT cervical: No fracture or joint dislocation is seen. Degenerative changes. CXR: There are no findings of failure. The right lung and left upper lobe are clear. X-ray right hip: No evidence of acute osseous abnormality. Right total hip arthroplasty. X-ray left femur: Transcervical fracture of the left femoral neck. X-ray left knee: Satisfactory alignment of the left knee arthroplasty. No.  Prosthetic fracture evidence of loosening. X-ray left humerus: Mild subluxation of the humeral head. EKG:  I've personally reviewed the patient's EKG:  Normal sinus rhythm    Telemetry:  I've personally reviewed the patient's telemetry:  Normal sinus rhythm    ASSESSMENT/PLAN:  Principal Problem:    Closed left hip fracture, initial encounter [facility])  Active Problems:    Cerebrovascular accident (CVA) ([address]  Resolved Problems:    * No resolved hospital problems. *    Patient is a 60-year-old female recently discharged with an acute CVA and discharged to subacute rehab where she had a fall and sustained a    Closed left hip fracture  -Pain management  -Orthopedic surgery following  -Keep patient NPOfor surgical fixation 7/9  -Hold all anticoagulants  -Telemetry monitoring    Medication for other comorbidities continue as appropriate dose adjustment as necessary. DVT prophylaxis  PT OT  Discharge planning  Case discussed with attending and agreed upon plan of care.     Code status: DNR CCA  Requires inpatient level of care  NASIM Mcgee CNP    11:39 AM  7/8/2021     Patient known to me from previous admission at which point she had an acute ischemic stroke requiring IV TPA  Subsequently had significant hemorrhagic conversion, she was at that time a DNR comfort care  However she then became more lucid, verbal following commands etc.  CODE STATUS was then changed to DNR CCA  She was discharged to facility, apparently sustained a fall and had a left hip fracture there  On my evaluation she is essentially at her baseline post stroke  Appears uncomfortable from left hip pain  From a medical standpoint she is okay to proceed to orthopedic surgery for left hip repair tomorrow  No active chest pain or shortness of breath  Head CT is stable  Caution with chemical DVT prophylaxis post surgery secondary to recent brain bleed from IV TPA    I personally saw, examined and provided care for the patient. Radiographs, labs and medication list were reviewed by me independently. The case was discussed in detail and plans for care were established. Review of [address] [place] [address], documentation was conducted and revisions were made as appropriate directly by me. I agree with the above documented exam, problem list, and plan of care.      Hank Sy MD  1:44 PM  7/8/2021

## 2021-07-08 NOTE — PROGRESS NOTES
Per physician ambulance, patient lost hearing aid at [facility]. Per daughter, Christi Abdullahi, patient may have left hearing aid at nursing facility. No hearing aids upon arrival, linens on bed searched. Daughter, Christi Abdullahi, helped this RN complete admission data base. Bedside swallow completed and patient passed. Vitals and pain level stable. Patient resting comfortably in bed at this time.

## 2021-07-08 NOTE — PROGRESS NOTES
Department of Orthopedic Surgery  Resident Progress Note    Patient seen and examined. Pain controlled. No new complaints. Sleeping comfortably in bed this morning. No acute overnight events. VITALS:  /67   Pulse 80   Temp 96.9 °F (36.1 °C) (Temporal)   Resp 18   Ht 5' 1\" (1.549 m)   Wt 140 lb 9.6 oz (63.8 kg)   SpO2 96%   BMI 26.57 kg/m²     General: Resting comfortably in bed. MUSCULOSKELETAL:   left lower extremity:  · Lower extremity shortened externally rotated  · Compartments soft and compressible  · +PF/DF/EHL  · +2/4 DP & PT pulses, Brisk Cap refill, Toes warm and perfused  · Distal sensation grossly intact to Peroneals, Sural, Saphenous, and tibial nrs    CBC:   Lab Results   Component Value Date    WBC 12.3 07/08/2021    HGB 14.8 07/08/2021    HCT 44.3 07/08/2021     07/08/2021     PT/INR:    Lab Results   Component Value Date    PROTIME 13.3 07/07/2021    INR 1.2 07/07/2021         ASSESSMENT  · Left minimally placed transcervical femoral neck fracture. PLAN      · Continue physical therapy and protocol: WBAT - LLE  · Discussed with family today treatment options which include operative versus nonoperative treatment for this fracture. Patient and family wish to proceed with surgery will be planned for 7/9  · Patient will need medical assessment before any operative intervention.   · Deep venous thrombosis prophylaxis -per admitting service, early mobilization  · PT/OT  · Pain Control: IV and PO  · Monitor H&H  · discuss with attending

## 2021-07-08 NOTE — CONSULTS
Department of Orthopedic Surgery  Consult [address]        Reason for Consult: Left Hip Pain     HISTORY OF PRESENT ILLNESS:                   Patient is a 80 y.o. female who presents with hip pain after a fall. 7/7/21 while at [facility]. Previous Hip pain -  no. Recently experienced CVA and is 1 week out from TPA and found to have bilateral parenchymal hemorrhages on CT Head today in the ED. The patient is community Ambulator with assist  - walker. Pt lives at rehab SNF. Patient has a significant history of R ARVIND and L TKA. Patient has baseline dementia and accompanied by daughter today. Patient is a poor historian. Daughter states that while at rehab center this morning, the patient was attempting to ambulate with her walker out of reach when she experienced a mechanical fall. Tobacco use:   Social History          Tobacco Use   Smoking Status Never Smoker   Smokeless Tobacco Never Used      Alcohol use: none  Illicit drug use: no history of illicit drug use     Past Medical History:    Past Medical History             Diagnosis Date    Hypertension           Past Surgical History:    Past Surgical History             Procedure Laterality Date    APPENDECTOMY        BLADDER REPAIR        CATARACT REMOVAL        HYSTERECTOMY        JOINT REPLACEMENT             Current Medications:   Current Hospital Medications   Current Facility-Administered Medications: potassium chloride 10 mEq/100 mL IVPB (Peripheral Line), 10 mEq, Intravenous, Once     Allergies:  Ciprofloxacin, Lisinopril, and Statins     Social History:   TOBACCO:   reports that she has never smoked. She has never used smokeless tobacco.  ETOH:   reports no history of alcohol use. DRUGS:   reports no history of drug use. ACTIVITIES OF DAILY LIVING:    OCCUPATION:    Family History:   Family History   No family history on file.         REVIEW OF SYSTEMS:  CONSTITUTIONAL:  negative for  fevers, chills  EYES:  negative for blurred vision, visual disturbance  HEENT:  negative for  hearing loss, voice change  RESPIRATORY:  negative for  dyspnea, wheezing  CARDIOVASCULAR:  negative for  chest pain, palpitations  GASTROINTESTINAL:  negative for nausea, vomiting  GENITOURINARY:  negative for frequency, urinary incontinence  HEMATOLOGIC/LYMPHATIC:  negative for bleeding and petechiae  MUSCULOSKELETAL:  positive for  pain and bone pain  NEUROLOGICAL:  negative for headaches, dizziness  BEHAVIOR/PSYCH:  negative for increased agitation and anxiety     PHYSICAL EXAM:    VITALS:  BP (!) 194/100   Pulse 108   Temp 98.2 °F (36.8 °C) (Oral)   Resp 16   SpO2 95%   CONSTITUTIONAL:  Awake, alert, complaining of L hip pain and appears confused   EYES: Lids and lashes normal, pupils equal, round and reactive to light, extra ocular muscles intact, sclera clear, conjunctiva normal  ENT: Normocephalic, without obvious abnormality, atraumatic, external ears without lesions, oral pharynx with moist mucus membranes  NECK: Trachea midline, skin normal  LUNGS: No increased work of breathing, good air exchange  CARDIOVASCULAR: 2+ pulses throughout and capillary Refill less than 2 seconds  ABDOMEN: soft, non-distended, non-tender and no rebound tenderness or guarding  NEUROLOGIC: Awake, alert  MUSCULOSKELETAL:  Left lower Extremity:  Shortened and externally rotated  +Log roll  + Heel Strike  -TTP over Knee and Ankle  Skin intact with no signs of degloving  Compartments soft and compressible, calf non-tender  +DP & PT pulses, Brisk Cap refill, Toes warm and perfused  Sensation grossly intact superficial/deep peroneal,saphenous,sural,tibial n. distributions  +GS/TA/EHL. Secondary Exam:   bilateralUE: No obvious signs of trauma. -TTP to fingers, hand, wrist, forearm, elbow, humerus, shoulder or clavicle. rightLE: No obvious signs of trauma. -TTP to foot, ankle, leg, knee, thigh, hip.         Pelvis: -TTP, -Log roll, -Heel strike      DATA:    CBC:         Lab Results Component Value Date     WBC 15.9 07/07/2021     RBC 5.87 07/07/2021     HGB 16.1 07/07/2021     HCT 48.2 07/07/2021     MCV 82.1 07/07/2021     MCH 27.4 07/07/2021     MCHC 33.4 07/07/2021     RDW 14.2 07/07/2021      07/07/2021     MPV 10.3 07/07/2021      PT/INR:          Lab Results   Component Value Date     PROTIME 11.8 06/29/2021     INR 1.1 06/29/2021      Lactic Acid :         Lab Results   Component Value Date     LACTA 1.8 05/30/2021         Radiology Review: XR of pelvis and and left hip demonstrates left minimally displaced transcervical femoral neck fracture. Also demonstrates right previous total hip arthroplasty with out fracture or dislocation. XR of the left knee demonstrates previous left total knee arthroplasty with intact implants with no loosening or fracture. XR of the right knee demonstrates degenerative changes but no fractures. XR of the left humerus demonstrates no acute fractures or dislocations. IMPRESSION:   Closed, Left Femoral Neck Fracture     PLAN:  Plan for surgery with Dr. Louise Morrison the morning of 7/9/21  NPO after midnight 7/8/21  Needs medical assessment for surgery  L LE Non-weight bearing  Pre-op Labs and Imaging  Pain control   Treatment consent  Pre op labs and imaging     Reviewed and discuss with Dr. Gail Kline Attending    I have seen and evaluated the patient and agree with the above assessment. I have performed the key components of the history and physical examination and concur completely with the findings as documented. CC:Left hip pain    HPI: This is a 80 y.o. female who presents with hip pain after a fall. 7/7/21 while at [facility]. Previous Hip pain -  no. Recently experienced CVA and is 1 week out from TPA and found to have bilateral parenchymal hemorrhages on CT Head today in the ED. The patient is community Ambulator with assist  - walker. Pt lives at rehab SNF. Patient has a significant history of R ARVIND and L TKA. Patient has baseline dementia and accompanied by daughter today. Patient is a poor historian. Daughter states that while at rehab center this morning, the patient was attempting to ambulate with her walker out of reach when she experienced a mechanical fall. ROS, medications, allergies, past medical/surgical/social/family histories reviewed and as above    PE:  /67   Pulse 80   Temp 96.9 °F (36.1 °C) (Temporal)   Resp 18   Ht 5' 1\" (1.549 m)   Wt 140 lb 9.6 oz (63.8 kg)   SpO2 96%   BMI 26.57 kg/m²   As above    Radiographic Review:  Left transcervical femoral neck fracture, mildly displaced, osteoporotic bone quality. Retained left TKA which appears to be well fixated    ASSESSMENT:  Left femoral neck fracture    PLAN:  We had lengthy discussion with patient and family regarding their diagnosis, typical prognosis, and expected outcomes. We reviewed the possible complications from the injury itself despite treatment chosen. We also discussed treatment options including nonoperative managements versus surgical management, along with risks and benefits of each. Patient and family have elected for surgical management despite associated risks. Medical admit with surgical optimization, anticipate OR 7/9/2021 for left hip hemiarthroplasty  Maintain bedrest, n.p.o. at midnight  We have explained the risks and complications of the recommended surgery with the patient and family at length, as well as discussed potential treatment alternatives including nonoperative management.  These risks include but are not limited to death or complication from anesthesia, continued pain, nerve tendon or vascular injury, infection, fracture, dislocation, leg length discrepancy, limp, heterotopic bone formation, unforeseen complications, symptomatic hardware or hardware failure, deep vein thrombosis or pulmonary embolism, and need for further surgery, etc.  Patient and family understood this, asked appropriate questions, which were all answered, and they have elected to proceed with the procedure.     Electronically signed by   Sharri Pham DO  7/8/2021

## 2021-07-08 NOTE — PROGRESS NOTES
Occupational Therapy  Date:2021  Patient Name: Sheryl Martinez  MRN: [id number]  : 1926  Room: [address] [place] [address]-A     OT order received and appreciated. Chart reviewed. Hold OT evaluation, Plan for surgery with Dr. Temple the morning of 21 . Will follow.     Paola Neumann OTR/L #[id number]

## 2021-07-08 NOTE — PROGRESS NOTES
Physical Therapy  Name: Javier Norris  Room: 0268/2052-L  Date: 07/08/21    PT consult received and appreciated. Pt for ortho sx intervention. PT eval on hold at this time.      Denise Cherry, PT, DPT  [id number]

## 2021-07-09 ENCOUNTER — APPOINTMENT (OUTPATIENT)
Dept: GENERAL RADIOLOGY | Age: 86
DRG: 522 | End: 2021-07-09
Attending: INTERNAL MEDICINE
Payer: MEDICARE

## 2021-07-09 ENCOUNTER — ANESTHESIA (OUTPATIENT)
Dept: OPERATING ROOM | Age: 86
DRG: 522 | End: 2021-07-09
Payer: MEDICARE

## 2021-07-09 VITALS — OXYGEN SATURATION: 96 % | DIASTOLIC BLOOD PRESSURE: 94 MMHG | SYSTOLIC BLOOD PRESSURE: 195 MMHG

## 2021-07-09 DIAGNOSIS — Z96.649 S/P HIP HEMIARTHROPLASTY: Primary | ICD-10-CM

## 2021-07-09 LAB — URINE CULTURE, ROUTINE: NORMAL

## 2021-07-09 PROCEDURE — 2709999900 HC NON-CHARGEABLE SUPPLY: Performed by: ORTHOPAEDIC SURGERY

## 2021-07-09 PROCEDURE — 3600000015 HC SURGERY LEVEL 5 ADDTL 15MIN: Performed by: ORTHOPAEDIC SURGERY

## 2021-07-09 PROCEDURE — 2140000000 HC CCU INTERMEDIATE R&B

## 2021-07-09 PROCEDURE — 6360000002 HC RX W HCPCS

## 2021-07-09 PROCEDURE — 2500000003 HC RX 250 WO HCPCS

## 2021-07-09 PROCEDURE — 6370000000 HC RX 637 (ALT 250 FOR IP): Performed by: ORTHOPAEDIC SURGERY

## 2021-07-09 PROCEDURE — 7100000000 HC PACU RECOVERY - FIRST 15 MIN: Performed by: ORTHOPAEDIC SURGERY

## 2021-07-09 PROCEDURE — 2580000003 HC RX 258: Performed by: PHYSICIAN ASSISTANT

## 2021-07-09 PROCEDURE — 88305 TISSUE EXAM BY PATHOLOGIST: CPT

## 2021-07-09 PROCEDURE — 88311 DECALCIFY TISSUE: CPT

## 2021-07-09 PROCEDURE — P9041 ALBUMIN (HUMAN),5%, 50ML: HCPCS

## 2021-07-09 PROCEDURE — 6360000002 HC RX W HCPCS: Performed by: PHYSICIAN ASSISTANT

## 2021-07-09 PROCEDURE — 73502 X-RAY EXAM HIP UNI 2-3 VIEWS: CPT

## 2021-07-09 PROCEDURE — 2700000000 HC OXYGEN THERAPY PER DAY

## 2021-07-09 PROCEDURE — 0SRS0J9 REPLACEMENT OF LEFT HIP JOINT, FEMORAL SURFACE WITH SYNTHETIC SUBSTITUTE, CEMENTED, OPEN APPROACH: ICD-10-PCS | Performed by: ORTHOPAEDIC SURGERY

## 2021-07-09 PROCEDURE — C1776 JOINT DEVICE (IMPLANTABLE): HCPCS | Performed by: ORTHOPAEDIC SURGERY

## 2021-07-09 PROCEDURE — 2500000003 HC RX 250 WO HCPCS: Performed by: ORTHOPAEDIC SURGERY

## 2021-07-09 PROCEDURE — 3700000000 HC ANESTHESIA ATTENDED CARE: Performed by: ORTHOPAEDIC SURGERY

## 2021-07-09 PROCEDURE — C1713 ANCHOR/SCREW BN/BN,TIS/BN: HCPCS | Performed by: ORTHOPAEDIC SURGERY

## 2021-07-09 PROCEDURE — 7100000001 HC PACU RECOVERY - ADDTL 15 MIN: Performed by: ORTHOPAEDIC SURGERY

## 2021-07-09 PROCEDURE — 2580000003 HC RX 258: Performed by: ORTHOPAEDIC SURGERY

## 2021-07-09 PROCEDURE — 3700000001 HC ADD 15 MINUTES (ANESTHESIA): Performed by: ORTHOPAEDIC SURGERY

## 2021-07-09 PROCEDURE — 6370000000 HC RX 637 (ALT 250 FOR IP): Performed by: FAMILY MEDICINE

## 2021-07-09 PROCEDURE — 6370000000 HC RX 637 (ALT 250 FOR IP): Performed by: PHYSICIAN ASSISTANT

## 2021-07-09 PROCEDURE — 27236 TREAT THIGH FRACTURE: CPT | Performed by: ORTHOPAEDIC SURGERY

## 2021-07-09 PROCEDURE — 3600000005 HC SURGERY LEVEL 5 BASE: Performed by: ORTHOPAEDIC SURGERY

## 2021-07-09 PROCEDURE — 6360000002 HC RX W HCPCS: Performed by: ORTHOPAEDIC SURGERY

## 2021-07-09 DEVICE — STEM FEM SZ 6 L158MM NK L37MM 49MM OFFSET 127DEG PROX DST: Type: IMPLANTABLE DEVICE | Site: HIP | Status: FUNCTIONAL

## 2021-07-09 DEVICE — RESTRICTOR CEM M DIA24MM UNIV POLYMER IM INSRT DISP: Type: IMPLANTABLE DEVICE | Site: HIP | Status: FUNCTIONAL

## 2021-07-09 DEVICE — IMPLANTABLE DEVICE: Type: IMPLANTABLE DEVICE | Site: HIP | Status: FUNCTIONAL

## 2021-07-09 DEVICE — CEMENT BNE 20ML 41GM FULL DOSE PMMA W/ TOBRA M VISC RADPQ: Type: IMPLANTABLE DEVICE | Site: HIP | Status: FUNCTIONAL

## 2021-07-09 DEVICE — HEAD FEM OD46MM ID26MM HIP CO CHROM POLYETH BPLR CEMENTLESS: Type: IMPLANTABLE DEVICE | Site: HIP | Status: FUNCTIONAL

## 2021-07-09 DEVICE — HEAD FEM DIA26MM -3MM OFFSET HIP CO CHROM POLYETH TAPR LO: Type: IMPLANTABLE DEVICE | Site: HIP | Status: FUNCTIONAL

## 2021-07-09 RX ORDER — SODIUM CHLORIDE 9 MG/ML
25 INJECTION, SOLUTION INTRAVENOUS PRN
Status: DISCONTINUED | OUTPATIENT
Start: 2021-07-09 | End: 2021-07-09 | Stop reason: SDUPTHER

## 2021-07-09 RX ORDER — NEOSTIGMINE METHYLSULFATE 1 MG/ML
INJECTION, SOLUTION INTRAVENOUS PRN
Status: DISCONTINUED | OUTPATIENT
Start: 2021-07-09 | End: 2021-07-09 | Stop reason: SDUPTHER

## 2021-07-09 RX ORDER — SODIUM CHLORIDE 0.9 % (FLUSH) 0.9 %
10 SYRINGE (ML) INJECTION PRN
Status: DISCONTINUED | OUTPATIENT
Start: 2021-07-09 | End: 2021-07-09 | Stop reason: SDUPTHER

## 2021-07-09 RX ORDER — ONDANSETRON 2 MG/ML
4 INJECTION INTRAMUSCULAR; INTRAVENOUS EVERY 6 HOURS PRN
Status: DISCONTINUED | OUTPATIENT
Start: 2021-07-09 | End: 2021-07-11 | Stop reason: HOSPADM

## 2021-07-09 RX ORDER — MEPERIDINE HYDROCHLORIDE 25 MG/ML
12.5 INJECTION INTRAMUSCULAR; INTRAVENOUS; SUBCUTANEOUS EVERY 5 MIN PRN
Status: DISCONTINUED | OUTPATIENT
Start: 2021-07-09 | End: 2021-07-09

## 2021-07-09 RX ORDER — ROCURONIUM BROMIDE 10 MG/ML
INJECTION, SOLUTION INTRAVENOUS PRN
Status: DISCONTINUED | OUTPATIENT
Start: 2021-07-09 | End: 2021-07-09 | Stop reason: SDUPTHER

## 2021-07-09 RX ORDER — POLYETHYLENE GLYCOL 3350 17 G/17G
17 POWDER, FOR SOLUTION ORAL DAILY PRN
Status: DISCONTINUED | OUTPATIENT
Start: 2021-07-09 | End: 2021-07-09 | Stop reason: SDUPTHER

## 2021-07-09 RX ORDER — HYDROCODONE BITARTRATE AND ACETAMINOPHEN 5; 325 MG/1; MG/1
2 TABLET ORAL PRN
Status: DISCONTINUED | OUTPATIENT
Start: 2021-07-09 | End: 2021-07-09

## 2021-07-09 RX ORDER — MORPHINE SULFATE 2 MG/ML
1 INJECTION, SOLUTION INTRAMUSCULAR; INTRAVENOUS EVERY 5 MIN PRN
Status: DISCONTINUED | OUTPATIENT
Start: 2021-07-09 | End: 2021-07-09

## 2021-07-09 RX ORDER — FENTANYL CITRATE 50 UG/ML
INJECTION, SOLUTION INTRAMUSCULAR; INTRAVENOUS PRN
Status: DISCONTINUED | OUTPATIENT
Start: 2021-07-09 | End: 2021-07-09 | Stop reason: SDUPTHER

## 2021-07-09 RX ORDER — HYDROCODONE BITARTRATE AND ACETAMINOPHEN 5; 325 MG/1; MG/1
1 TABLET ORAL EVERY 4 HOURS PRN
Qty: 42 TABLET | Refills: 0 | Status: SHIPPED | OUTPATIENT
Start: 2021-07-09 | End: 2021-07-16

## 2021-07-09 RX ORDER — HYDROCODONE BITARTRATE AND ACETAMINOPHEN 5; 325 MG/1; MG/1
1 TABLET ORAL PRN
Status: DISCONTINUED | OUTPATIENT
Start: 2021-07-09 | End: 2021-07-09

## 2021-07-09 RX ORDER — PROPOFOL 10 MG/ML
INJECTION, EMULSION INTRAVENOUS PRN
Status: DISCONTINUED | OUTPATIENT
Start: 2021-07-09 | End: 2021-07-09 | Stop reason: SDUPTHER

## 2021-07-09 RX ORDER — SODIUM CHLORIDE 0.9 % (FLUSH) 0.9 %
10 SYRINGE (ML) INJECTION EVERY 12 HOURS SCHEDULED
Status: DISCONTINUED | OUTPATIENT
Start: 2021-07-09 | End: 2021-07-09 | Stop reason: SDUPTHER

## 2021-07-09 RX ORDER — MORPHINE SULFATE 2 MG/ML
2 INJECTION, SOLUTION INTRAMUSCULAR; INTRAVENOUS EVERY 5 MIN PRN
Status: DISCONTINUED | OUTPATIENT
Start: 2021-07-09 | End: 2021-07-09

## 2021-07-09 RX ORDER — ESMOLOL HYDROCHLORIDE 10 MG/ML
INJECTION INTRAVENOUS PRN
Status: DISCONTINUED | OUTPATIENT
Start: 2021-07-09 | End: 2021-07-09 | Stop reason: SDUPTHER

## 2021-07-09 RX ORDER — LIDOCAINE HYDROCHLORIDE 20 MG/ML
INJECTION, SOLUTION INTRAVENOUS PRN
Status: DISCONTINUED | OUTPATIENT
Start: 2021-07-09 | End: 2021-07-09 | Stop reason: SDUPTHER

## 2021-07-09 RX ORDER — ONDANSETRON 4 MG/1
4 TABLET, ORALLY DISINTEGRATING ORAL EVERY 8 HOURS PRN
Status: DISCONTINUED | OUTPATIENT
Start: 2021-07-09 | End: 2021-07-11 | Stop reason: HOSPADM

## 2021-07-09 RX ORDER — PROMETHAZINE HYDROCHLORIDE 25 MG/ML
6.25 INJECTION, SOLUTION INTRAMUSCULAR; INTRAVENOUS EVERY 10 MIN PRN
Status: DISCONTINUED | OUTPATIENT
Start: 2021-07-09 | End: 2021-07-09

## 2021-07-09 RX ORDER — PHENYLEPHRINE HCL IN 0.9% NACL 1 MG/10 ML
SYRINGE (ML) INTRAVENOUS PRN
Status: DISCONTINUED | OUTPATIENT
Start: 2021-07-09 | End: 2021-07-09 | Stop reason: SDUPTHER

## 2021-07-09 RX ORDER — DEXAMETHASONE SODIUM PHOSPHATE 10 MG/ML
INJECTION INTRAMUSCULAR; INTRAVENOUS PRN
Status: DISCONTINUED | OUTPATIENT
Start: 2021-07-09 | End: 2021-07-09 | Stop reason: SDUPTHER

## 2021-07-09 RX ORDER — GLYCOPYRROLATE 1 MG/5 ML
SYRINGE (ML) INTRAVENOUS PRN
Status: DISCONTINUED | OUTPATIENT
Start: 2021-07-09 | End: 2021-07-09 | Stop reason: SDUPTHER

## 2021-07-09 RX ORDER — ALBUMIN, HUMAN INJ 5% 5 %
SOLUTION INTRAVENOUS PRN
Status: DISCONTINUED | OUTPATIENT
Start: 2021-07-09 | End: 2021-07-09 | Stop reason: SDUPTHER

## 2021-07-09 RX ORDER — ONDANSETRON 2 MG/ML
INJECTION INTRAMUSCULAR; INTRAVENOUS PRN
Status: DISCONTINUED | OUTPATIENT
Start: 2021-07-09 | End: 2021-07-09 | Stop reason: SDUPTHER

## 2021-07-09 RX ADMIN — ESMOLOL HYDROCHLORIDE 10 MG: 10 INJECTION, SOLUTION INTRAVENOUS at 09:16

## 2021-07-09 RX ADMIN — HYDROMORPHONE HYDROCHLORIDE 0.5 MG: 1 INJECTION, SOLUTION INTRAMUSCULAR; INTRAVENOUS; SUBCUTANEOUS at 21:36

## 2021-07-09 RX ADMIN — ROCURONIUM BROMIDE 30 MG: 10 INJECTION, SOLUTION INTRAVENOUS at 07:45

## 2021-07-09 RX ADMIN — PROPOFOL 30 MG: 10 INJECTION, EMULSION INTRAVENOUS at 07:50

## 2021-07-09 RX ADMIN — HYDROCODONE BITARTRATE AND ACETAMINOPHEN 1 TABLET: 5; 325 TABLET ORAL at 03:51

## 2021-07-09 RX ADMIN — DEXAMETHASONE SODIUM PHOSPHATE 10 MG: 10 INJECTION INTRAMUSCULAR; INTRAVENOUS at 08:00

## 2021-07-09 RX ADMIN — PROPOFOL 50 MG: 10 INJECTION, EMULSION INTRAVENOUS at 07:45

## 2021-07-09 RX ADMIN — SODIUM CHLORIDE: 9 INJECTION, SOLUTION INTRAVENOUS at 07:35

## 2021-07-09 RX ADMIN — Medication 10 ML: at 22:32

## 2021-07-09 RX ADMIN — ROCURONIUM BROMIDE 10 MG: 10 INJECTION, SOLUTION INTRAVENOUS at 08:10

## 2021-07-09 RX ADMIN — Medication 10 ML: at 12:34

## 2021-07-09 RX ADMIN — FENTANYL CITRATE 25 MCG: 50 INJECTION, SOLUTION INTRAMUSCULAR; INTRAVENOUS at 09:00

## 2021-07-09 RX ADMIN — CEFAZOLIN 1000 MG: 1 INJECTION, POWDER, FOR SOLUTION INTRAMUSCULAR; INTRAVENOUS at 17:53

## 2021-07-09 RX ADMIN — Medication 3 MG: at 09:09

## 2021-07-09 RX ADMIN — BISACODYL 5 MG: 5 TABLET, COATED ORAL at 12:34

## 2021-07-09 RX ADMIN — FENTANYL CITRATE 25 MCG: 50 INJECTION, SOLUTION INTRAMUSCULAR; INTRAVENOUS at 07:45

## 2021-07-09 RX ADMIN — LIDOCAINE HYDROCHLORIDE 40 MG: 20 INJECTION, SOLUTION INTRAVENOUS at 07:45

## 2021-07-09 RX ADMIN — ONDANSETRON HYDROCHLORIDE 4 MG: 2 INJECTION, SOLUTION INTRAMUSCULAR; INTRAVENOUS at 08:55

## 2021-07-09 RX ADMIN — Medication 2000 MG: at 07:50

## 2021-07-09 RX ADMIN — FENTANYL CITRATE 25 MCG: 50 INJECTION, SOLUTION INTRAMUSCULAR; INTRAVENOUS at 07:50

## 2021-07-09 RX ADMIN — ALBUMIN (HUMAN) 25 G: 12.5 INJECTION, SOLUTION INTRAVENOUS at 08:06

## 2021-07-09 RX ADMIN — ACETAMINOPHEN 650 MG: 325 TABLET ORAL at 06:12

## 2021-07-09 RX ADMIN — AMLODIPINE BESYLATE 5 MG: 5 TABLET ORAL at 21:35

## 2021-07-09 RX ADMIN — Medication 50 MCG: at 08:02

## 2021-07-09 RX ADMIN — ESMOLOL HYDROCHLORIDE 10 MG: 10 INJECTION, SOLUTION INTRAVENOUS at 09:22

## 2021-07-09 RX ADMIN — PROPOFOL 20 MG: 10 INJECTION, EMULSION INTRAVENOUS at 09:04

## 2021-07-09 RX ADMIN — Medication 50 MCG: at 08:34

## 2021-07-09 RX ADMIN — HYDROCODONE BITARTRATE AND ACETAMINOPHEN 1 TABLET: 5; 325 TABLET ORAL at 12:34

## 2021-07-09 RX ADMIN — OXYCODONE HYDROCHLORIDE 5 MG: 5 TABLET ORAL at 23:33

## 2021-07-09 RX ADMIN — ESCITALOPRAM 5 MG: 10 TABLET, FILM COATED ORAL at 12:34

## 2021-07-09 RX ADMIN — ACETAMINOPHEN 650 MG: 325 TABLET ORAL at 23:33

## 2021-07-09 RX ADMIN — Medication 0.6 MG: at 09:09

## 2021-07-09 RX ADMIN — HYDROCODONE BITARTRATE AND ACETAMINOPHEN 1 TABLET: 5; 325 TABLET ORAL at 18:42

## 2021-07-09 ASSESSMENT — PAIN SCALES - GENERAL
PAINLEVEL_OUTOF10: 0
PAINLEVEL_OUTOF10: 5
PAINLEVEL_OUTOF10: 0
PAINLEVEL_OUTOF10: 6
PAINLEVEL_OUTOF10: 7
PAINLEVEL_OUTOF10: 6
PAINLEVEL_OUTOF10: 6
PAINLEVEL_OUTOF10: 0
PAINLEVEL_OUTOF10: 0

## 2021-07-09 ASSESSMENT — PULMONARY FUNCTION TESTS
PIF_VALUE: 15
PIF_VALUE: 3
PIF_VALUE: 20
PIF_VALUE: 1
PIF_VALUE: 19
PIF_VALUE: 20
PIF_VALUE: 19
PIF_VALUE: 19
PIF_VALUE: 2
PIF_VALUE: 15
PIF_VALUE: 24
PIF_VALUE: 18
PIF_VALUE: 21
PIF_VALUE: 18
PIF_VALUE: 2
PIF_VALUE: 21
PIF_VALUE: 19
PIF_VALUE: 18
PIF_VALUE: 20
PIF_VALUE: 2
PIF_VALUE: 20
PIF_VALUE: 2
PIF_VALUE: 20
PIF_VALUE: 21
PIF_VALUE: 1
PIF_VALUE: 21
PIF_VALUE: 20
PIF_VALUE: 19
PIF_VALUE: 19
PIF_VALUE: 18
PIF_VALUE: 19
PIF_VALUE: 2
PIF_VALUE: 20
PIF_VALUE: 2
PIF_VALUE: 21
PIF_VALUE: 20
PIF_VALUE: 20
PIF_VALUE: 2
PIF_VALUE: 3
PIF_VALUE: 20
PIF_VALUE: 18
PIF_VALUE: 20
PIF_VALUE: 20
PIF_VALUE: 15
PIF_VALUE: 19
PIF_VALUE: 17
PIF_VALUE: 18
PIF_VALUE: 10
PIF_VALUE: 21
PIF_VALUE: 12
PIF_VALUE: 11
PIF_VALUE: 19
PIF_VALUE: 5
PIF_VALUE: 19
PIF_VALUE: 20
PIF_VALUE: 20
PIF_VALUE: 3
PIF_VALUE: 20
PIF_VALUE: 17
PIF_VALUE: 20
PIF_VALUE: 20
PIF_VALUE: 18
PIF_VALUE: 20
PIF_VALUE: 21
PIF_VALUE: 19
PIF_VALUE: 20
PIF_VALUE: 19
PIF_VALUE: 16
PIF_VALUE: 18
PIF_VALUE: 1
PIF_VALUE: 3
PIF_VALUE: 19
PIF_VALUE: 17
PIF_VALUE: 2
PIF_VALUE: 20
PIF_VALUE: 20
PIF_VALUE: 21
PIF_VALUE: 20
PIF_VALUE: 19
PIF_VALUE: 21
PIF_VALUE: 15
PIF_VALUE: 19
PIF_VALUE: 20
PIF_VALUE: 19
PIF_VALUE: 20
PIF_VALUE: 20
PIF_VALUE: 15
PIF_VALUE: 20
PIF_VALUE: 18
PIF_VALUE: 2
PIF_VALUE: 20
PIF_VALUE: 4
PIF_VALUE: 19
PIF_VALUE: 20
PIF_VALUE: 19
PIF_VALUE: 21
PIF_VALUE: 22
PIF_VALUE: 19
PIF_VALUE: 20
PIF_VALUE: 21
PIF_VALUE: 15

## 2021-07-09 ASSESSMENT — PAIN DESCRIPTION - ONSET
ONSET: ON-GOING
ONSET: UNABLE TO TELL

## 2021-07-09 ASSESSMENT — PAIN DESCRIPTION - PROGRESSION: CLINICAL_PROGRESSION: NOT CHANGED

## 2021-07-09 ASSESSMENT — PAIN DESCRIPTION - PAIN TYPE
TYPE: ACUTE PAIN
TYPE: ACUTE PAIN

## 2021-07-09 ASSESSMENT — PAIN DESCRIPTION - FREQUENCY
FREQUENCY: INTERMITTENT
FREQUENCY: INTERMITTENT

## 2021-07-09 ASSESSMENT — PAIN DESCRIPTION - LOCATION
LOCATION: HIP
LOCATION: HIP

## 2021-07-09 ASSESSMENT — PAIN DESCRIPTION - DESCRIPTORS
DESCRIPTORS: PATIENT UNABLE TO DESCRIBE
DESCRIPTORS: ACHING;CONSTANT;DISCOMFORT

## 2021-07-09 ASSESSMENT — LIFESTYLE VARIABLES: SMOKING_STATUS: 0

## 2021-07-09 ASSESSMENT — PAIN - FUNCTIONAL ASSESSMENT: PAIN_FUNCTIONAL_ASSESSMENT: ACTIVITIES ARE NOT PREVENTED

## 2021-07-09 ASSESSMENT — PAIN DESCRIPTION - ORIENTATION
ORIENTATION: LEFT
ORIENTATION: LEFT

## 2021-07-09 NOTE — ANESTHESIA PRE PROCEDURE
Department of Anesthesiology  Preprocedure Note       Name:  Ananya Draper   Age:  80 y.o.  :  1926                                          MRN:  [id number]         Date:  2021      Surgeon: Candelaria Vaca):  Aleksandra Valdez DO    Procedure: Procedure(s):  HIP HEMIARTHROPLASTY VERSUS PERCUTANEOUS SCREW    Medications prior to admission:   Prior to Admission medications    Medication Sig Start Date End Date Taking?  Authorizing Provider   polyethylene glycol (GLYCOLAX) 17 g packet Take 17 g by mouth daily as needed for Constipation   Yes Historical Provider, MD   bisacodyl (DULCOLAX) 5 MG EC tablet Take 5 mg by mouth daily as needed for Constipation   Yes Historical Provider, MD   psyllium (KONSYL) 28.3 % PACK Take 1 packet by mouth daily   Yes Historical Provider, MD   escitalopram (LEXAPRO) 5 MG tablet TAKE ONE TABLET BY MOUTH ONCE A DAY 10/5/19  Yes Historical Provider, MD   amLODIPine (NORVASC) 5 MG tablet Take 5 mg by mouth as needed     Historical Provider, MD       Current medications:    Current Facility-Administered Medications   Medication Dose Route Frequency Provider Last Rate Last Admin    HYDROcodone-acetaminophen (NORCO) 5-325 MG per tablet 1 tablet  1 tablet Oral Q6H PRN Erika Domenic Camarillo, APRN - CNP   1 tablet at 21 0351    0.9 % sodium chloride infusion  25 mL Intravenous PRN Ewa Wright PA-C        ceFAZolin (ANCEF) 2000 mg in sterile water 20 mL IV syringe  2,000 mg Intravenous On Call to 23 Ronda Feliciano PA-C        sodium chloride flush 0.9 % injection 10 mL  10 mL Intravenous 2 times per day Ewa Wright PA-C   10 mL at 21 2231    sodium chloride flush 0.9 % injection 10 mL  10 mL Intravenous PRN Ewa Wright PA-C        amLODIPine (NORVASC) tablet 5 mg  5 mg Oral Daily April NASIM Lux - CNP   5 mg at 21 1117    bisacodyl (DULCOLAX) EC tablet 5 mg  5 mg Oral Daily PRN April DARREN LuxN - CNP   5 mg at 07/08/21 1121    escitalopram (LEXAPRO) tablet 5 mg  5 mg Oral Daily April Steve-Simone, APRN - CNP   5 mg at 07/08/21 1117    psyllium (KONSYL) 28.3 % packet 1 packet  1 packet Oral Daily April Jose J, APRN - CNP   1 packet at 07/08/21 1117    0.9 % sodium chloride infusion  25 mL Intravenous PRN April Storey-Simone, APRN - CNP        polyethylene glycol (GLYCOLAX) packet 17 g  17 g Oral Daily PRN April SteveNiyahSimone, APRN - CNP        sodium chloride flush 0.9 % injection 5-40 mL  5-40 mL Intravenous 2 times per day April ParkeSimone, APRN - CNP   10 mL at 07/08/21 2232    sodium chloride flush 0.9 % injection 5-40 mL  5-40 mL Intravenous PRN April ParkeSimone, APRN - CNP   10 mL at 07/08/21 0320    trimethobenzamide (TIGAN) injection 200 mg  200 mg Intramuscular Q6H PRN April Steve-Westfield, APRN - CNP        oxyCODONE (ROXICODONE) immediate release tablet 5 mg  5 mg Oral Q4H PRN Hellen Ross DO        Or    oxyCODONE HCl (OXY-IR) immediate release tablet 10 mg  10 mg Oral Q4H PRN Hellen Ross DO   10 mg at 07/08/21 1121    acetaminophen (TYLENOL) tablet 650 mg  650 mg Oral 4 times per day Hellen Ross DO   650 mg at 07/09/21 0612    ceFAZolin (ANCEF) 2000 mg in sterile water 20 mL IV syringe  2,000 mg Intravenous See Admin Instructions Hellen Ross DO           Allergies: Allergies   Allergen Reactions    Lisinopril      Hives    Statins      Joint pain, GI upset    Ciprofloxacin        Problem List:    Patient Active Problem List   Diagnosis Code    Bilateral carotid artery stenosis I65.23    Cerebrovascular accident (CVA) ([facility] [address] I63.9    Closed left hip fracture, initial encounter ([facility] [address] S72.002A    Transcervical fracture of femur ([facility] [address] S72.033A    History of CVA (cerebrovascular accident) Z86.73    HTN (hypertension) I10    Fall W19. XXXA    Cerebral parenchymal hemorrhage (HCC) I61.9    Hypokalemia E87.6    Leukocytosis A40.931    Subluxation T14. 8XXA       Past Medical History:        Diagnosis Date    Cerebral artery occlusion with cerebral infarction ([facility] [address]     Hx of blood clots     Hyperlipidemia     Hypertension        Past Surgical History:        Procedure Laterality Date    APPENDECTOMY      BLADDER REPAIR      CATARACT REMOVAL      EYE SURGERY      cataracts    HYSTERECTOMY      JOINT REPLACEMENT      L total knee, R total hip        Social History:    Social History     Tobacco Use    Smoking status: Never Smoker    Smokeless tobacco: Never Used   Substance Use Topics    Alcohol use: No                                Counseling given: Not Answered      Vital Signs (Current):   Vitals:    07/08/21 1902 07/08/21 2302 07/09/21 0342 07/09/21 0712   BP: (!) 114/56 (!) 132/58 (!) 131/55 (!) 123/58   Pulse: 81 98 85 81   Resp: 16 14 16 18   Temp: 37.3 °C (99.1 °F) 37.6 °C (99.6 °F) 36.9 °C (98.4 °F) 36.5 °C (97.7 °F)   TempSrc: Temporal Temporal Oral Temporal   SpO2: 92% 92% 92% 92%   Weight:       Height:                                                  BP Readings from Last 3 Encounters:   07/09/21 (!) 123/58   07/07/21 (!) 189/93   07/03/21 (!) 144/73       NPO Status: Time of last liquid consumption: 2359                        Time of last solid consumption: 1700                        Date of last liquid consumption: 07/08/21                        Date of last solid food consumption: 07/08/21    BMI:   Wt Readings from Last 3 Encounters:   07/08/21 140 lb 9.6 oz (63.8 kg)   06/29/21 140 lb (63.5 kg)   11/05/19 140 lb (63.5 kg)     Body mass index is 26.57 kg/m².     CBC:   Lab Results   Component Value Date    WBC 12.3 07/08/2021    RBC 5.37 07/08/2021    HGB 14.8 07/08/2021    HCT 44.3 07/08/2021    MCV 82.5 07/08/2021    RDW 14.1 07/08/2021     07/08/2021       CMP:   Lab Results   Component Value Date     07/08/2021    K 3.5 07/08/2021     07/08/2021    CO2 27 07/08/2021    BUN 16 07/08/2021    CREATININE 0.8 07/08/2021    GFRAA >60 07/08/2021    LABGLOM >60 07/08/2021    GLUCOSE 136 07/08/2021    PROT 6.1 07/08/2021    CALCIUM 9.8 07/08/2021    BILITOT 0.9 07/08/2021    ALKPHOS 96 07/08/2021    AST 17 07/08/2021    ALT 13 07/08/2021       POC Tests: No results for input(s): POCGLU, POCNA, POCK, POCCL, POCBUN, POCHEMO, POCHCT in the last 72 hours. Coags:   Lab Results   Component Value Date    PROTIME 13.3 07/07/2021    INR 1.2 07/07/2021    APTT 29.2 07/07/2021       HCG (If Applicable): No results found for: PREGTESTUR, PREGSERUM, HCG, HCGQUANT     ABGs: No results found for: PHART, PO2ART, TAI4HRL, CZD9ADC, BEART, K7EZRETA     Type & Screen (If Applicable):  No results found for: LABABO, LABRH    Drug/Infectious Status (If Applicable):  No results found for: HIV, HEPCAB    COVID-19 Screening (If Applicable):   Lab Results   Component Value Date    COVID19 Not Detected 06/29/2021     CXR 7/7/2021  FINDINGS:   The chest x-ray is rotated. Mert Deana are no findings of failure.       The right lung is clear.  Left upper lobe is clear.  The left lung base is   poorly visualized due to rotation overlying soft tissues as well as the   overlying left heart border.           Impression   1. There are no findings of failure   2. The right lung and left upper lobe are clear.      EKG 7/7/2021  Ventricular Rate     Atrial Rate     P-R Interval ms 146    QRS Duration ms 88    Q-T Interval ms 380    QTc Calculation (Bazett) ms 490    P Axis degrees 67    R Axis degrees -27    T Axis degrees -140    [facility]      Narrative & Impression    Normal sinus rhythm  Possible Left atrial enlargement  Left ventricular hypertrophy with repolarization abnormality  Cannot rule out Septal infarct (cited on or before 07-JUL-2021)  T wave abnormality, consider anterolateral ischemia  Abnormal ECG  When compared with ECG of 07-JUL-2021 16:51,  Serial changes of Septal infarct present Anesthesia Evaluation  Patient summary reviewed and Nursing notes reviewed no history of anesthetic complications:   Airway: Mallampati: II  TM distance: >3 FB   Neck ROM: full  Mouth opening: < 3 FB Dental:          Pulmonary:Negative Pulmonary ROS   (+) decreased breath sounds,      (-) not a current smoker                           Cardiovascular:    (+) hypertension:, hyperlipidemia      ECG reviewed  Rhythm: regular  Rate: normal  Echocardiogram reviewed         Beta Blocker:  Not on Beta Blocker         Neuro/Psych:   (+) CVA:,              ROS comment: Hip FX GI/Hepatic/Renal: Neg GI/Hepatic/Renal ROS            Endo/Other: Negative Endo/Other ROS                    Abdominal:             Vascular:   + PVD, aortic or cerebral (carotid stenosis), .        ROS comment: Hx of blood clots. Other Findings:             Anesthesia Plan      general     ASA 3       Induction: intravenous. MIPS: Postoperative opioids intended, Prophylactic antiemetics administered and Postoperative trial extubation. Anesthetic plan and risks discussed with patient. Use of blood products discussed with patient whom consented to blood products. Plan discussed with CRNA and attending. Gini Moore RN   7/9/2021      Pt seen, examined, chart reviewed, plan discussed.   Wiley Eduardo MD  7/9/2021  8:04 AM

## 2021-07-09 NOTE — OP NOTE
(Active)   $ Urethral catheter insertion Inserted for procedure 07/08/21 2209   Catheter Indications Prolonged immobilization (e.g. unstable thoracic or lumbar spine, multiple traumatic injuries such as pelvic fractures) 07/08/21 2209   Securement Device Date Changed 07/08/21 07/08/21 2209   Site Assessment No urethral drainage 07/09/21 0930   Urine Color Yellow 07/09/21 0930   Urine Appearance Hazy 07/08/21 2209   Output (mL) 225 mL 07/09/21 0506         Detailed Description of Procedure:       Brief Hospital Course:  Arnaud Barlow is a patient admitted through the ER c/o Left hip pain. Xrays revealed a Left femoral neck fx. After examination of the patient, review of the radiologic studies, and appropriate pre-operative risk assessment, Dequan Moncada MD recommended Left hip hemiarthroplasty, which the patient was agreeable towards. All questions were addressed to patients satisfaction and they did elect to proceed with the procedure as outlined. Operative Course: Outside the operating suite, the patient was seen and identified. The operative site was marked and identified as appropriate by the patient, staff, surgeon, and Anesthesia. The patient was taken into the operating room, placed on the operative table, and placed under the appropriate amount of sedation under the care of the anesthesia team. The patient was placed into a lateral decubitus position. All bony prominences and neurovascular structures were well padded and protected. The operative site was then prepped and draped in a standard sterile fashion. An incision was made over the lateral trochanteric region and carried down to the level of the fascia jude maintaining appropriate hemostasis with electrocautery. A small rent was placed in the fascia jude. Granda scissors were used to extend the fascia jude incision in line with its fibers to the length of the skin incision.  A Charnley retractor was then placed in the anterior and posterior margin of the tensor fascia jude incision, taking care not to violate any neurovascular structures. The anterior one-third of the gluteus medius tendon was identified. In line with its fibers, it was reflected using electrocautery off its trochanteric insertion. The gluteus medius and minimus tendons were reflected anteriorly down to the level of the hip capsule. The hip capsule was then divided in line with the superior portion of the femoral neck up to the acetabulum with care to preserve the labrum. The fracture was visualized. The template was utilized to rajeev the femoral neck cut and with beni retractors in place the femoral neck was then cut and removed. After this was done, a corkscrew was then placed into the femoral head. The femoral head was levered out of the acetabulum, and taken for sizing. At this point, the acetabulum was then inspected, removing all bony fragments and interposed tissue. A Woody retractor was placed under the femoral neck cut. A box osteotome was introduced into the femoral neck which was removed. Then a T-handle canal finder was then introduced and removed. Broaching began at a size 0 and was carried up sequentially to an appropriately sized broach. Due to patient's osteoporotic porosis we felt cemented stem fixation was most appropriate, a cement restrictor was placed, the canal was irrigated suctioned dried, hydrogen peroxide was also utilized to further dry the canal.  A size 6 stem was then cemented in standard technique, and extruded cement was removed with a Sterling. Cement was held in position until the cement had cured in vivo. An appropriately sized femoral head trial was then placed. The hip was reduced, taken through a range of motion, and was found to be appropriately stable. The hip was then dislocated. The femoral head trial component was then removed. The bipolar component was impacted onto the femoral stem into the appropriate position.  The hip was then reduced, taken through a range of motion, and was found to be appropriately stable. Leg lengths were felt to be near symmetrical. A betadine soak was performed followed by copious irrigation was performed of the joint. Pericapsular tissues were now injected with premixed analgesic concoction. Ethibond was used to reapproximate the gluteus medius tendon to its trochanteric insertion. Copious irrigation was performed once more. The tensor fascia jude was then closed with an #0 Vicryl. Copious irrigation was performed once more. #0 and 2-0 Vicryl were used to close the subcutaneous layer. Sutures were used for the skin. A sterile layered dressing was placed over the wound. The patient was awakened from anesthesia, transferred to a hospital bed, and taken to the PACU in stable condition. Disposition: The patient was taken to PACU in stable condition. Once stable, she will be transferred to the floor. Orders have been provided to begin physical therapy, weight bear as tolerated Left lower extremity. Patient received a dose of ancef preoperatively. We will continue this for 24 hours postoperatively for infection prophylaxis. DVT prophylaxis will be deferred to the medical team due to her history of recent CVA. Electronically Signed By  Jun Tim DO  7/9/2021    NOTE: This report was transcribed using voice recognition software.  Every effort was made to ensure accuracy; however, inadvertent computerized transcription errors may be present

## 2021-07-09 NOTE — CARE COORDINATION
S/p HIP HEMIARTHROPLASTY. Discharge plan will be to return to [facility]. Pt is skilled and no pre cert is needed to return. A rapid covid needed on day of dc. Ambulance form completed and placed with pt's soft chart.  Sw/cm will follow

## 2021-07-09 NOTE — PROGRESS NOTES
Post operative DVT prophylaxis will be defered to medicine team due to recent CVA  Electronically signed by Kimberly Loera PA-C on 7/9/2021 at 9:33 AM

## 2021-07-09 NOTE — BRIEF OP NOTE
thoracic or lumbar spine, multiple traumatic injuries such as pelvic fractures) 07/08/21 2209   Securement Device Date Changed 07/08/21 07/08/21 2209   Site Assessment No urethral drainage 07/08/21 2209   Urine Color Yellow 07/08/21 2209   Urine Appearance Hazy 07/08/21 2209   Output (mL) 225 mL 07/09/21 0506       Findings: see dictated operative report    Electronically signed by Chloe Narayan PA-C on 7/9/2021 at 9:24 AM

## 2021-07-09 NOTE — PROGRESS NOTES
Speech Language Pathology      NAME:  Kalpesh Posey  :  1926  DATE: 2021  ROOM:  OR POOL /NONE    Pt for OR today. Will resume SLP intervention at later time, as able.      Closed left hip fracture, initial encounter ([facility] [address] Dedra Hernandez

## 2021-07-09 NOTE — ANESTHESIA POSTPROCEDURE EVALUATION
Department of Anesthesiology  Postprocedure Note    Patient: Adelfo Blanco  MRN: [id number]  YOB: 1926  Date of evaluation: 7/9/2021  Time:  10:30 AM     Procedure Summary     Date: 07/09/21 Room / Location: [facility] OR 09 / [facility]    Anesthesia Start: 1982 Anesthesia Stop:     Procedure: HIP HEMIARTHROPLASTY VERSUS PERCUTANEOUS SCREW (Left ) Diagnosis: (FRACTURE)    Surgeons: Gopal Carver DO Responsible Provider: Debbie Wylie MD    Anesthesia Type: general ASA Status: 3          Anesthesia Type: general    Ector Phase I: Ector Score: 8    Ector Phase II:      Last vitals: Reviewed and per EMR flowsheets.        Anesthesia Post Evaluation    Patient location during evaluation: PACU  Patient participation: complete - patient participated  Level of consciousness: awake  Pain score: 3  Airway patency: patent  Nausea & Vomiting: no nausea and no vomiting  Complications: no  Cardiovascular status: blood pressure returned to baseline  Respiratory status: acceptable  Hydration status: euvolemic

## 2021-07-10 ENCOUNTER — APPOINTMENT (OUTPATIENT)
Dept: CT IMAGING | Age: 86
DRG: 522 | End: 2021-07-10
Attending: INTERNAL MEDICINE
Payer: MEDICARE

## 2021-07-10 LAB
ALBUMIN SERPL-MCNC: 3.3 G/DL (ref 3.5–5.2)
ALP BLD-CCNC: 82 U/L (ref 35–104)
ALT SERPL-CCNC: 8 U/L (ref 0–32)
AMMONIA: 21 UMOL/L (ref 11–51)
ANION GAP SERPL CALCULATED.3IONS-SCNC: 7 MMOL/L (ref 7–16)
AST SERPL-CCNC: 18 U/L (ref 0–31)
BASOPHILS ABSOLUTE: 0.02 E9/L (ref 0–0.2)
BASOPHILS RELATIVE PERCENT: 0.1 % (ref 0–2)
BILIRUB SERPL-MCNC: 0.4 MG/DL (ref 0–1.2)
BUN BLDV-MCNC: 29 MG/DL (ref 6–23)
CALCIUM SERPL-MCNC: 9.9 MG/DL (ref 8.6–10.2)
CHLORIDE BLD-SCNC: 106 MMOL/L (ref 98–107)
CO2: 27 MMOL/L (ref 22–29)
CREAT SERPL-MCNC: 0.9 MG/DL (ref 0.5–1)
EOSINOPHILS ABSOLUTE: 0.13 E9/L (ref 0.05–0.5)
EOSINOPHILS RELATIVE PERCENT: 0.9 % (ref 0–6)
GFR AFRICAN AMERICAN: >60
GFR NON-AFRICAN AMERICAN: 58 ML/MIN/1.73
GLUCOSE BLD-MCNC: 137 MG/DL (ref 74–99)
HCT VFR BLD CALC: 41.4 % (ref 34–48)
HEMOGLOBIN: 12.9 G/DL (ref 11.5–15.5)
IMMATURE GRANULOCYTES #: 0.1 E9/L
IMMATURE GRANULOCYTES %: 0.7 % (ref 0–5)
LACTIC ACID: 1.3 MMOL/L (ref 0.5–2.2)
LYMPHOCYTES ABSOLUTE: 1.04 E9/L (ref 1.5–4)
LYMPHOCYTES RELATIVE PERCENT: 7.2 % (ref 20–42)
MCH RBC QN AUTO: 27.1 PG (ref 26–35)
MCHC RBC AUTO-ENTMCNC: 31.2 % (ref 32–34.5)
MCV RBC AUTO: 87 FL (ref 80–99.9)
METER GLUCOSE: 150 MG/DL (ref 74–99)
MONOCYTES ABSOLUTE: 0.99 E9/L (ref 0.1–0.95)
MONOCYTES RELATIVE PERCENT: 6.8 % (ref 2–12)
NEUTROPHILS ABSOLUTE: 12.22 E9/L (ref 1.8–7.3)
NEUTROPHILS RELATIVE PERCENT: 84.3 % (ref 43–80)
PDW BLD-RTO: 14.2 FL (ref 11.5–15)
PLATELET # BLD: 174 E9/L (ref 130–450)
PMV BLD AUTO: 11.2 FL (ref 7–12)
POTASSIUM SERPL-SCNC: 4.3 MMOL/L (ref 3.5–5)
RBC # BLD: 4.76 E12/L (ref 3.5–5.5)
SODIUM BLD-SCNC: 140 MMOL/L (ref 132–146)
T4 FREE: 1.44 NG/DL (ref 0.93–1.7)
TOTAL PROTEIN: 5.8 G/DL (ref 6.4–8.3)
TSH SERPL DL<=0.05 MIU/L-ACNC: 0.02 UIU/ML (ref 0.27–4.2)
WBC # BLD: 14.5 E9/L (ref 4.5–11.5)

## 2021-07-10 PROCEDURE — 6370000000 HC RX 637 (ALT 250 FOR IP): Performed by: PHYSICIAN ASSISTANT

## 2021-07-10 PROCEDURE — 2580000003 HC RX 258: Performed by: PHYSICIAN ASSISTANT

## 2021-07-10 PROCEDURE — 84443 ASSAY THYROID STIM HORMONE: CPT

## 2021-07-10 PROCEDURE — 6370000000 HC RX 637 (ALT 250 FOR IP): Performed by: STUDENT IN AN ORGANIZED HEALTH CARE EDUCATION/TRAINING PROGRAM

## 2021-07-10 PROCEDURE — 85025 COMPLETE CBC W/AUTO DIFF WBC: CPT

## 2021-07-10 PROCEDURE — 1200000000 HC SEMI PRIVATE

## 2021-07-10 PROCEDURE — 97110 THERAPEUTIC EXERCISES: CPT

## 2021-07-10 PROCEDURE — 70450 CT HEAD/BRAIN W/O DYE: CPT

## 2021-07-10 PROCEDURE — 82962 GLUCOSE BLOOD TEST: CPT

## 2021-07-10 PROCEDURE — 6360000002 HC RX W HCPCS: Performed by: STUDENT IN AN ORGANIZED HEALTH CARE EDUCATION/TRAINING PROGRAM

## 2021-07-10 PROCEDURE — 6360000002 HC RX W HCPCS

## 2021-07-10 PROCEDURE — 84439 ASSAY OF FREE THYROXINE: CPT

## 2021-07-10 PROCEDURE — 82140 ASSAY OF AMMONIA: CPT

## 2021-07-10 PROCEDURE — 2700000000 HC OXYGEN THERAPY PER DAY

## 2021-07-10 PROCEDURE — 36415 COLL VENOUS BLD VENIPUNCTURE: CPT

## 2021-07-10 PROCEDURE — 97161 PT EVAL LOW COMPLEX 20 MIN: CPT

## 2021-07-10 PROCEDURE — 97530 THERAPEUTIC ACTIVITIES: CPT

## 2021-07-10 PROCEDURE — 6360000002 HC RX W HCPCS: Performed by: PHYSICIAN ASSISTANT

## 2021-07-10 PROCEDURE — 80053 COMPREHEN METABOLIC PANEL: CPT

## 2021-07-10 PROCEDURE — 83605 ASSAY OF LACTIC ACID: CPT

## 2021-07-10 RX ORDER — OXYCODONE HYDROCHLORIDE 5 MG/1
2.5 TABLET ORAL EVERY 4 HOURS PRN
Status: DISCONTINUED | OUTPATIENT
Start: 2021-07-10 | End: 2021-07-11 | Stop reason: HOSPADM

## 2021-07-10 RX ORDER — OXYCODONE HYDROCHLORIDE 5 MG/1
5 TABLET ORAL EVERY 4 HOURS PRN
Status: DISCONTINUED | OUTPATIENT
Start: 2021-07-10 | End: 2021-07-11 | Stop reason: HOSPADM

## 2021-07-10 RX ORDER — NALOXONE HYDROCHLORIDE 0.4 MG/ML
0.3 INJECTION, SOLUTION INTRAMUSCULAR; INTRAVENOUS; SUBCUTANEOUS ONCE
Status: COMPLETED | OUTPATIENT
Start: 2021-07-10 | End: 2021-07-10

## 2021-07-10 RX ORDER — NALOXONE HYDROCHLORIDE 0.4 MG/ML
INJECTION, SOLUTION INTRAMUSCULAR; INTRAVENOUS; SUBCUTANEOUS
Status: COMPLETED
Start: 2021-07-10 | End: 2021-07-10

## 2021-07-10 RX ORDER — MORPHINE SULFATE 2 MG/ML
1 INJECTION, SOLUTION INTRAMUSCULAR; INTRAVENOUS EVERY 4 HOURS PRN
Status: DISCONTINUED | OUTPATIENT
Start: 2021-07-10 | End: 2021-07-11 | Stop reason: HOSPADM

## 2021-07-10 RX ADMIN — ONDANSETRON 4 MG: 2 INJECTION INTRAMUSCULAR; INTRAVENOUS at 14:54

## 2021-07-10 RX ADMIN — NALOXONE HYDROCHLORIDE 0.3 MG: 0.4 INJECTION, SOLUTION INTRAMUSCULAR; INTRAVENOUS; SUBCUTANEOUS at 06:22

## 2021-07-10 RX ADMIN — ACETAMINOPHEN 650 MG: 325 TABLET ORAL at 09:03

## 2021-07-10 RX ADMIN — ACETAMINOPHEN 650 MG: 325 TABLET ORAL at 14:26

## 2021-07-10 RX ADMIN — OXYCODONE HYDROCHLORIDE 5 MG: 5 TABLET ORAL at 22:02

## 2021-07-10 RX ADMIN — MORPHINE SULFATE 1 MG: 2 INJECTION, SOLUTION INTRAMUSCULAR; INTRAVENOUS at 14:26

## 2021-07-10 RX ADMIN — MORPHINE SULFATE 1 MG: 2 INJECTION, SOLUTION INTRAMUSCULAR; INTRAVENOUS at 18:44

## 2021-07-10 RX ADMIN — ACETAMINOPHEN 650 MG: 325 TABLET ORAL at 22:02

## 2021-07-10 RX ADMIN — OXYCODONE HYDROCHLORIDE 2.5 MG: 5 TABLET ORAL at 12:16

## 2021-07-10 RX ADMIN — Medication 10 ML: at 08:38

## 2021-07-10 RX ADMIN — PSYLLIUM HUSK 1 PACKET: 3.4 GRANULE ORAL at 08:37

## 2021-07-10 RX ADMIN — Medication 10 ML: at 22:01

## 2021-07-10 RX ADMIN — ESCITALOPRAM 5 MG: 10 TABLET, FILM COATED ORAL at 08:37

## 2021-07-10 RX ADMIN — OXYCODONE HYDROCHLORIDE 5 MG: 5 TABLET ORAL at 15:55

## 2021-07-10 RX ADMIN — CEFAZOLIN 1000 MG: 1 INJECTION, POWDER, FOR SOLUTION INTRAMUSCULAR; INTRAVENOUS at 02:31

## 2021-07-10 RX ADMIN — AMLODIPINE BESYLATE 5 MG: 5 TABLET ORAL at 08:37

## 2021-07-10 RX ADMIN — OXYCODONE HYDROCHLORIDE 2.5 MG: 5 TABLET ORAL at 11:45

## 2021-07-10 RX ADMIN — HYDROMORPHONE HYDROCHLORIDE 0.5 MG: 1 INJECTION, SOLUTION INTRAMUSCULAR; INTRAVENOUS; SUBCUTANEOUS at 04:26

## 2021-07-10 ASSESSMENT — PAIN DESCRIPTION - LOCATION
LOCATION: HIP
LOCATION: HIP;LEG
LOCATION: HIP
LOCATION: HIP

## 2021-07-10 ASSESSMENT — PAIN DESCRIPTION - ONSET
ONSET: ON-GOING

## 2021-07-10 ASSESSMENT — PAIN SCALES - GENERAL
PAINLEVEL_OUTOF10: 0
PAINLEVEL_OUTOF10: 3
PAINLEVEL_OUTOF10: 3
PAINLEVEL_OUTOF10: 5
PAINLEVEL_OUTOF10: 7
PAINLEVEL_OUTOF10: 10
PAINLEVEL_OUTOF10: 0
PAINLEVEL_OUTOF10: 5
PAINLEVEL_OUTOF10: 0
PAINLEVEL_OUTOF10: 0
PAINLEVEL_OUTOF10: 5
PAINLEVEL_OUTOF10: 0
PAINLEVEL_OUTOF10: 6
PAINLEVEL_OUTOF10: 5
PAINLEVEL_OUTOF10: 5

## 2021-07-10 ASSESSMENT — PAIN DESCRIPTION - ORIENTATION
ORIENTATION: LEFT

## 2021-07-10 ASSESSMENT — PAIN DESCRIPTION - PAIN TYPE
TYPE: ACUTE PAIN

## 2021-07-10 ASSESSMENT — PAIN DESCRIPTION - PROGRESSION
CLINICAL_PROGRESSION: NOT CHANGED

## 2021-07-10 ASSESSMENT — PAIN DESCRIPTION - DESCRIPTORS
DESCRIPTORS: ACHING;DISCOMFORT;CONSTANT
DESCRIPTORS: ACHING;CONSTANT;DISCOMFORT

## 2021-07-10 ASSESSMENT — PAIN DESCRIPTION - FREQUENCY
FREQUENCY: INTERMITTENT

## 2021-07-10 NOTE — PROGRESS NOTES
Subjective: The patient is awake and alert. No acute events overnight. Denies chest pain, angina, SOB     Objective:    BP (!) 159/63   Pulse 103   Temp 96.7 °F (35.9 °C) (Temporal)   Resp 18   Ht 5' 1\" (1.549 m)   Wt 140 lb 9.6 oz (63.8 kg)   SpO2 95%   BMI 26.57 kg/m²     In: 940 [P.O.:180; I.V.:760]  Out: 700   In: 940   Out: 700 [Urine:600]    General appearance: NAD, conversant  HEENT: AT/NC, MMM  Neck: FROM, supple  Lungs: Clear to auscultation  CV: RRR, no MRGs  Vasc: Radial pulses 2+  Abdomen: Soft, non-tender; no masses or HSM  Extremities: No peripheral edema or digital cyanosis  Skin: no rash, lesions or ulcers  Psych: Alert and oriented to person, place and time  Neuro: Alert and interactive     Recent Labs     07/07/21  1509 07/08/21  0506   WBC 15.9* 12.3*   HGB 16.1* 14.8   HCT 48.2* 44.3    216       Recent Labs     07/07/21  1509 07/07/21  2350 07/08/21  0506     --  139   K 2.9* 3.1* 3.5     --  103   CO2 27  --  27   BUN 16  --  16   CREATININE 0.7  --  0.8   CALCIUM 10.6*  --  9.8       Assessment:    Principal Problem:    Closed left hip fracture, initial encounter [facility])  Active Problems:    Cerebrovascular accident (CVA) ([facility] [address]  Resolved Problems:    * No resolved hospital problems.  *      Plan:    Patient known to me from previous admission at which point she had an acute ischemic stroke requiring IV TPA  Subsequently had significant hemorrhagic conversion, she was at that time a DNR comfort care  However she then became more lucid, verbal following commands etc.  CODE STATUS was then changed to DNR CCA  She was discharged to facility, apparently sustained a fall and had a left hip fracture there  On my evaluation she is essentially at her baseline post stroke  Pod 1 for hip arthroplasty on 7/8 - tolerated well   No active chest pain or shortness of breath  Head CT is stable with no worsening of hemorraghic bleed  from previous     Caution with chemical DVT prophylaxis post surgery secondary to recent brain bleed from IV TPA  Will keep pcd's and WBAT   No role for anticoagulation or antiplatelets   dw daughter at bedside     DVT Prophylaxis   PT/OT  Discharge planning - ok to rehab           Piter Bridges MD  9:51 PM  7/9/2021

## 2021-07-10 NOTE — SIGNIFICANT EVENT
Rapid Response Team Note  Date of event: 7/10/2021   Time of event: 06:20  [facility]  80y.o. year old female   YOB: 1926   Admit date:  7/7/2021   Location: [address] [place] [address]-[place]   Witnessed? : [x]Yes  [] No  Monitored? : [x]Yes  [] No  Code status: [] Full  [x] DNR-CCA  []DNR-CC  ______________________________________________________________________  Reason for RRT:    [] RR < 8     [] RR > 28   [] SpO2 <90%   [] HR < 40 bpm   [] HR > 130 bpm  [] SBP < 90 mmHg    [] SpO2 <90%   [] LOC   [] Seizures    [] Significant Bleeding Event    [] Other: AMS    Subjective:   CTSP due to AMS/unresponsiveness. Upon arrival, patient's vital signs were stable. Patient was thrashing in bed, not following any commands, pupils were equal and reactive, unable to assess for any weakness as patient was not following commands. Per history from nurse, patient is currently admitted for left hip fracture after a fall and was recently in the hospital for left-sided ischemic stroke s/p IV TPA. Patient had received Narcan prior to arrival.  Given that history and patient's condition, a stat CT head without contrast was ordered which came back negative for for any new lesions or hemorrhages. Labs for acute encephalopathy were sent including TSH, ammonia, CBC, CMP, and lactic acid. Upon return from CT scan, patient was calm, following some commands. Was not agitated. RRT was called off as patient was in stable condition.   Objective:   Vital signs: Temp: 98.2/BP: 116/56/RR: 20/HR: 82  Initial Condition:  Conscious   [x] Yes  [] No     Breathing [x] Yes  [] No     Pulse  [x] Yes  [] No    Airway:   [x] Open/ Clear     Intervention: [x] None  [] Pooled secretions     [] Suctioned  [] Stridor      [] Intubation    Lungs:   [x] Symmetrical chest rise/ CTABL Intervention: [] None  [] Use of accessory muscles    [] NIV (CPAP/BiPAP)  [] Cyanosis      [] Nasal Oxygen/Mask  [] Wheezing       [] ABG             [] CXR  [] Other: Circulation:   Rhythm:  [x] Sinus [] Other:   Intervention: [x] None            [] IV Access  [] Peripheral              [] Central            [] EKG            [] Cardioversion            [] Defibrillation     Capillary Refill:  [] > 2 seconds [] < 2 seconds    Neurologic:   [] NIHSS      [x] Pupillary Response: Equal and reactive   Response to pain:   [x] Yes  [] No  Follow commands:  [] Yes  [x] No  Facial asymmetry:  [] Yes  [x] No  Motor strength:  [] Equal  [] Focal deficit: Unable to assess  Diagnostic Test:  Blood Sugar:  150 mg/dL  EKG:      ABG:    No results found for: PH, PCO2, PO2, HCO3, O2SAT  Medication(s) Given:  []  Narcan (Naloxone)   []  Romazicon (Flumazenil)    []  Breathing Treatment    []  Steroids (Prednisone, Solu-Medrol)  []  Adenosine  [] Cardiac Medicines:     Infusion(s):   [] Normal Saline    Amount: cc/hr      [] Lactate Ringers      [] Other:    Imaging:   [] CXR:   [] Normal         [] Pneumothorax         [] Pulmonary Edema  [] Infiltrate          [x] CT Head  [] Normal          [] ICB          [] SAH          [] Other: No acute changes    Laboratory Tests:   CBC, CMP, TSH, LA, ammonia      Teams Assessment and Plan:  1. AMS, likely 2/2 delirium. Stat CT head negative for any new lesions or hemorrhages. No midline shift. Concern for hemorrhagic cerebral metastases and metastatic melanoma. Patient currently stable, calm, following some commands. -CMP, lactic acid, ammonia unremarkable    -TSH very low at 0.021, free T4 was added    -Continue to monitor mental status    -Did not want to give any benzos to patient as she was calm and not   agitated upon return from CT scan    -May consider as needed Seroquel, Geodon, for agitation  ? ?   Disposition:  [x] No transfer   [] Transfer to monitor floor  [] Transfer to: [] MICU [] NICU [] CCU [] SICU    Patients family updated: [] Yes  [x] No   Discussed with:  [] Critical Care Intensivist:         [] Primary Care Provider: Dr Bong Pittman      [] Other:     Jess Durham MD PGY-3  7/10/2021 7:42 AM  Attending Physician: Dr Bong Pittman

## 2021-07-10 NOTE — PATIENT CARE CONFERENCE
[facility] Quality Flow/Interdisciplinary Rounds Progress Note        Quality Flow Rounds held on July 10, 2021    Disciplines Attending:  Bedside Nurse, ,  and Nursing Unit Leadership    Debby Allen was admitted on 7/7/2021 10:17 PM    Anticipated Discharge Date:  Expected Discharge Date: 07/12/21    Disposition:    Lance Score:  Lance Scale Score: 13    Readmission Risk              Risk of Unplanned Readmission:  16           Discussed patient goal for the day, patient clinical progression, and barriers to discharge.   The following Goal(s) of the Day/Commitment(s) have been identified:  Transfer - Obtain Order, denise Singh RN  July 10, 2021

## 2021-07-10 NOTE — PROGRESS NOTES
Passive range of motion performed with patient, patient active participant near end, freely bending right and left legs in bed.  Left message for PT/OT to come and see patient per Ortho request     Gay Shelley RN

## 2021-07-10 NOTE — PROGRESS NOTES
Pt given norco 1 tab at 1842 this evening, pt yelling out please help me, patient pulling at IV line, oxygen tubing and tugging at brantley cath, pt unable to make pain needs known at this this time, pt medicated with dilaudid 0.5mg IV, repositioned in bed, with ice applied to left hip for comfort.

## 2021-07-10 NOTE — PROGRESS NOTES
Pt continues to pull oxygen tubing out of her nose, calling out please help, pt is confused and not following commands, pt continues to have non purposeful movement of arms grabbing at gown, left hip dressing and brantley cath. Pt is unable to tell me her pain level but is calling out for help. Medicated pt with oxycodone 5 mg po. Pt pulled  Left hip dressing  Loose , was reinforced with tape , will continue to monitor.

## 2021-07-10 NOTE — PROGRESS NOTES
Pt pulling at oxygen tubing out of her nose, called consistently by [employer] monitor tech, remained at bedside to check pt pulse ox without nasal cannula 5 liters on . Pt pulse ox dropped 86-88. O2 per nasal cannula reapplied, pt at this time unable to follow commands to keep oxygen  tubing on, pt is scratching and pulling on tubing. Pt given constant reminders and cues to keep oxygen on.  Will continue to monitor

## 2021-07-10 NOTE — PROGRESS NOTES
Pt awake and pulling at oxygen tubing fidgety in bed, pt is unable to be redirected with the [employer] monitor tech, pt placed with sitter at bedside for safety and care, Pt continues to be non redirectable. Will continue to monitor.

## 2021-07-10 NOTE — PROGRESS NOTES
Physical Therapy    [facility]  [facility]    NAME: Sariah Nick  : 1926  MRN: [id number]    Date of Service: 7/10/2021      Attending Provider:  Ashley Saha MD    Evaluating PT:  Edison Tejeda. Obed Laguerre., P.T. 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 SUBJECTIVE:    Pt came in from [place] [address] in which she went to after having a CVA last week. While there she fell and suffered L hip fx. Prior to CVA pt lived at home with daughter and son-in-law in a 2 story home with 1st floor set up for pt and no stairs to enter. Pt ambulated with a rollator ww prior to CVA and daughter states pt used 2 SBQCs to walk into the BR as doorway was too narrow to fit her rollator. OBJECTIVE:   Initial Evaluation  Date: 7/10/21 Treatment Short Term/ Long Term   Goals   Was pt agreeable to Eval/treatment? Yes and daughter also agreeable     Does pt have pain? Some apparent pain with certain movements, but did not seem severe     Bed Mobility  Rolling: MAX A  Supine to sit: MAX A x2  Sit to supine: MAX A x2  Scooting: MAX A  MIN A   Transfers Sit to stand: MAX A x2  Stand to sit: MAX A x2  Stand pivot: NA  MIN A   Ambulation   2 side steps to L with ww MAX A x2  50 feet with ww MIN A   Stair negotiation: ascended and descended NA  NA   AM-PAC 6 Clicks 73/74       Pt is A & O x0 (did not speak and was confused)  BLE ROM is WFL, L hip is WFL allowed by precautions. RLE strength is grossly 4/5 and L hip 2-/5 to 2/5, knee 2/5, and ankle 3-/5.    Balance: sitting is SBA/MIN A and standing with ww is MAX A x2  Endurance: fair    Therapeutic Exercises:  Pt was instructed in/performed supine exercises with the LLE: ankle PF/DF 15 reps, heel slides and quad sets AAROM 2x7 reps, hip ABD/ADD AAROM 2x10 reps. Patient education  Pt educated on bed mobility, transfers, gait with ww    Patient response to education:   Pt verbalized understanding Pt demonstrated skill Pt requires further education in this area   no Inconsistent with VCs and assist yes     ASSESSMENT:    Conditions Requiring Skilled Therapeutic Intervention:    [x]Decreased strength     [x]Decreased ROM  [x]Decreased functional mobility  [x]Decreased balance   [x]Decreased endurance   [x]Decreased posture  []Decreased sensation  []Decreased coordination   []Decreased vision  [x]Decreased safety awareness   [x]Increased pain       Comments:  Pt was found in bed and daughter was present and agreeable to PT. Pt had CVA last week and suffering expressive aphasia and did not speak throughout session. She demonstrated impulsivity at times and tried multiple times to take her gown off. Pt did follow some directions and was able to participate with PT. After above supine exs, pt sat on EOB and performed sit<>stands x 3 reps. First stand she was instructed on taking steps forward, but was unable to advance her feet at this time. Second attempt while standing she was instructed to side step to her L and was able to move B feet with very short steps and MAX A x2. Pt stood a third time and was unable to advance feet at this point and appeared to be fatiguing. Pt was assisted back into bed. Pt is confused and has decreased safety awareness and is a high fall risk at this time. Treatment:  Patient practiced and was instructed in the following treatment:     Bed mobility, transfers, sitting EOB, standing with ww, and side stepping with ww to improve functional strength, balance, and endurance.  Above exs to improve LLE ROM and decrease muscle spasm and pain.      Pt was left supine in bed with call light left by patient and daughter and student nurse with pt.    Chair/bed alarm: bed alarm was re-activated. Pt's/ family goals   1. To go home     Patient and or family understand(s) diagnosis, prognosis, and plan of care. PHYSICAL THERAPY PLAN OF CARE:    PT POC is established based on physician order and patient diagnosis     Referring provider/PT Order:  PT eval and treat  Diagnosis:  Closed left hip fracture, initial encounter [facility] [S72.002A]  Specific instructions for next treatment:  Continue with LLE exs and progress functional mobility and sitting up in chair as pt is able and is safe. Current Treatment Recommendations:     [x] Strengthening to improve independence with functional mobility   [x] ROM to improve ROM and decrease spasm and pain which will help promote independence with functional mobility   [x] Balance Training to improve static/dynamic balance and to reduce fall risk  [x] Endurance Training to improve activity tolerance during functional mobility   [x] Transfer Training to improve safety and independence with all functional transfers   [x] Gait Training to improve gait mechanics, endurance and assess need for appropriate assistive device  [x] Stair Training in preparation for safe discharge home and/or into the community   [] Positioning to prevent skin breakdown and contractures  [x] Safety and Education Training   [] Patient/Caregiver Education   [x] HEP  [] Other     PT long term treatment goals are located in above grid    Frequency of treatments: 2-5x/week x 1-2 weeks. Time in  10:50  Time out  11:30    Total Treatment Time  25 minutes     Evaluation Time includes thorough review of current medical information, gathering information on past medical history/social history and prior level of function, completion of standardized testing/informal observation of tasks, assessment of data and education on plan of care and goals.     CPT codes:  [x] Low Complexity PT evaluation 00899  [] Moderate Complexity PT evaluation 08742  [] High Complexity PT evaluation [id number]  [] PT Re-evaluation [id number]  [] Gait training 83805 ** minutes  [] Manual therapy 30681 ** minutes  [x] Therapeutic activities 45732 15 minutes  [x] Therapeutic exercises 20155 10 minutes  [] Neuromuscular reeducation 23237 ** minutes     Prieto Aguilar, P.T.   License Number: [id number]

## 2021-07-10 NOTE — PROGRESS NOTES
Message sent to Ortho resident regarding decreasing dose of roxicodone and making changes to decrease IV pain medication to avoid oversedation per daughters request.     Jean Carlos Tapia RN

## 2021-07-10 NOTE — PROGRESS NOTES
Pt eyes open, not responding to painful stimuli, pt respirations easy unlabored,   VS   /56  HR-77  T-98.52  R-16  97%4L  BS -150.  RRT at bedside

## 2021-07-10 NOTE — PROGRESS NOTES
Department of Orthopedic Surgery  Resident Progress Note    Patient seen and examined. RRT was called last night for patient. She has been resting comfortably this morning. Pain controlled with acetaminophen. She has not been ambulatory with PT as of yet. No chest pain or difficulty breathing. No nausea or vomiting.      VITALS:  BP (!) 144/63   Pulse 82   Temp 98.6 °F (37 °C) (Temporal)   Resp 16   Ht 5' 1\" (1.549 m)   Wt 140 lb 9.6 oz (63.8 kg)   SpO2 92%   BMI 26.57 kg/m²     General: resting, no acute distress    MUSCULOSKELETAL:   left lower extremity:  · Dressing C/D/I, minimal drainage  · Compartments soft and compressible  · +PF/DF/EHL  · +2/4 DP & PT pulses, Brisk Cap refill, Toes warm and perfused  · Distal sensation grossly intact to Peroneals, Sural, Saphenous, and tibial nrs    CBC:   Lab Results   Component Value Date    WBC 14.5 07/10/2021    HGB 12.9 07/10/2021    HCT 41.4 07/10/2021     07/10/2021     PT/INR:    Lab Results   Component Value Date    PROTIME 13.3 07/07/2021    INR 1.2 07/07/2021       ASSESSMENT  · S/P left hip hemiarthroplasty 7/9/21    PLAN      · Appreciate physical therapy evaluation, WBAT LLE  · 24 hour abx coverage  · Deep venous thrombosis prophylaxis - per medicine given patient history, early mobilization  · Pain Control: IV and PO  · Monitor H&H 12.9  · D/C Plan:  PT/OT/SW

## 2021-07-10 NOTE — PROGRESS NOTES
Nurse to Nurse called to 46. Patient belongings placed in belonging bag, including glasses and hearing aid case on bed during transport. Bilateral hearing aids in patients ears at time of transfer.      Maye Brown RN     Notified family of room number 0909

## 2021-07-10 NOTE — PROGRESS NOTES
Patient to and from cat scan via bed in stable condition. Called pt name , yelled out hello. Asked was she okay she said she is fine . Dr. Leti Turcios awaiting CT scan, will continue to monitor.

## 2021-07-10 NOTE — PROGRESS NOTES
Internal Medicine   Progress Note    Admit Date: 7/7/2021  Hospital day:    Hospital Day: 4  SUBJECTIVE:   Closed hip fracture s/p left hip hemiarthroplasty on 7/9/2021. Daughter in the room. As per daughter patient could not tolerate many of the pain medications. No chest pain or shortness of breath. No nausea or vomiting. No fever. No abdominal pain. Talks a lot. OBJECTIVE:     BP (!) 104/58   Pulse 92   Temp 98.6 °F (37 °C) (Temporal)   Resp 16   Ht 5' 1\" (1.549 m)   Wt 140 lb 9.6 oz (63.8 kg)   SpO2 92%   BMI 26.57 kg/m²   Patient Vitals for the past 24 hrs:   BP Temp Temp src Pulse Resp SpO2   07/10/21 1147 (!) 104/58 98.6 °F (37 °C) Temporal 92 16 92 %   07/10/21 0903      92 %   07/10/21 0747 (!) 144/63 98.6 °F (37 °C) Temporal 82 16 97 %   07/10/21 0337 130/71 99.5 °F (37.5 °C) Temporal  18 95 %   07/09/21 2330 (!) 141/76 98.8 °F (37.1 °C) Temporal 104 18 92 %   07/09/21 2132 (!) 159/63   103 18 95 %   07/09/21 1915 (!) 109/57 96.7 °F (35.9 °C) Temporal 101 18 93 %   07/09/21 1718      93 %   07/09/21 1715      (!) 86 %     24HR INTAKE/OUTPUT:      Intake/Output Summary (Last 24 hours) at 7/10/2021 1325  Last data filed at 7/10/2021 0620  Gross per 24 hour   Intake 70 ml   Output 300 ml   Net -230 ml      GENERAL: Awake and alert, not in apparent acute distress. EYES:  No pallor, no icterus. ENT:Oral mucosa mois  LUNGS:  No obvious increased work of breathing, clear to auscultation bilaterally. CARDIOVASCULAR:  S1 and S2 regular to auscultate. ABDOMEN:  Soft, non-tender. Bowel sounds present  NEUROLOGIC:  Alert, and oriented.   S/p left hip surgery  SKIN:  unremarkable  EXTREMITIES: No edema  Data    Recent Labs     07/07/21  1509 07/08/21  0506 07/10/21  0924   WBC 15.9* 12.3* 14.5*   HGB 16.1* 14.8 12.9    216 174     Recent Labs     07/07/21  1509 07/07/21  2350 07/08/21  0506 07/10/21  0622     --  139 140   K 2.9* 3.1* 3.5 4.3     --  103 106   CO2 27  --  27 27   BUN 16  --  16 29*   CREATININE 0.7  --  0.8 0.9   GLUCOSE 138*  --  136* 137*     Recent Labs     07/08/21  0506 07/10/21  0622   AST 17 18   ALT 13 8   BILITOT 0.9 0.4   ALKPHOS 96 82     Recent Labs     07/07/21  1509   CKTOTAL 44     No results for input(s): BNP in the last 72 hours. Recent Labs     07/07/21  2350 07/08/21  0506 07/10/21  0622   PROT  --  6.1* 5.8*   INR 1.2  --   --      Recent Labs     07/07/21  2350   APTT 29.2     XR HUMERUS LEFT (MIN 2 VIEWS)    Result Date: 7/7/2021  EXAMINATION: TWO XRAY VIEWS OF THE LEFT HUMERUS 7/7/2021 2:41 pm COMPARISON: None. HISTORY: ORDERING SYSTEM PROVIDED HISTORY: fall, leg pain TECHNOLOGIST PROVIDED HISTORY: Reason for exam:->fall, leg pain FINDINGS: The exam is somewhat limited especially the lateral projection. Consider repeat study. No grossly displaced fractures are identified in the right humerus. There is suggestion of subluxation of the humeral head. Limited study. No grossly displaced fractures. Mild subluxation of the humeral head. XR HIP RIGHT (2-3 VIEWS)    Result Date: 7/7/2021  EXAMINATION: TWO XRAY VIEWS OF THE RIGHT HIP 7/7/2021 2:41 pm COMPARISON: None. HISTORY: ORDERING SYSTEM PROVIDED HISTORY: fall TECHNOLOGIST PROVIDED HISTORY: Reason for exam:->fall Pain FINDINGS: There is a right total hip arthroplasty. The prosthetic components appear well aligned with no radiographic evidence of loosening. No acute fracture or dislocation is seen. There is degenerative change about the SI joint. No erosive changes seen. 1. No evidence of acute osseous abnormality. 2. Right total hip arthroplasty. XR FEMUR LEFT (MIN 2 VIEWS)    Result Date: 7/7/2021  EXAMINATION: ONE XRAY VIEW OF THE PELVIS AND TWO XRAY VIEWS LEFT HIP;   XRAY VIEWS OF THE LEFT FEMUR 7/7/2021 2:41 pm COMPARISON: None.  HISTORY: ORDERING SYSTEM PROVIDED HISTORY: fall, left hip pain TECHNOLOGIST PROVIDED HISTORY: Reason for exam:->fall, left hip pain FINDINGS: Deformity of the left femoral neck is consistent with a transcervical fracture. The remainder of the left femur is grossly intact. Postoperative changes from total left knee arthroplasty are noted. Transcervical fracture of the left femoral neck. .     XR KNEE LEFT (1-2 VIEWS)    Result Date: 7/7/2021  EXAMINATION: TWO XRAY VIEWS OF THE LEFT KNEE 7/7/2021 2:41 pm COMPARISON: None. HISTORY: ORDERING SYSTEM PROVIDED HISTORY: knee pain TECHNOLOGIST PROVIDED HISTORY: Reason for exam:->knee pain FINDINGS: Satisfactory alignment of left knee arthroplasty. No periprosthetic fracture or evidence of loosening. No obvious synovial effusion. Satisfactory alignment of left knee arthroplasty. No periprosthetic fracture or evidence of loosening. XR KNEE RIGHT (1-2 VIEWS)    Result Date: 7/7/2021  EXAMINATION: TWO XRAY VIEWS OF THE RIGHT KNEE 7/7/2021 2:41 pm COMPARISON: None. HISTORY: ORDERING SYSTEM PROVIDED HISTORY: knee pain TECHNOLOGIST PROVIDED HISTORY: Reason for exam:->knee pain FINDINGS: Mild narrowing of the medial compartment. Chondrocalcinosis associated with medial and lateral compartments. Patella is in normal position. No synovial effusion. No fracture or dislocation of the right knee. No fracture or dislocation of the right knee. CT Head WO Contrast    Result Date: 7/7/2021  EXAMINATION: CT OF THE HEAD WITHOUT CONTRAST; CT OF THE CERVICAL SPINE WITHOUT CONTRAST 7/7/2021 3:40 pm TECHNIQUE: CT of the head was performed without the administration of intravenous contrast. Dose modulation, iterative reconstruction, and/or weight based adjustment of the mA/kV was utilized to reduce the radiation dose to as low as reasonably achievable.; CT of the cervical spine was performed without the administration of intravenous contrast. Multiplanar reformatted images are provided for review.  Dose modulation, iterative reconstruction, and/or weight based adjustment of the mA/kV was utilized to reduce the radiation dose to as low as reasonably achievable. COMPARISON: None. HISTORY: ORDERING SYSTEM PROVIDED HISTORY: fall TECHNOLOGIST PROVIDED HISTORY: Reason for exam:->fall Has a \"code stroke\" or \"stroke alert\" been called? ->No Decision Support Exception - unselect if not a suspected or confirmed emergency medical condition->Emergency Medical Condition (MA) FINDINGS: CT OF THE BRAIN: Evaluation is somewhat limited due to patient motion artifact. BRAIN/VENTRICLES: Two parenchymal hemorrhages are seen in the left parietal lobe, with the larger one measuring 3.1 x 2.1 cm. Mild surrounding edema seen. Small parenchymal hematoma are seen in the bilateral frontal lobes (series 2, image 42 and 44). Allowing for the differences in technique, there is no significant interval change in the size or number of hemorrhages, as compared to the MRI from 06/30/2021. Chronic lacunar infarction is seen in the left caudate head. Mild-to-moderate volume loss and chronic microvascular ischemic changes are seen in the brain. ORBITS: Prosthetic lenses are seen in the globes bilaterally. The orbits are otherwise grossly unremarkable. SINUSES: The visualized paranasal sinuses and mastoid air cells demonstrate no acute abnormality. SOFT TISSUES/SKULL: No acute abnormality of the visualized skull or soft tissues. CT CERVICAL SPINE: BONES/ALIGNMENT: There is no acute fracture or traumatic malalignment. DEGENERATIVE CHANGES: Severe loss of disc heights throughout the cervical spine, most notably at C5-6 and C6-7. Degenerative changes at C6-7 result in at least mild central canal stenosis with severe neural foraminal stenoses. SOFT TISSUES: There is no prevertebral soft tissue swelling. CT head without contrast: 1. No skull fracture or acute intracranial abnormality. 2. Bilateral parenchymal hemorrhages, most prominently in the left parietal lobe.   Allowing for the differences in technique, they are stable as of the MRI of the brain from 06/30/2021. 3. The subarachnoid blood identified on the previous MRI is not evident on the current study. CT cervical spine without contrast: 1. No fracture or joint dislocation is seen. 2. Degenerative changes, as described. CT Cervical Spine WO Contrast    Result Date: 7/7/2021  EXAMINATION: CT OF THE HEAD WITHOUT CONTRAST; CT OF THE CERVICAL SPINE WITHOUT CONTRAST 7/7/2021 3:40 pm TECHNIQUE: CT of the head was performed without the administration of intravenous contrast. Dose modulation, iterative reconstruction, and/or weight based adjustment of the mA/kV was utilized to reduce the radiation dose to as low as reasonably achievable.; CT of the cervical spine was performed without the administration of intravenous contrast. Multiplanar reformatted images are provided for review. Dose modulation, iterative reconstruction, and/or weight based adjustment of the mA/kV was utilized to reduce the radiation dose to as low as reasonably achievable. COMPARISON: None. HISTORY: ORDERING SYSTEM PROVIDED HISTORY: fall TECHNOLOGIST PROVIDED HISTORY: Reason for exam:->fall Has a \"code stroke\" or \"stroke alert\" been called? ->No Decision Support Exception - unselect if not a suspected or confirmed emergency medical condition->Emergency Medical Condition (MA) FINDINGS: CT OF THE BRAIN: Evaluation is somewhat limited due to patient motion artifact. BRAIN/VENTRICLES: Two parenchymal hemorrhages are seen in the left parietal lobe, with the larger one measuring 3.1 x 2.1 cm. Mild surrounding edema seen. Small parenchymal hematoma are seen in the bilateral frontal lobes (series 2, image 42 and 44). Allowing for the differences in technique, there is no significant interval change in the size or number of hemorrhages, as compared to the MRI from 06/30/2021. Chronic lacunar infarction is seen in the left caudate head.  Mild-to-moderate volume loss and chronic microvascular ischemic changes are seen in the brain. ORBITS: Prosthetic lenses are seen in the globes bilaterally. The orbits are otherwise grossly unremarkable. SINUSES: The visualized paranasal sinuses and mastoid air cells demonstrate no acute abnormality. SOFT TISSUES/SKULL: No acute abnormality of the visualized skull or soft tissues. CT CERVICAL SPINE: BONES/ALIGNMENT: There is no acute fracture or traumatic malalignment. DEGENERATIVE CHANGES: Severe loss of disc heights throughout the cervical spine, most notably at C5-6 and C6-7. Degenerative changes at C6-7 result in at least mild central canal stenosis with severe neural foraminal stenoses. SOFT TISSUES: There is no prevertebral soft tissue swelling. CT head without contrast: 1. No skull fracture or acute intracranial abnormality. 2. Bilateral parenchymal hemorrhages, most prominently in the left parietal lobe. Allowing for the differences in technique, they are stable as of the MRI of the brain from 06/30/2021. 3. The subarachnoid blood identified on the previous MRI is not evident on the current study. CT cervical spine without contrast: 1. No fracture or joint dislocation is seen. 2. Degenerative changes, as described. XR CHEST PORTABLE    Result Date: 7/7/2021  EXAMINATION: ONE XRAY VIEW OF THE CHEST 7/7/2021 2:41 pm COMPARISON: 06/29/2021 HISTORY: ORDERING SYSTEM PROVIDED HISTORY: fall TECHNOLOGIST PROVIDED HISTORY: Reason for exam:->fall FINDINGS: The chest x-ray is rotated. There are no findings of failure. The right lung is clear. Left upper lobe is clear. The left lung base is poorly visualized due to rotation overlying soft tissues as well as the overlying left heart border. 1. There are no findings of failure 2. The right lung and left upper lobe are clear. XR HIP 2-3 VW W PELVIS LEFT    Result Date: 7/7/2021  EXAMINATION: ONE XRAY VIEW OF THE PELVIS AND TWO XRAY VIEWS LEFT HIP;   XRAY VIEWS OF THE LEFT FEMUR 7/7/2021 2:41 pm COMPARISON: None.  HISTORY: ORDERING

## 2021-07-11 VITALS
SYSTOLIC BLOOD PRESSURE: 149 MMHG | RESPIRATION RATE: 18 BRPM | WEIGHT: 140.6 LBS | OXYGEN SATURATION: 90 % | HEART RATE: 85 BPM | HEIGHT: 61 IN | DIASTOLIC BLOOD PRESSURE: 67 MMHG | BODY MASS INDEX: 26.55 KG/M2 | TEMPERATURE: 96.5 F

## 2021-07-11 LAB
HCT VFR BLD CALC: 46.6 % (ref 34–48)
HEMOGLOBIN: 14.6 G/DL (ref 11.5–15.5)
MCH RBC QN AUTO: 27.4 PG (ref 26–35)
MCHC RBC AUTO-ENTMCNC: 31.3 % (ref 32–34.5)
MCV RBC AUTO: 87.4 FL (ref 80–99.9)
PDW BLD-RTO: 14.4 FL (ref 11.5–15)
PLATELET # BLD: 127 E9/L (ref 130–450)
PMV BLD AUTO: 11.1 FL (ref 7–12)
RBC # BLD: 5.33 E12/L (ref 3.5–5.5)
SARS-COV-2, NAAT: NOT DETECTED
WBC # BLD: 12.3 E9/L (ref 4.5–11.5)

## 2021-07-11 PROCEDURE — 6370000000 HC RX 637 (ALT 250 FOR IP): Performed by: INTERNAL MEDICINE

## 2021-07-11 PROCEDURE — 85027 COMPLETE CBC AUTOMATED: CPT

## 2021-07-11 PROCEDURE — 97166 OT EVAL MOD COMPLEX 45 MIN: CPT

## 2021-07-11 PROCEDURE — 36415 COLL VENOUS BLD VENIPUNCTURE: CPT

## 2021-07-11 PROCEDURE — 6370000000 HC RX 637 (ALT 250 FOR IP): Performed by: PHYSICIAN ASSISTANT

## 2021-07-11 PROCEDURE — 6370000000 HC RX 637 (ALT 250 FOR IP): Performed by: STUDENT IN AN ORGANIZED HEALTH CARE EDUCATION/TRAINING PROGRAM

## 2021-07-11 PROCEDURE — 87635 SARS-COV-2 COVID-19 AMP PRB: CPT

## 2021-07-11 PROCEDURE — 97530 THERAPEUTIC ACTIVITIES: CPT

## 2021-07-11 PROCEDURE — 2580000003 HC RX 258: Performed by: PHYSICIAN ASSISTANT

## 2021-07-11 PROCEDURE — 2700000000 HC OXYGEN THERAPY PER DAY

## 2021-07-11 RX ADMIN — MAGNESIUM HYDROXIDE 30 ML: 2400 SUSPENSION ORAL at 05:48

## 2021-07-11 RX ADMIN — PSYLLIUM HUSK 1 PACKET: 3.4 GRANULE ORAL at 08:23

## 2021-07-11 RX ADMIN — ACETAMINOPHEN 650 MG: 325 TABLET ORAL at 08:25

## 2021-07-11 RX ADMIN — OXYCODONE HYDROCHLORIDE 5 MG: 5 TABLET ORAL at 05:49

## 2021-07-11 RX ADMIN — OXYCODONE HYDROCHLORIDE 5 MG: 5 TABLET ORAL at 13:22

## 2021-07-11 RX ADMIN — BISACODYL 5 MG: 5 TABLET, COATED ORAL at 05:49

## 2021-07-11 RX ADMIN — Medication 10 ML: at 09:55

## 2021-07-11 RX ADMIN — AMLODIPINE BESYLATE 5 MG: 5 TABLET ORAL at 08:22

## 2021-07-11 RX ADMIN — MAGNESIUM CITRATE 296 ML: 1.75 LIQUID ORAL at 09:43

## 2021-07-11 RX ADMIN — ESCITALOPRAM 5 MG: 10 TABLET, FILM COATED ORAL at 08:24

## 2021-07-11 RX ADMIN — POLYETHYLENE GLYCOL 3350 17 G: 17 POWDER, FOR SOLUTION ORAL at 05:48

## 2021-07-11 ASSESSMENT — PAIN SCALES - GENERAL
PAINLEVEL_OUTOF10: 0
PAINLEVEL_OUTOF10: 7
PAINLEVEL_OUTOF10: 4
PAINLEVEL_OUTOF10: 6
PAINLEVEL_OUTOF10: 0
PAINLEVEL_OUTOF10: 2

## 2021-07-11 NOTE — PROGRESS NOTES
Daughter and patient's chart state patient has not had a BM since 7/2/21. Dr Cletus Bumpers is aware. Patient can be DC'd without having a BM. Nurse obtained order for Mag Citrate per daughters request. PRN bowel medications have been given also.

## 2021-07-11 NOTE — PROGRESS NOTES
[address] [place] [address] Ave  [address] [place] [address]      Date:2021                 Patient Name: Bella Terry  MRN: [id number]  : 1926  Room: [address] [place] [address]    Referring Provider: NASIM Murry CNP  Specific Provider Orders/Date: OT evaluation and treat 21    Evaluating OT: Angie Lombardo. Stephenie, OTR/SARAN #[id number]    Diagnosis: L hip fx.  S/p fall and recent CVA      Surgery: L [facility] 21    Pertinent Medical History: HTN, CVA    Precautions:  Fall Risk, aphasia, safety, TSM, WBAT LLE, [place] [facility] hearing aides, cognition  Assessment of current deficits   [x] Functional mobility  [x]ADLs  [x] Strength               [x]Cognition   [x] Functional transfers   [x] IADLs         [x] Safety Awareness   [x]Endurance   [] Fine Coordination              [x] Balance      [x] Vision/perception   []Sensation    [x]Gross Motor Coordination  [] ROM  [] Delirium                   [] Motor Control     OT PLAN OF CARE   OT POC based on physician orders, patient diagnosis and results of clinical assessment    Frequency/Duration   2-3 days/wk for 1 week PRN   Specific OT Treatment Interventions to include:   * Instruction/training on adapted ADL techniques and AE recommendations to increase functional independence within precautions       * Training on energy conservation strategies, correct breathing pattern and techniques to improve independence/tolerance for self-care routine  * Functional transfer/mobility training/DME recommendations for increased independence, safety, and fall prevention  * Patient/Family education to increase follow through with safety techniques and functional independence  * Recommendation of environmental modifications for increased safety with functional transfers/mobility and ADLs  * Therapeutic exercise to improve motor endurance, ROM, and functional strength for ADLs/functional transfers    OTMRS Modified [place] Scale (MRS)  Score     Description  0             No symptoms  1             No significant disability despite symptoms  2             Slight disability; able to look after own affairs  3             Moderate disability; able to ambulate without assist/ requires assist with ADLs  4             Moderate/Severe disability;requires assist to ambulate/assist with ADLs  5             Severe disability;bedridden/incontinent   6               Score:   5    Recommended Adaptive Equipment: TBD     Home Living: Pt lives with daughter and ARLET recently from [place] in a 2 story home with no steps and no hand rails. Bed and bath on main floor.   Bathroom setup: unable to state   Equipment owned: ww, [employer] x2    Prior Level of Function: assisted  with ADLs , assisted with IADLs; ambulated ww and SBQC for bathroom  Driving: no  Occupation: retired RN  Medication management: setup  Leisure: unable to state    Pain Level: noted min L hip pain with bed mobility and sit to stand activity   Cognition: A&O: 1/4; Follows 0 step directions   Memory:  poor   Sequencing:  poor   Problem solving:  poor   Judgement/safety:  poor     Functional Assessment:  AM-PAC Daily Activity Raw Score:    Initial Eval Status  Date: 21 Treatment Status  Date: STGs = LTGs  Time frame:  2-4days   Feeding Max A attempted to drink from straw unable to due to strength   Min A    Grooming Max A for face , hand and hair care, attempted with poor follow through of task  Min A    UB Dressing Max A   Min A    LB Dressing dependent  Max A    Bathing dependent  Max A   Toileting Dependent catheter  Max A to BSC   Bed Mobility  Supine to sit: max A x2  Sit to supine:  Max A x2  Supine to sit: mod A  Sit to supine: mod  A    Functional Transfers Sit to stand with ww max A x2 x2  Mod A with ww   Functional Mobility Unable to take steps  tba   Balance Sitting:     Static:  Mod A with posterior lean    Dynamic:max A Visual Perceptual Skills for increased safety and independence with ADLs.   Proper Positioning/Alignment for hip and pelvis in activity        Rehab Potential: good for established goals     Patient / Family Goal: [facility]      Patient and/or family were instructed on functional diagnosis, prognosis/goals and OT plan of care. Demonstrated poor understanding. Eval Complexity: mod    Time In: 7:55  Time Out: 8:20  Total Treatment Time: 10 min. Min Units   OT Eval Low 02486       OT Eval Medium 82846  X    OT Eval High [id number]       OT Re-Eval [id number]       Therapeutic Ex [id number]       Therapeutic Activities 60244  10  1   ADL/Self Care 20119       Orthotic Management 58275       Neuro Re-Ed 44270       Non-Billable Time          Evaluation Time includes thorough review of current medical information, gathering information on past medical history/social history and prior level of function, completion of standardized testing/informal observation of tasks, assessment of data and education on plan of care and goals. Jessica Gandara.  [address] 72, Leny 70

## 2021-07-11 NOTE — CARE COORDINATION
Care Coordination-   The patient is set up to return to [facility] skilled today at 3 pm via [facility]. She has a negative covid. Envelope is done. The nurse notified the daughter who was in the patients room.  I will follow

## 2021-07-11 NOTE — PROGRESS NOTES
DR mendieta, ortho gave orders to keep brantley catheter in per daughter's request. Alivia nurse from [facility] is aware and will manage at facility.

## 2021-07-11 NOTE — PROGRESS NOTES
Department of Orthopedic Surgery  Resident Progress Note    Patient seen and examined. She has been resting comfortably this morning. Pain controlled with acetaminophen. Patient has not had a bowel movement for a few days. No chest pain or difficulty breathing. No nausea or vomiting. VITALS:  /82   Pulse 76   Temp 97.1 °F (36.2 °C) (Temporal)   Resp 16   Ht 5' 1\" (1.549 m)   Wt 140 lb 9.6 oz (63.8 kg)   SpO2 90%   BMI 26.57 kg/m²     General: resting, no acute distress    MUSCULOSKELETAL:   left lower extremity:  · Dressing C/D/I, minimal drainage  · Compartments soft and compressible  · +PF/DF/EHL  · +2/4 DP & PT pulses, Brisk Cap refill, Toes warm and perfused  · Distal sensation grossly intact to Peroneals, Sural, Saphenous, and tibial nrs    CBC:   Lab Results   Component Value Date    WBC 12.3 07/11/2021    HGB 14.6 07/11/2021    HCT 46.6 07/11/2021     07/11/2021     PT/INR:    Lab Results   Component Value Date    PROTIME 13.3 07/07/2021    INR 1.2 07/07/2021       ASSESSMENT  · S/P left hip hemiarthroplasty 7/9/21    PLAN      · Appreciate physical therapy evaluation, WBAT LLE  · 24 hour abx coverage  · Deep venous thrombosis prophylaxis - per medicine given patient history, early mobilization  · Pain Control: IV and PO  · Monitor H&H 12.9  · D/C Plan:  PT/OT/SW  · Ok to discharge from an orthopedic stand point when coordinated.

## 2021-07-11 NOTE — DISCHARGE SUMMARY
Physician Discharge Summary     Patient ID:  Fred Moreno  [id number]  11/27/1926  Admit date: 7/7/2021  Discharge date and time: No discharge date for patient encounter. Admitting Physician: Irene Peñaloza MD   Admission Diagnoses:   Closed left hip fracture, [facility] [S72.002A]  Medications Prior to Admission:    Medications Prior to Admission: polyethylene glycol (GLYCOLAX) 17 g packet, Take 17 g by mouth daily as needed for Constipation  bisacodyl (DULCOLAX) 5 MG EC tablet, Take 5 mg by mouth daily as needed for Constipation  psyllium (KONSYL) 28.3 % PACK, Take 1 packet by mouth daily  escitalopram (LEXAPRO) 5 MG tablet, TAKE ONE TABLET BY MOUTH ONCE A DAY  amLODIPine (NORVASC) 5 MG tablet, Take 5 mg by mouth as needed   Discharge Diagnoses:   Principal Problem:    Closed left hip fracture, initial encounter [facility])  Discharge Medcations:      Medication List      START taking these medications    HYDROcodone-acetaminophen 5-325 MG per tablet  Commonly known as: Norco  Take 1 tablet by mouth every 4 hours as needed for Pain for up to 7 days. Intended supply: 7 days.  Take lowest dose possible to manage pain        CONTINUE taking these medications    amLODIPine 5 MG tablet  Commonly known as: NORVASC     bisacodyl 5 MG EC tablet  Commonly known as: DULCOLAX     escitalopram 5 MG tablet  Commonly known as: LEXAPRO     polyethylene glycol 17 g packet  Commonly known as: GLYCOLAX     psyllium 28.3 % Pack  Commonly known as: Kamryn Gens           Where to Get Your Medications      You can get these medications from any pharmacy    Bring a paper prescription for each of these medications  · HYDROcodone-acetaminophen 5-325 MG per tablet       Admission Condition: Fair  Discharged Condition: Stable  Physical Examination:  Patient Vitals for the past 24 hrs:   BP Temp Temp src Pulse Resp SpO2   07/11/21 0815 130/82 97.1 °F (36.2 °C) Temporal 76 16 90 %   07/11/21 0415 137/81 97.8 °F (36.6 °C) Temporal 91 16 93 % 07/11/21 0030 133/63 98.1 °F (36.7 °C) Temporal 87 14 93 %   07/10/21 2000 130/65 98.3 °F (36.8 °C) Temporal 86 18 91 %   07/10/21 1808 (!) 128/58 98.8 °F (37.1 °C) Temporal 98 20 90 %   07/10/21 1555      90 %   07/10/21 1500 120/75 98.9 °F (37.2 °C) Temporal 98 16 92 %   07/10/21 1147 (!) 104/58 98.6 °F (37 °C) Temporal 92 16 92 %     Sleeping  HEAD: Atraumatic, normo cephalict  LUNGS:  Clear to auscultation bilaterally. CARDIOVASCULAR:  S1 and S2 regular to auscultate. ABDOMEN: Soft. Bowel sounds present  NEUROLOGIC: Sleeping    Hospital Course: Had reaction with Norco/pain medications    Recent Labs     07/10/21  0924 07/11/21  0641   WBC 14.5* 12.3*   HGB 12.9 14.6    127*     Recent Labs     07/10/21  0622      K 4.3      CO2 27   BUN 29*   CREATININE 0.9   GLUCOSE 137*     Recent Labs     07/10/21  0622   AST 18   ALT 8   ALKPHOS 82   BILITOT 0.4     XR HUMERUS LEFT (MIN 2 VIEWS)    Result Date: 7/7/2021  EXAMINATION: TWO XRAY VIEWS OF THE LEFT HUMERUS 7/7/2021 2:41 pm COMPARISON: None. HISTORY: ORDERING SYSTEM PROVIDED HISTORY: fall, leg pain TECHNOLOGIST PROVIDED HISTORY: Reason for exam:->fall, leg pain FINDINGS: The exam is somewhat limited especially the lateral projection. Consider repeat study. No grossly displaced fractures are identified in the right humerus. There is suggestion of subluxation of the humeral head. Limited study. No grossly displaced fractures. Mild subluxation of the humeral head. XR HIP RIGHT (2-3 VIEWS)    Result Date: 7/7/2021  EXAMINATION: TWO XRAY VIEWS OF THE RIGHT HIP 7/7/2021 2:41 pm COMPARISON: None. HISTORY: ORDERING SYSTEM PROVIDED HISTORY: fall TECHNOLOGIST PROVIDED HISTORY: Reason for exam:->fall Pain FINDINGS: There is a right total hip arthroplasty. The prosthetic components appear well aligned with no radiographic evidence of loosening. No acute fracture or dislocation is seen.   There is degenerative change about the SI joint. No erosive changes seen. 1. No evidence of acute osseous abnormality. 2. Right total hip arthroplasty. XR FEMUR LEFT (MIN 2 VIEWS)    Result Date: 7/7/2021  EXAMINATION: ONE XRAY VIEW OF THE PELVIS AND TWO XRAY VIEWS LEFT HIP;   XRAY VIEWS OF THE LEFT FEMUR 7/7/2021 2:41 pm COMPARISON: None. HISTORY: ORDERING SYSTEM PROVIDED HISTORY: fall, left hip pain TECHNOLOGIST PROVIDED HISTORY: Reason for exam:->fall, left hip pain FINDINGS: Deformity of the left femoral neck is consistent with a transcervical fracture. The remainder of the left femur is grossly intact. Postoperative changes from total left knee arthroplasty are noted. Transcervical fracture of the left femoral neck. .     XR KNEE LEFT (1-2 VIEWS)    Result Date: 7/7/2021  EXAMINATION: TWO XRAY VIEWS OF THE LEFT KNEE 7/7/2021 2:41 pm COMPARISON: None. HISTORY: ORDERING SYSTEM PROVIDED HISTORY: knee pain TECHNOLOGIST PROVIDED HISTORY: Reason for exam:->knee pain FINDINGS: Satisfactory alignment of left knee arthroplasty. No periprosthetic fracture or evidence of loosening. No obvious synovial effusion. Satisfactory alignment of left knee arthroplasty. No periprosthetic fracture or evidence of loosening. XR KNEE RIGHT (1-2 VIEWS)    Result Date: 7/7/2021  EXAMINATION: TWO XRAY VIEWS OF THE RIGHT KNEE 7/7/2021 2:41 pm COMPARISON: None. HISTORY: ORDERING SYSTEM PROVIDED HISTORY: knee pain TECHNOLOGIST PROVIDED HISTORY: Reason for exam:->knee pain FINDINGS: Mild narrowing of the medial compartment. Chondrocalcinosis associated with medial and lateral compartments. Patella is in normal position. No synovial effusion. No fracture or dislocation of the right knee. No fracture or dislocation of the right knee.      CT Head WO Contrast    Result Date: 7/7/2021  EXAMINATION: CT OF THE HEAD WITHOUT CONTRAST; CT OF THE CERVICAL SPINE WITHOUT CONTRAST 7/7/2021 3:40 pm TECHNIQUE: CT of the head was performed without the administration of intravenous contrast. Dose modulation, iterative reconstruction, and/or weight based adjustment of the mA/kV was utilized to reduce the radiation dose to as low as reasonably achievable.; CT of the cervical spine was performed without the administration of intravenous contrast. Multiplanar reformatted images are provided for review. Dose modulation, iterative reconstruction, and/or weight based adjustment of the mA/kV was utilized to reduce the radiation dose to as low as reasonably achievable. COMPARISON: None. HISTORY: ORDERING SYSTEM PROVIDED HISTORY: fall TECHNOLOGIST PROVIDED HISTORY: Reason for exam:->fall Has a \"code stroke\" or \"stroke alert\" been called? ->No Decision Support Exception - unselect if not a suspected or confirmed emergency medical condition->Emergency Medical Condition (MA) FINDINGS: CT OF THE BRAIN: Evaluation is somewhat limited due to patient motion artifact. BRAIN/VENTRICLES: Two parenchymal hemorrhages are seen in the left parietal lobe, with the larger one measuring 3.1 x 2.1 cm. Mild surrounding edema seen. Small parenchymal hematoma are seen in the bilateral frontal lobes (series 2, image 42 and 44). Allowing for the differences in technique, there is no significant interval change in the size or number of hemorrhages, as compared to the MRI from 06/30/2021. Chronic lacunar infarction is seen in the left caudate head. Mild-to-moderate volume loss and chronic microvascular ischemic changes are seen in the brain. ORBITS: Prosthetic lenses are seen in the globes bilaterally. The orbits are otherwise grossly unremarkable. SINUSES: The visualized paranasal sinuses and mastoid air cells demonstrate no acute abnormality. SOFT TISSUES/SKULL: No acute abnormality of the visualized skull or soft tissues. CT CERVICAL SPINE: BONES/ALIGNMENT: There is no acute fracture or traumatic malalignment.  DEGENERATIVE CHANGES: Severe loss of disc heights throughout the cervical spine, most notably at C5-6 and C6-7. Degenerative changes at C6-7 result in at least mild central canal stenosis with severe neural foraminal stenoses. SOFT TISSUES: There is no prevertebral soft tissue swelling. CT head without contrast: 1. No skull fracture or acute intracranial abnormality. 2. Bilateral parenchymal hemorrhages, most prominently in the left parietal lobe. Allowing for the differences in technique, they are stable as of the MRI of the brain from 06/30/2021. 3. The subarachnoid blood identified on the previous MRI is not evident on the current study. CT cervical spine without contrast: 1. No fracture or joint dislocation is seen. 2. Degenerative changes, as described. CT Cervical Spine WO Contrast    Result Date: 7/7/2021  EXAMINATION: CT OF THE HEAD WITHOUT CONTRAST; CT OF THE CERVICAL SPINE WITHOUT CONTRAST 7/7/2021 3:40 pm TECHNIQUE: CT of the head was performed without the administration of intravenous contrast. Dose modulation, iterative reconstruction, and/or weight based adjustment of the mA/kV was utilized to reduce the radiation dose to as low as reasonably achievable.; CT of the cervical spine was performed without the administration of intravenous contrast. Multiplanar reformatted images are provided for review. Dose modulation, iterative reconstruction, and/or weight based adjustment of the mA/kV was utilized to reduce the radiation dose to as low as reasonably achievable. COMPARISON: None. HISTORY: ORDERING SYSTEM PROVIDED HISTORY: fall TECHNOLOGIST PROVIDED HISTORY: Reason for exam:->fall Has a \"code stroke\" or \"stroke alert\" been called? ->No Decision Support Exception - unselect if not a suspected or confirmed emergency medical condition->Emergency Medical Condition (MA) FINDINGS: CT OF THE BRAIN: Evaluation is somewhat limited due to patient motion artifact. BRAIN/VENTRICLES: Two parenchymal hemorrhages are seen in the left parietal lobe, with the larger one measuring 3.1 x 2.1 cm.   Mild surrounding edema seen. Small parenchymal hematoma are seen in the bilateral frontal lobes (series 2, image 42 and 44). Allowing for the differences in technique, there is no significant interval change in the size or number of hemorrhages, as compared to the MRI from 06/30/2021. Chronic lacunar infarction is seen in the left caudate head. Mild-to-moderate volume loss and chronic microvascular ischemic changes are seen in the brain. ORBITS: Prosthetic lenses are seen in the globes bilaterally. The orbits are otherwise grossly unremarkable. SINUSES: The visualized paranasal sinuses and mastoid air cells demonstrate no acute abnormality. SOFT TISSUES/SKULL: No acute abnormality of the visualized skull or soft tissues. CT CERVICAL SPINE: BONES/ALIGNMENT: There is no acute fracture or traumatic malalignment. DEGENERATIVE CHANGES: Severe loss of disc heights throughout the cervical spine, most notably at C5-6 and C6-7. Degenerative changes at C6-7 result in at least mild central canal stenosis with severe neural foraminal stenoses. SOFT TISSUES: There is no prevertebral soft tissue swelling. CT head without contrast: 1. No skull fracture or acute intracranial abnormality. 2. Bilateral parenchymal hemorrhages, most prominently in the left parietal lobe. Allowing for the differences in technique, they are stable as of the MRI of the brain from 06/30/2021. 3. The subarachnoid blood identified on the previous MRI is not evident on the current study. CT cervical spine without contrast: 1. No fracture or joint dislocation is seen. 2. Degenerative changes, as described. XR CHEST PORTABLE    Result Date: 7/7/2021  EXAMINATION: ONE XRAY VIEW OF THE CHEST 7/7/2021 2:41 pm COMPARISON: 06/29/2021 HISTORY: ORDERING SYSTEM PROVIDED HISTORY: fall TECHNOLOGIST PROVIDED HISTORY: Reason for exam:->fall FINDINGS: The chest x-ray is rotated. There are no findings of failure. The right lung is clear.   Left upper lobe is clear.  The left lung base is poorly visualized due to rotation overlying soft tissues as well as the overlying left heart border. 1. There are no findings of failure 2. The right lung and left upper lobe are clear. XR HIP 2-3 VW W PELVIS LEFT    Result Date: 7/7/2021  EXAMINATION: ONE XRAY VIEW OF THE PELVIS AND TWO XRAY VIEWS LEFT HIP;   XRAY VIEWS OF THE LEFT FEMUR 7/7/2021 2:41 pm COMPARISON: None. HISTORY: ORDERING SYSTEM PROVIDED HISTORY: fall, left hip pain TECHNOLOGIST PROVIDED HISTORY: Reason for exam:->fall, left hip pain FINDINGS: Deformity of the left femoral neck is consistent with a transcervical fracture. The remainder of the left femur is grossly intact. Postoperative changes from total left knee arthroplasty are noted. Transcervical fracture of the left femoral neck. .     Consults: orthopedic surgery  Significant Diagnostic Studies: radiology  Treatmentss/p left hip hemiarthroplasty on 7/9/2021   Disposition:rehab  Patient Instructions: Activity: activity as advised  Diet: Regular diet Wound Care: As advised  Follow-up with Almaz Cadena MD within 1 week.   F/u blood work as advised/needed  Follow-up with consultants as recommended by them    Note that over 30 minutes was spent in preparing discharge papers, discussing discharge, medication review, prescription if needed etc.      Electronically signed by Afia Barrios MD on 7/11/2021 at 9:09 AM

## 2021-07-26 ENCOUNTER — TELEPHONE (OUTPATIENT)
Dept: ORTHOPEDIC SURGERY | Age: 86
End: 2021-07-26

## 2021-07-26 NOTE — TELEPHONE ENCOUNTER
Order for cavalier xrays faxed to [facility] and [address] [place] [address] 2 ([phone])

## 2021-07-26 NOTE — TELEPHONE ENCOUNTER
Jesse calling from [facility] in regards to the appointment scheduled for today. The facility has had a positive covid case and they would like to know if this appointment can be changed to a virtual visit. Reached out to office in teams - was advised to send PE. Please follow up with Jesse at [facility] to reschedule.  Thank you

## 2021-07-29 ENCOUNTER — TELEPHONE (OUTPATIENT)
Dept: ORTHOPEDIC SURGERY | Age: 86
End: 2021-07-29

## 2021-07-29 NOTE — TELEPHONE ENCOUNTER
Date of Procedure: 7/9/2021  Procedure(s): Left hip HEMIARTHROPLASTY  Surgeon(s): Dina Smallwood, DO    Patient 3 weeks s/p left cemented hemiarthroplasty. WBAT  To evaluate sutures and remove.  DSD daily until no drainage from incision   Keep incision dry x 24 hours from suture removal. No submerging in water until all scabs healed    R/S in 3-4 weeks in clinic

## 2021-07-30 ENCOUNTER — APPOINTMENT (OUTPATIENT)
Dept: CT IMAGING | Age: 86
End: 2021-07-30
Payer: MEDICARE

## 2021-07-30 ENCOUNTER — APPOINTMENT (OUTPATIENT)
Dept: GENERAL RADIOLOGY | Age: 86
End: 2021-07-30
Payer: MEDICARE

## 2021-07-30 ENCOUNTER — HOSPITAL ENCOUNTER (EMERGENCY)
Age: 86
Discharge: HOME | End: 2021-07-30
Attending: EMERGENCY MEDICINE
Payer: MEDICARE

## 2021-07-30 VITALS
HEART RATE: 97 BPM | TEMPERATURE: 98.4 F | SYSTOLIC BLOOD PRESSURE: 108 MMHG | OXYGEN SATURATION: 98 % | WEIGHT: 140 LBS | RESPIRATION RATE: 16 BRPM | BODY MASS INDEX: 21.97 KG/M2 | HEIGHT: 67 IN | DIASTOLIC BLOOD PRESSURE: 66 MMHG
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
## 2021-07-30 DIAGNOSIS — S09.90XA CLOSED HEAD INJURY, INITIAL ENCOUNTER: Primary | ICD-10-CM

## 2021-07-30 PROCEDURE — 99283 EMERGENCY DEPT VISIT LOW MDM: CPT

## 2021-07-30 PROCEDURE — 73502 X-RAY EXAM HIP UNI 2-3 VIEWS: CPT

## 2021-07-30 PROCEDURE — 70450 CT HEAD/BRAIN W/O DYE: CPT

## 2021-07-30 NOTE — ED PROVIDER NOTES
HPI:  7/30/21,   Time: 8:40 AM EDT       Shabana Umaña is a 80 y.o. female presenting to the ED for unwitnessed fall, beginning 1 hr ago. The complaint has been intermittent, mild in severity, and worsened by nothing. Recent left hip replacement, fell today at [facility], not witnessed, pt demented, hx limited, bib ems. Concern for issues with hip    Review of Systems:   Pertinent positives and negatives are stated within HPI, all other systems reviewed and are negative.          --------------------------------------------- PAST HISTORY ---------------------------------------------  Past Medical History:  has a past medical history of Cerebral artery occlusion with cerebral infarction ([facility] Hx of blood clots, Hyperlipidemia, and Hypertension. Past Surgical History:  has a past surgical history that includes Appendectomy; Hysterectomy; bladder repair; Cataract removal; eye surgery; joint replacement; and hip surgery (Left, 7/9/2021). Social History:  reports that she has never smoked. She has never used smokeless tobacco. She reports that she does not drink alcohol and does not use drugs. Family History: family history is not on file. The patients home medications have been reviewed. Allergies: Lisinopril, Statins, and Ciprofloxacin        ---------------------------------------------------PHYSICAL EXAM--------------------------------------    Constitutional/General: Alert and oriented x1, well appearing, non toxic in NAD  Head: Normocephalic and atraumatic  Eyes: PERRL, EOMI, conjunctive normal, sclera non icteric  Mouth: Oropharynx clear, handling secretions, no trismus, no asymmetry of the posterior oropharynx or uvular edema  Neck: Supple, full ROM, non tender to palpation in the midline, no stridor, no crepitus, no meningeal signs  Respiratory:  Not in respiratory distress  Cardiovascular:   2+ distal pulses  Chest: No chest wall tenderness  GI:  Abdomen Soft, Non tender, Non distended. hemodynamically stable during their ED course. Re-Evaluations:             Re-evaluation. Patients symptoms show no change    Re-examination                   Critical Care:         Counseling: The emergency provider has spoken with the patient and discussed todays results, in addition to providing specific details for the plan of care and counseling regarding the diagnosis and prognosis. Questions are answered at this time and they are agreeable with the plan.       --------------------------------- IMPRESSION AND DISPOSITION ---------------------------------    IMPRESSION  1. Closed head injury, initial encounter    2. Fall on same level from slipping, tripping or stumbling, initial encounter        DISPOSITION  Disposition: Discharge to nursing home  Patient condition is stable    NOTE: This report was transcribed using voice recognition software.  Every effort was made to ensure accuracy; however, inadvertent computerized transcription errors may be present        Rupert Corado MD  07/30/21 7207

## 2021-07-30 NOTE — TELEPHONE ENCOUNTER
Patient went to ED today for a fall. L hip and pelvis XR taken today does not demonstrate any appreciable loosening or failure of her L hip hemiarthroplasty. As long as patient was not diagnosed with COVID and is still asymptomatic, we can still see her in office for evaluation.

## 2021-08-02 NOTE — TELEPHONE ENCOUNTER
Frederick Patel from [facility] called office. Discussed future appointment. Encouraged to call with further questions or concerns.       Future Appointments   Date Time Provider Rashawn Owens   8/23/2021  1:15 PM SE AKSHAT Rashid [facility]   9/2/2021  8:20 AM MD Reno Castillo [facility]
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
## 2021-08-23 ENCOUNTER — OFFICE VISIT (OUTPATIENT)
Dept: ORTHOPEDIC SURGERY | Age: 86
End: 2021-08-23
Payer: MEDICARE

## 2021-08-23 ENCOUNTER — HOSPITAL ENCOUNTER (OUTPATIENT)
Dept: GENERAL RADIOLOGY | Age: 86
Discharge: HOME | End: 2021-08-25
Payer: MEDICARE

## 2021-08-23 VITALS — TEMPERATURE: 96.9 F

## 2021-08-23 DIAGNOSIS — Z96.649 S/P HIP HEMIARTHROPLASTY: ICD-10-CM

## 2021-08-23 DIAGNOSIS — Z96.649 S/P HIP HEMIARTHROPLASTY: Primary | ICD-10-CM

## 2021-08-23 PROCEDURE — 99212 OFFICE O/P EST SF 10 MIN: CPT | Performed by: PHYSICIAN ASSISTANT

## 2021-08-23 PROCEDURE — 73502 X-RAY EXAM HIP UNI 2-3 VIEWS: CPT

## 2021-08-23 PROCEDURE — 99024 POSTOP FOLLOW-UP VISIT: CPT | Performed by: PHYSICIAN ASSISTANT

## 2021-08-23 RX ORDER — DOCUSATE SODIUM 100 MG/1
100 CAPSULE, LIQUID FILLED ORAL DAILY
COMMUNITY

## 2021-08-23 RX ORDER — POTASSIUM CHLORIDE 750 MG/1
20 TABLET, FILM COATED, EXTENDED RELEASE ORAL DAILY
COMMUNITY

## 2021-08-23 NOTE — PROGRESS NOTES
Chief Complaint   Patient presents with    Follow Up After Procedure     L hip alyssa 07/09/21        OP:SURGEON: Dr. Joel Perales,   DATE OF PROCEDURE: 7-9-21  PROCEDURE: Left hip hemiarthroplasty    POD: 6 weeks    Subjective:  Matthew Hicks is following up from the above surgery. She is WBAT on left upper extremity. She ambulates with assistive device, wheelchair. Pain to extremity is none and is not taking prescribed pain medication. They denies numbness or tingling to the left lower extremity. Denies calf pain, chest pain, or shortness of breath. Patient has finished DVT prophylaxis. Patient is participating in therapy at the skilled nursing facility. Patient presents today from the skilled nursing facility with no family or caretaker. Patient has dementia and is not able to provide any significant history today. Review of Systems -  All pertinent positives/negative in HPI     Objective:    General: Alert and oriented X 3, normocephalic atraumatic, external ears and eye normal, sclera clear, no acute distress, respirations easy and unlabored with no audible wheezes, skin warm and dry, speech and dress appropriate for noted age, affect euthymic. Extremity:  Left Lower Extremity  Skin clean dry and intact, without signs of infection   Incision well healed  no edema noted  Compartments supple throughout thigh and leg  Calf supple and not tender  negative Homans  Demonstrates active motion with left hip flexion, left knee flexion/extension  Presents today in a wheelchair. States sensation intact to touch in sural, deep peroneal, superficial peroneal, saphenous, posterior tibial  nerve distributions to foot/ankle. Palpable dorsalis pedis and posterior tibialis pulses, cap refill brisk in toes, foot warm/perfused.   Patient has dementia but does follow some commands on exam.    Temp 96.9 °F (36.1 °C) (Oral)     XR:   AP pelvis and left hip films demonstrate a left hip hemiarthroplasty with the hardware in

## 2021-08-23 NOTE — PATIENT INSTRUCTIONS
INSTRUCTIONS FOR FACILITY  Weight Bearing: WBAT  Therapy: Continue PT/OT  Pain control: per facility physician  Ice and elevation as needed to L hip. DVT Prophylaxis: per facility physician  Follow up: as needed    Future Appointments   Date Time Provider Rashawn Khani   9/2/2021  8:20 AM Constantine Mccain MD [address] [facility] [address]       Call with any questions or concerns.    [phone]

## 2022-07-22 ENCOUNTER — HOSPITAL ENCOUNTER (EMERGENCY)
Age: 87
Discharge: HOME | End: 2022-07-22
Attending: EMERGENCY MEDICINE
Payer: MEDICARE

## 2022-07-22 VITALS
SYSTOLIC BLOOD PRESSURE: 134 MMHG | DIASTOLIC BLOOD PRESSURE: 97 MMHG | RESPIRATION RATE: 18 BRPM | BODY MASS INDEX: 21.93 KG/M2 | WEIGHT: 140 LBS | TEMPERATURE: 97.6 F | OXYGEN SATURATION: 96 % | HEART RATE: 89 BPM

## 2022-07-22 DIAGNOSIS — H61.20 CERUMEN IN AUDITORY CANAL ON EXAMINATION: ICD-10-CM

## 2022-07-22 DIAGNOSIS — R51.9 ACUTE NONINTRACTABLE HEADACHE, UNSPECIFIED HEADACHE TYPE: Primary | ICD-10-CM

## 2022-07-22 PROCEDURE — 99283 EMERGENCY DEPT VISIT LOW MDM: CPT

## 2022-07-22 PROCEDURE — 6370000000 HC RX 637 (ALT 250 FOR IP): Performed by: EMERGENCY MEDICINE

## 2022-07-22 RX ORDER — HYDROCODONE BITARTRATE AND ACETAMINOPHEN 5; 325 MG/1; MG/1
1 TABLET ORAL ONCE
Status: COMPLETED | OUTPATIENT
Start: 2022-07-22 | End: 2022-07-22

## 2022-07-22 RX ADMIN — HYDROCODONE BITARTRATE AND ACETAMINOPHEN 1 TABLET: 5; 325 TABLET ORAL at 18:25

## 2022-07-22 NOTE — ED PROVIDER NOTES
HPI:  7/22/22,   Time: 6:12 PM EDT       Rogelio Farr is a 80 y.o. female presenting to the ED for ha, beginning unk ago. The complaint has been persistent, mild in severity, and worsened by nothing. Bib ems, from nh, hx stroke and dementia, nml orientation to person only. Pt dnr cc. No focal motor or sensory deficits. No change from baseline neuro. No falls    Review of Systems:   Pertinent positives and negatives are stated within HPI, all other systems reviewed and are negative.          --------------------------------------------- PAST HISTORY ---------------------------------------------  Past Medical History:  has a past medical history of Cerebral artery occlusion with cerebral infarction ([facility] [address] Hx of blood clots, Hyperlipidemia, and Hypertension. Past Surgical History:  has a past surgical history that includes Appendectomy; Hysterectomy; bladder repair; Cataract removal; eye surgery; joint replacement; and hip surgery (Left, 7/9/2021). Social History:  reports that she has never smoked. She has never used smokeless tobacco. She reports that she does not drink alcohol and does not use drugs. Family History: family history is not on file. The patients home medications have been reviewed. Allergies: Lisinopril, Statins, and Ciprofloxacin        ---------------------------------------------------PHYSICAL EXAM--------------------------------------    Constitutional/General: Alert and oriented x1, well appearing, non toxic in NAD  Head: Normocephalic and atraumatic, cerumen ming ear cannals  Eyes: PERRL, EOMI, conjunctive normal, sclera non icteric  Mouth: Oropharynx clear, handling secretions, no trismus, no asymmetry of the posterior oropharynx or uvular edema  Neck: Supple, full ROM,   Respiratory:  Not in respiratory distress  Cardiovascular:  2+ distal pulses  Chest: No chest wall tenderness  GI:  Abdomen Soft, Non tender, Non distended.  +   Musculoskeletal: Moves all extremities x 4. Warm and well perfused,   Integument: skin warm and dry. No rashes. Lymphatic: no lymphadenopathy noted  Neurologic: GCS 14 no focal deficits,   Psychiatric: colorful Affect    -------------------------------------------------- RESULTS -------------------------------------------------  I have personally reviewed all laboratory and imaging results for this patient. Results are listed below. LABS:  No results found for this visit on 07/22/22. RADIOLOGY:  Interpreted by Radiologist.  No orders to display       EKG: This EKG is signed and interpreted by the EP. Time:   Rate:   Rhythm:   Interpretation:   Comparison:       ------------------------- NURSING NOTES AND VITALS REVIEWED ---------------------------   The nursing notes within the ED encounter and vital signs as below have been reviewed by myself. BP (!) 134/97   Pulse 89   Temp 97.6 °F (36.4 °C)   Resp 18   Wt 140 lb (63.5 kg)   SpO2 96%   BMI 21.93 kg/m²   Oxygen Saturation Interpretation: Normal    The patients available past medical records and past encounters were reviewed. ------------------------------ ED COURSE/MEDICAL DECISION MAKING----------------------  Medications   HYDROcodone-acetaminophen (NORCO) 5-325 MG per tablet 1 tablet (1 tablet Oral Given 7/22/22 1825)         ED COURSE:       Medical Decision Making:    Dnr cc, no focal neuro deficits that are acute, pt at baseline, pain controlled, no indication for imaging due to neuro exam and code status, does have cerumen on ear, daughter concerned, dc on colace drops      This patient's ED course included: a personal history and physicial examination    This patient has remained hemodynamically stable during their ED course. Re-Evaluations:             Re-evaluation.   Patients symptoms are improving    Re-examination  7/22/22   730PM EDT          Vital Signs:   Vitals:    07/22/22 1737   BP: (!) 134/97   Pulse: 89   Resp: 18   Temp: 97.6 °F (36.4 °C)   SpO2: 96% Weight: 140 lb (63.5 kg)             Counseling: The emergency provider has spoken with the family member patient and daughter and discussed todays results, in addition to providing specific details for the plan of care and counseling regarding the diagnosis and prognosis. Questions are answered at this time and they are agreeable with the plan.       --------------------------------- IMPRESSION AND DISPOSITION ---------------------------------    IMPRESSION  1. Acute nonintractable headache, unspecified headache type    2. Cerumen in auditory canal on examination        DISPOSITION  Disposition: Discharge to nursing home  Patient condition is stable    NOTE: This report was transcribed using voice recognition software.  Every effort was made to ensure accuracy; however, inadvertent computerized transcription errors may be present        Corie Nunez MD  07/22/22 2024

## 2022-07-23 NOTE — ED NOTES
Report given to facility. Pt released with daughter for return to SNF.       Anaid Birmingham RN  07/22/22 0141

## 2022-07-23 NOTE — ED NOTES
I accessed pt's chart per a phone call from a nurse from [facility]. I spoke with the nurse Chele De La Paz LPN who wanted to know if the pt had imaging or lab work done on the pt when she was here yesterday.   Spoke on a recorded line and informed the nurse that imagining was not done due to the pt code status and pt being at baseline, and negative neuro exam.       Fox Javier RN  07/23/22 4517

## 2023-01-04 ENCOUNTER — HOSPITAL ENCOUNTER (EMERGENCY)
Age: 88
Discharge: HOME | End: 2023-01-04
Attending: EMERGENCY MEDICINE
Payer: MEDICARE

## 2023-01-04 ENCOUNTER — APPOINTMENT (OUTPATIENT)
Dept: CT IMAGING | Age: 88
End: 2023-01-04
Payer: MEDICARE

## 2023-01-04 VITALS
DIASTOLIC BLOOD PRESSURE: 95 MMHG | TEMPERATURE: 98 F | RESPIRATION RATE: 18 BRPM | HEART RATE: 112 BPM | OXYGEN SATURATION: 94 % | HEIGHT: 67 IN | BODY MASS INDEX: 21.97 KG/M2 | SYSTOLIC BLOOD PRESSURE: 167 MMHG | WEIGHT: 140 LBS

## 2023-01-04 DIAGNOSIS — E86.0 DEHYDRATION: ICD-10-CM

## 2023-01-04 DIAGNOSIS — G45.9 TIA (TRANSIENT ISCHEMIC ATTACK): Primary | ICD-10-CM

## 2023-01-04 DIAGNOSIS — R41.0 DELIRIUM: ICD-10-CM

## 2023-01-04 LAB
ALBUMIN SERPL-MCNC: 3.9 G/DL (ref 3.5–5.2)
ALP BLD-CCNC: 142 U/L (ref 35–104)
ALT SERPL-CCNC: 8 U/L (ref 0–32)
AMMONIA: 17 UMOL/L (ref 11–51)
ANION GAP SERPL CALCULATED.3IONS-SCNC: 15 MMOL/L (ref 7–16)
AST SERPL-CCNC: 18 U/L (ref 0–31)
BACTERIA: ABNORMAL /HPF
BASOPHILS ABSOLUTE: 0.02 E9/L (ref 0–0.2)
BASOPHILS RELATIVE PERCENT: 0.3 % (ref 0–2)
BILIRUB SERPL-MCNC: 0.5 MG/DL (ref 0–1.2)
BILIRUBIN URINE: NEGATIVE
BLOOD, URINE: NEGATIVE
BUN BLDV-MCNC: 15 MG/DL (ref 6–23)
CALCIUM SERPL-MCNC: 11.1 MG/DL (ref 8.6–10.2)
CHLORIDE BLD-SCNC: 105 MMOL/L (ref 98–107)
CLARITY: CLEAR
CO2: 21 MMOL/L (ref 22–29)
COLOR: YELLOW
CREAT SERPL-MCNC: 0.7 MG/DL (ref 0.5–1)
EOSINOPHILS ABSOLUTE: 0.16 E9/L (ref 0.05–0.5)
EOSINOPHILS RELATIVE PERCENT: 2.4 % (ref 0–6)
GFR SERPL CREATININE-BSD FRML MDRD: >60 ML/MIN/1.73
GLUCOSE BLD-MCNC: 90 MG/DL (ref 74–99)
GLUCOSE URINE: NEGATIVE MG/DL
HCT VFR BLD CALC: 50.1 % (ref 34–48)
HEMOGLOBIN: 16.5 G/DL (ref 11.5–15.5)
IMMATURE GRANULOCYTES #: 0.02 E9/L
IMMATURE GRANULOCYTES %: 0.3 % (ref 0–5)
KETONES, URINE: NEGATIVE MG/DL
LACTIC ACID: 1.5 MMOL/L (ref 0.5–2.2)
LEUKOCYTE ESTERASE, URINE: NEGATIVE
LYMPHOCYTES ABSOLUTE: 1.89 E9/L (ref 1.5–4)
LYMPHOCYTES RELATIVE PERCENT: 28.9 % (ref 20–42)
MCH RBC QN AUTO: 27.5 PG (ref 26–35)
MCHC RBC AUTO-ENTMCNC: 32.9 % (ref 32–34.5)
MCV RBC AUTO: 83.4 FL (ref 80–99.9)
MONOCYTES ABSOLUTE: 0.66 E9/L (ref 0.1–0.95)
MONOCYTES RELATIVE PERCENT: 10.1 % (ref 2–12)
NEUTROPHILS ABSOLUTE: 3.79 E9/L (ref 1.8–7.3)
NEUTROPHILS RELATIVE PERCENT: 58 % (ref 43–80)
NITRITE, URINE: NEGATIVE
PDW BLD-RTO: 14.2 FL (ref 11.5–15)
PH UA: 6 (ref 5–9)
PLATELET # BLD: 222 E9/L (ref 130–450)
PMV BLD AUTO: 10.3 FL (ref 7–12)
POTASSIUM SERPL-SCNC: 4 MMOL/L (ref 3.5–5)
PROTEIN UA: NEGATIVE MG/DL
RBC # BLD: 6.01 E12/L (ref 3.5–5.5)
RBC UA: ABNORMAL /HPF (ref 0–2)
SODIUM BLD-SCNC: 141 MMOL/L (ref 132–146)
SPECIFIC GRAVITY UA: 1.01 (ref 1–1.03)
TOTAL PROTEIN: 7.3 G/DL (ref 6.4–8.3)
TSH SERPL DL<=0.05 MIU/L-ACNC: 0.01 UIU/ML (ref 0.27–4.2)
UROBILINOGEN, URINE: 1 E.U./DL
WBC # BLD: 6.5 E9/L (ref 4.5–11.5)
WBC UA: ABNORMAL /HPF (ref 0–5)

## 2023-01-04 PROCEDURE — 87088 URINE BACTERIA CULTURE: CPT

## 2023-01-04 PROCEDURE — 87186 SC STD MICRODIL/AGAR DIL: CPT

## 2023-01-04 PROCEDURE — 85025 COMPLETE CBC W/AUTO DIFF WBC: CPT

## 2023-01-04 PROCEDURE — 84443 ASSAY THYROID STIM HORMONE: CPT

## 2023-01-04 PROCEDURE — 99284 EMERGENCY DEPT VISIT MOD MDM: CPT

## 2023-01-04 PROCEDURE — 83605 ASSAY OF LACTIC ACID: CPT

## 2023-01-04 PROCEDURE — 93005 ELECTROCARDIOGRAM TRACING: CPT | Performed by: EMERGENCY MEDICINE

## 2023-01-04 PROCEDURE — 81001 URINALYSIS AUTO W/SCOPE: CPT

## 2023-01-04 PROCEDURE — 82140 ASSAY OF AMMONIA: CPT

## 2023-01-04 PROCEDURE — 36415 COLL VENOUS BLD VENIPUNCTURE: CPT

## 2023-01-04 PROCEDURE — 70450 CT HEAD/BRAIN W/O DYE: CPT

## 2023-01-04 PROCEDURE — 80053 COMPREHEN METABOLIC PANEL: CPT

## 2023-01-04 RX ORDER — 0.9 % SODIUM CHLORIDE 0.9 %
500 INTRAVENOUS SOLUTION INTRAVENOUS ONCE
Status: DISCONTINUED | OUTPATIENT
Start: 2023-01-04 | End: 2023-01-04 | Stop reason: HOSPADM

## 2023-01-04 ASSESSMENT — PAIN - FUNCTIONAL ASSESSMENT: PAIN_FUNCTIONAL_ASSESSMENT: NONE - DENIES PAIN

## 2023-01-04 NOTE — ED PROVIDER NOTES
Panel Management Review      Patient has the following on her problem list: None      Composite cancer screening  Chart review shows that this patient is due/due soon for the following None  Summary:    Patient is due/failing the following:   PHQ9 and GAD7    Action needed:   none    Type of outreach:    Phone, spoke to patient.  today    PHQ-9 SCORE 5/19/2019 5/26/2019 8/22/2019   PHQ-9 Total Score MyChart 10 (Moderate depression) 9 (Mild depression) -   PHQ-9 Total Score 10 9 1     CHASE-7 SCORE 4/17/2019 5/26/2019 8/22/2019   Total Score 5 (mild anxiety) 4 (minimal anxiety) -   Total Score 5 4 0             Questions for provider review:    None                                                                                                                                    Kaylah Epstein MA       Chart routed to Provider .           HPI:  1/4/23,   Time: 11:58 AM HUMBLE Brown is a 80 y.o. female presenting to the ED for recent agitation and confusion after a fall, beginning 2 days ago. The complaint has been persistent, moderate in severity, and worsened by nothing. History was obtained from the nurse at the [facility] due to the patient's inability to give any history. Patient apparently had an unwitnessed fall 2 days ago and then started to become progressively more confused yesterday. Patient has had garbled speech and has been significantly more restless than is her baseline    ROS:   Pertinent positives and negatives are stated within HPI, all other systems reviewed and are negative.  --------------------------------------------- PAST HISTORY ---------------------------------------------  Past Medical History:  has a past medical history of Cerebral artery occlusion with cerebral infarction ([facility] [address] Hx of blood clots, Hyperlipidemia, and Hypertension. Past Surgical History:  has a past surgical history that includes Appendectomy; Hysterectomy; bladder repair; Cataract removal; eye surgery; joint replacement; and hip surgery (Left, 7/9/2021). Social History:  reports that she has never smoked. She has never used smokeless tobacco. She reports that she does not drink alcohol and does not use drugs. Family History: family history is not on file. The patients home medications have been reviewed.     Allergies: Lisinopril, Statins, and Ciprofloxacin    -------------------------------------------------- RESULTS -------------------------------------------------  All laboratory and radiology results have been personally reviewed by myself   LABS:  Results for orders placed or performed during the hospital encounter of 01/04/23   Urinalysis with Microscopic   Result Value Ref Range    Color, UA Yellow Straw/Yellow    Clarity, UA Clear Clear    Glucose, Ur Negative Negative mg/dL    Bilirubin Urine Negative Negative    Ketones, Urine Negative Negative mg/dL    Specific Gravity, UA 1.010 1.005 - 1.030    Blood, Urine Negative Negative    pH, UA 6.0 5.0 - 9.0    Protein, UA Negative Negative mg/dL    Urobilinogen, Urine 1.0 <2.0 E.U./dL    Nitrite, Urine Negative Negative    Leukocyte Esterase, Urine Negative Negative    WBC, UA 0-1 0 - 5 /HPF    RBC, UA 1-3 0 - 2 /HPF    Bacteria, UA RARE (A) None Seen /HPF   CBC with Auto Differential   Result Value Ref Range    WBC 6.5 4.5 - 11.5 E9/L    RBC 6.01 (H) 3.50 - 5.50 E12/L    Hemoglobin 16.5 (H) 11.5 - 15.5 g/dL    Hematocrit 50.1 (H) 34.0 - 48.0 %    MCV 83.4 80.0 - 99.9 fL    MCH 27.5 26.0 - 35.0 pg    MCHC 32.9 32.0 - 34.5 %    RDW 14.2 11.5 - 15.0 fL    Platelets 749 749 - 982 E9/L    MPV 10.3 7.0 - 12.0 fL    Neutrophils % 58.0 43.0 - 80.0 %    Immature Granulocytes % 0.3 0.0 - 5.0 %    Lymphocytes % 28.9 20.0 - 42.0 %    Monocytes % 10.1 2.0 - 12.0 %    Eosinophils % 2.4 0.0 - 6.0 %    Basophils % 0.3 0.0 - 2.0 %    Neutrophils Absolute 3.79 1.80 - 7.30 E9/L    Immature Granulocytes # 0.02 E9/L    Lymphocytes Absolute 1.89 1.50 - 4.00 E9/L    Monocytes Absolute 0.66 0.10 - 0.95 E9/L    Eosinophils Absolute 0.16 0.05 - 0.50 E9/L    Basophils Absolute 0.02 0.00 - 0.20 E9/L   Ammonia   Result Value Ref Range    Ammonia 17.0 11.0 - 51.0 umol/L   Lactic Acid   Result Value Ref Range    Lactic Acid 1.5 0.5 - 2.2 mmol/L       RADIOLOGY:  Interpreted by Radiologist.  CT Head W/O Contrast   Final Result   Chronic appearing supratentorial and infratentorial insults with no evidence   of acute intracranial abnormality. MRI could be utilized to further evaluate   for acute ischemia. EKG: This EKG is signed and interpreted by me.     Rate: 100  Rhythm: Sinus  Interpretation: left ventricular hypertrophy and old anterior myocardial infarction with nonspecific ST-T wave  Comparison: changes compared to previous EKG and actual improvement in the ST changes as noted    ------------------------- NURSING NOTES AND VITALS REVIEWED ---------------------------   The nursing notes within the ED encounter and vital signs as below have been reviewed. BP (!) 117/46   Pulse 92   Temp 98 °F (36.7 °C)   Resp 16   Ht 5' 7\" (1.702 m)   Wt 140 lb (63.5 kg)   SpO2 96%   BMI 21.93 kg/m²   Oxygen Saturation Interpretation: Normal      ---------------------------------------------------PHYSICAL EXAM--------------------------------------      Constitutional/General: Alert and oriented x1 well appearing, non toxic confused and agitated  Head: NC/AT  Eyes: PERRL, EOMI  Mouth: Oropharynx clear, handling secretions, no trismus  Neck: Supple, full ROM, no meningeal signs  Pulmonary: Lungs clear to auscultation bilaterally, no wheezes, rales, or rhonchi. Not in respiratory distress  Cardiovascular:  Regular rate and rhythm, no murmurs, gallops, or rubs. 2+ distal pulses  Abdomen: Soft, non tender, non distended,   Extremities: Moves all extremities x 4. Warm and well perfused  Skin: warm and dry without rash  Neurologic: GCS 14, moves all 4 extremities she is able to provide minimal history in the form of yes no answers to questions  Psych:  Anxious Affect      ------------------------------ ED COURSE/MEDICAL DECISION MAKING----------------------  Medications   0.9 % sodium chloride bolus (has no administration in time range)     Patient had an episode while here in the emergency department where she acutely became aphasic dysarthric and had a right facial droop. This was transient and resolved within 30 minutes    Medical Decision Making:    A review of the patient's records from the extended care facility as well as her most recent progress note by TEE Calderon from 8/23/2021 was performed. Additional history was obtained from the nurse at the extended care facility as well as the patient's daughter.   Review of the patient's CT scan imaging as well as the radiologist interpretation and the patient's urine analysis performed. The patient was discussed with her daughter with regards to ongoing management of her altered mental status in the setting of being a DNR CC patient. Diagnoses considered and either evaluated or ruled out with testing include MI urinary tract infection traumatic intracranial hemorrhage CVA brain tumor hyperammonemia electrolyte disturbance and uremia. Patient became more agitated while here in the emergency department. Patient's daughter did not wish to wait until the CMP resulted as dialysis is not an issue for her and she will plan to give her large amounts of oral fluids and at the assisted living facility. Patient was then discharged into the care of her daughter    Counseling: The emergency provider has spoken with the patient and family member patient and daughter and discussed todays results, in addition to providing specific details for the plan of care and counseling regarding the diagnosis and prognosis. Questions are answered at this time and they are agreeable with the plan.      --------------------------------- IMPRESSION AND DISPOSITION ---------------------------------    IMPRESSION  1. TIA (transient ischemic attack)    2. Delirium    3.  Dehydration        DISPOSITION  Disposition: Discharge to nursing home  Patient condition is stable                  Silvestre Villanueva MD  01/04/23 9315

## 2023-01-04 NOTE — ED NOTES
Lab called regarding pending labs. Awaiting results. Will follow up.       Tayler Lucas RN  01/04/23 [id number]

## 2023-01-05 LAB
EKG ATRIAL RATE: 100 BPM
EKG P AXIS: 53 DEGREES
EKG P-R INTERVAL: 154 MS
EKG Q-T INTERVAL: 376 MS
EKG QRS DURATION: 80 MS
EKG QTC CALCULATION (BAZETT): 485 MS
EKG R AXIS: -26 DEGREES
EKG T AXIS: 2 DEGREES
EKG VENTRICULAR RATE: 100 BPM

## 2023-01-05 PROCEDURE — 93010 ELECTROCARDIOGRAM REPORT: CPT | Performed by: INTERNAL MEDICINE

## 2023-01-06 LAB
ORGANISM: ABNORMAL
URINE CULTURE, ROUTINE: ABNORMAL

## 2023-01-08 NOTE — ED NOTES
Reviewed patients after hours culture results. Urine culture growing E. Coli >100k, patient discharged without antibiotic therapy. Discussed with Dr. Abhi Pate, culture results sent to [facility] on 1/8/23 for evaluation by facility provider. FAX: [phone]    No need for further intervention at this time.     Lorrine Galeazzi, PharmD, BCCCP 1/8/2023 6:00 PM  Clinical Pharmacy Specialist, Emergency Medicine  [phone]

## 2023-08-29 NOTE — TELEPHONE ENCOUNTER
Received vm from Corbin Keith at [facility], she would like to reschedule appt for pt. Pt was in precautionary isolation. Pt had [facility] mobile xrays completed. Please review xrays and advise for scheduling. Complex Repair And Dermal Autograft Text: The defect edges were debeveled with a #15 scalpel blade.  The primary defect was closed partially with a complex linear closure.  Given the location of the defect, shape of the defect and the proximity to free margins an dermal autograft was deemed most appropriate to repair the remaining defect.  The graft was trimmed to fit the size of the remaining defect.  The graft was then placed in the primary defect, oriented appropriately, and sutured into place.

## (undated) DEVICE — GOWN,BREATHABLE SLV,AURORA,XLG,STRL: Brand: MEDLINE

## (undated) DEVICE — STRYKER PERFORMANCE SERIES SAGITTAL BLADE: Brand: STRYKER PERFORMANCE SERIES

## (undated) DEVICE — DRILL SYSTEM 7

## (undated) DEVICE — SYRINGE 20ML LL S/C 50

## (undated) DEVICE — SET ORTHO CENTRAX CUPS

## (undated) DEVICE — SURGICAL PROCEDURE PACK BASIC

## (undated) DEVICE — GLOVE ORANGE PI 8   MSG9080

## (undated) DEVICE — PILLOW POS W15XH6XL22IN RASPBERRY FOAM ABD W/ STRP DISP FOR

## (undated) DEVICE — TOWEL,OR,DSP,ST,BLUE,DLX,10/PK,8PK/CS: Brand: MEDLINE

## (undated) DEVICE — TOTAL HIP PK

## (undated) DEVICE — BLADE CLIPPER GEN PURP NS

## (undated) DEVICE — SET ORTHO STD STORTSTD1

## (undated) DEVICE — 3M™ IOBAN™ 2 ANTIMICROBIAL INCISE DRAPE 6650EZ: Brand: IOBAN™ 2

## (undated) DEVICE — GLOVE ORTHO 8   MSG9480

## (undated) DEVICE — PATIENT RETURN ELECTRODE, SINGLE-USE, CONTACT QUALITY MONITORING, ADULT, WITH 9FT CORD, FOR PATIENTS WEIGING OVER 33LBS. (15KG): Brand: MEGADYNE

## (undated) DEVICE — DRIP REDUCTION MANIFOLD

## (undated) DEVICE — SHEET,DRAPE,53X77,STERILE: Brand: MEDLINE

## (undated) DEVICE — SOLUTION IRRIG 3000ML 0.9% SOD CHL USP UROMATIC PLAS CONT

## (undated) DEVICE — BIT DRL L5IN DIA2.8MM STD ST S STL TWST BUSA

## (undated) DEVICE — E-Z CLEAN, NON-STICK, PTFE COATED, ELECTROSURGICAL BLADE ELECTRODE, 6.5 INCH (16.5 CM): Brand: MEGADYNE

## (undated) DEVICE — HANDPIECE SET WITH BONE CLEANING TIP AND SUCTION TUBE: Brand: INTERPULSE

## (undated) DEVICE — SET ORTHO STD STORTSTD2

## (undated) DEVICE — SET ORTHO ACCOLADE HEMI HIP BROACH

## (undated) DEVICE — 3M™ STERI-DRAPE™ U-DRAPE 1015: Brand: STERI-DRAPE™

## (undated) DEVICE — GOWN,AURORA,BRTHSLV,2XL,18/CS: Brand: MEDLINE

## (undated) DEVICE — Device

## (undated) DEVICE — CLOTH SURG PREP PREOPERATIVE CHLORHEXIDINE GLUC 2% READYPREP

## (undated) DEVICE — SYRINGE MED 50ML LUERLOCK TIP

## (undated) DEVICE — 3M™ COBAN™ NL STERILE NON-LATEX SELF-ADHERENT WRAP, 2084S, 4 IN X 5 YD (10 CM X 4,5 M), 18 ROLLS/CASE: Brand: 3M™ COBAN™

## (undated) DEVICE — DRESSING,GAUZE,XEROFORM,CURAD,5"X9",ST: Brand: CURAD

## (undated) DEVICE — NEEDLE HYPO 18GA L1.5IN PNK POLYPR HUB S STL REG BVL STR

## (undated) DEVICE — INTENDED FOR TISSUE SEPARATION, AND OTHER PROCEDURES THAT REQUIRE A SHARP SURGICAL BLADE TO PUNCTURE OR CUT.: Brand: BARD-PARKER ® STAINLESS STEEL BLADES

## (undated) DEVICE — 1000 S-DRAPE TOWEL DRAPE 10/BX: Brand: STERI-DRAPE™

## (undated) DEVICE — NEEDLE HYPO 21GA L1.5IN GRN POLYPR HUB S STL REG BVL STR

## (undated) DEVICE — SET ORTHO ACCOLADE HEMI HIP INSRT

## (undated) DEVICE — APPLICATOR PREP 26ML 0.7% IOD POVACRYLEX 74% ISO ALC ST